# Patient Record
Sex: MALE | Race: WHITE | Employment: OTHER | URBAN - METROPOLITAN AREA
[De-identification: names, ages, dates, MRNs, and addresses within clinical notes are randomized per-mention and may not be internally consistent; named-entity substitution may affect disease eponyms.]

---

## 2017-02-03 ENCOUNTER — OFFICE VISIT (OUTPATIENT)
Dept: HEMATOLOGY | Age: 57
End: 2017-02-03

## 2017-02-03 VITALS
HEIGHT: 71 IN | WEIGHT: 221.2 LBS | BODY MASS INDEX: 30.97 KG/M2 | OXYGEN SATURATION: 97 % | HEART RATE: 92 BPM | TEMPERATURE: 97.8 F | SYSTOLIC BLOOD PRESSURE: 141 MMHG | DIASTOLIC BLOOD PRESSURE: 87 MMHG

## 2017-02-03 DIAGNOSIS — E83.01 WILSON'S DISEASE: Primary | ICD-10-CM

## 2017-02-03 PROBLEM — D86.9 SARCOIDOSIS: Status: ACTIVE | Noted: 2017-02-03

## 2017-02-03 PROBLEM — G51.0 BELL'S PALSY: Status: ACTIVE | Noted: 2017-02-03

## 2017-02-03 RX ORDER — OMEPRAZOLE 40 MG/1
CAPSULE, DELAYED RELEASE ORAL
Refills: 3 | COMMUNITY
Start: 2016-12-17

## 2017-02-03 RX ORDER — BISMUTH SUBSALICYLATE 262 MG
1 TABLET,CHEWABLE ORAL DAILY
COMMUNITY
End: 2017-04-26

## 2017-02-03 RX ORDER — ZINC ACETATE 50 MG/1
50 CAPSULE ORAL 3 TIMES DAILY
COMMUNITY
Start: 2016-12-13 | End: 2021-03-02 | Stop reason: RX

## 2017-02-03 NOTE — MR AVS SNAPSHOT
Visit Information Date & Time Provider Department Dept. Phone Encounter #  
 2/3/2017  4:00 PM Prudencio Orozco MD [facility] [address] [place] [address] [id number] Follow-up Instructions Return in about 2 weeks (around 2/17/2017) for FS with MLS. Your Appointments 2/3/2017  4:00 PM  
New Patient with Prudencio Orozco MD  
[facility] ([address]) Appt Note: Rashawn's Disease [facility] [phone] [facility] [id number]  
[address] [facility] [place] [address] Upcoming Health Maintenance Date Due Hepatitis C Screening 1960 DTaP/Tdap/Td series (1 - Tdap) 8/24/1982 FOBT Q 1 YEAR AGE 50-75 8/24/2011 INFLUENZA AGE 9 TO ADULT 8/1/2016 Allergies as of 2/3/2017  Review Complete On: 2/3/2017 By: Radha Linda LPN No Known Allergies Current Immunizations  Never Reviewed Name Date Hep A and Hep B Vaccine 12/12/2016, 11/7/2016 Influenza Vaccine 10/12/2016 Pneumococcal Conjugate (PCV-13) 12/24/2015 Td 11/23/2015 Not reviewed this visit You Were Diagnosed With   
  
 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 
ICD-9-CM: 275.1 Vitals BP Pulse Temp Height(growth percentile) Weight(growth percentile) SpO2  
 141/87 (BP 1 Location: Right arm, BP Patient Position: Sitting) 92 97.8 °F (36.6 °C) (Oral) 5' 11\" (1.803 m) 221 lb 3.2 oz (100.3 kg) 97% BMI Smoking Status 30.85 kg/m2 Never Assessed Vitals History BMI and BSA Data Body Mass Index Body Surface Area  
 30.85 kg/m 2 2.24 m 2 Preferred Pharmacy [facility] Phone [facility], [address] Your Updated Medication List  
  
   
This list is accurate as of: 2/3/17  3:42 PM.  Always use your most recent med list.  
  
  
  
  
 GALZIN 50 mg (zinc) Cap Generic drug:  zinc acetate  
  
 multivitamin tablet Commonly known as:  ONE A DAY Take 1 Tab by mouth daily. OMEGA 3 FISH OIL PO Take  by mouth. omeprazole 40 mg capsule Commonly known as:  PRILOSEC  
1 (ONE) CAPSULE DR BY MOUTH DAILY We Performed the Following ACTIN (SMOOTH MUSCLE) ANTIBODY [id number] CPT(R)] ANTI-NEUTROPHIL CYTOPLASMIC AB D0779820 CPT(R)] ANTINUCLEAR ANTIBODIES, IFA C2195290 CPT(R)] CBC WITH AUTOMATED DIFF [38040 CPT(R)] CERULOPLASMIN N7157632 CPT(R)] COPPER N558978 CPT(R)] HCV AB W/REFLEX VERIFICATION [[id number] Custom] HEP A AB, TOTAL C5839045 CPT(R)] HEP B SURFACE AB E8064160 CPT(R)] HEP B SURFACE AG D8530418 CPT(R)] HEPATIC FUNCTION PANEL [42451 CPT(R)] HEPATITIS B CORE AB, TOTAL H0462092 CPT(R)] METABOLIC PANEL, BASIC [59784 CPT(R)] MITOCHONDRIAL M2 AB E8390098 CPT(R)] ROHIT DISEASE SCREEN [[id number] Custom] Follow-up Instructions Return in about 2 weeks (around 2/17/2017) for FS with MLS. To-Do List   
 02/16/2017 8:30 AM  
  Appointment with Sheila Ochoa MD at [facility] ([phone]) Introducing [facility] & HEALTH SERVICES! Anitra Gallegos introduces [employer] patient portal. Now you can access parts of your medical record, email your doctor's office, and request medication refills online. 1. In your internet browser, go to [email or web address] 2. Click on the First Time User? Click Here link in the Sign In box. You will see the New Member Sign Up page. 3. Enter your [employer] Access Code exactly as it appears below. You will not need to use this code after youve completed the sign-up process. If you do not sign up before the expiration date, you must request a new code. · [employer] Access Code: [email or web address] Expires: 5/4/2017  3:42 PM 
 
4.  Enter the last four digits of your Social Security Number (xxxx) and Date of Birth (mm/dd/yyyy) as indicated and click Submit. You will be taken to the next sign-up page. 5. Create a Mobile [employer] ID. This will be your Mobile [employer] login ID and cannot be changed, so think of one that is secure and easy to remember. 6. Create a [employer] password. You can change your password at any time. 7. Enter your Password Reset Question and Answer. This can be used at a later time if you forget your password. 8. Enter your e-mail address. You will receive e-mail notification when new information is available in [address] 9. Click Sign Up. You can now view and download portions of your medical record. 10. Click the Download Summary menu link to download a portable copy of your medical information. If you have questions, please visit the Frequently Asked Questions section of the [employer] website. Remember, [employer] is NOT to be used for urgent needs. For medical emergencies, dial 911. Now available from your iPhone and Android! Please provide this summary of care documentation to your next provider. Your primary care clinician is listed as Evelio Rob. If you have any questions after today's visit, please call [phone].

## 2017-02-03 NOTE — PROGRESS NOTES
[address], MD, [facility]     April REJI Baxter PA-C Raymond Calin, MD, [place], MD Delilah Darden NP [facility], NP        [address] [facility], [address] [place]  22.     [phone]     FAX: [address], [address] 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 All medical records sent by the referring physicians were reviewed including imaging studies     The patient is a 55 y.o.  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 He was monitored at [facility]. He was initially treated with Penicillamine. He has been treated with Zinc for the past several years. In 2016 he was found to have enlarged lymph nodes on the chest XR and was found to have Sarcoidosis. He was also noted to have elevated liver enzymes. Serologic evaluation for markers of chronic liver disease were either not performed or available to me. Imaging of the liver was not performed. An assessment of liver fibrosis with biopsy or elastography has not been performed.       The most recent laboratory studies indicate that the liver transaminases are normal, ALP is elevated, tests of hepatic synthetic and metabolic function are normal, and the platelet count is normal.    The patient has no symptoms which could be attributed to the liver disorder. The patient completes all daily activities without any functional limitations. The patient has not experienced fatigue, pain in the right side over the liver,       ALLERGIES  No Known Allergies    MEDICATIONS  Current Outpatient Prescriptions   Medication Sig    omeprazole (PRILOSEC) 40 mg capsule 1 (ONE) CAPSULE DR BY MOUTH DAILY    GALZIN 50 mg (zinc) cap     multivitamin (ONE A DAY) tablet Take 1 Tab by mouth daily.  OMEGA-3 FATTY ACIDS/FISH OIL (OMEGA 3 FISH OIL PO) Take  by mouth. No current facility-administered medications for this visit. SYSTEM REVIEW NOT RELATED TO LIVER DISEASE OR REVIEWED ABOVE:  Constitution systems: Negative for fever, chills, weight gain, weight loss. Eyes: Negative for visual changes. ENT: Negative for sore throat, painful swallowing. Respiratory: Negative for cough, hemoptysis, SOB. Cardiology: Negative for chest pain, palpitations. GI:  Negative for constipation or diarrhea. : Negative for urinary frequency, dysuria, hematuria, nocturia. Skin: Negative for rash. Hematology: Negative for easy bruising, blood clots. Musculo-skelatal: Negative for back pain, muscle pain, weakness. Neurologic: Negative for headaches, dizziness, vertigo, memory problems not related to HE. Psychology: Negative for anxiety, depression. FAMILY HISTORY:  The father  of accident. The mother  of glioblastoma. A brother  of Wilsons diease. Several siblings have Wilsons disease. SOCIAL HISTORY:  The patient is . The patient has no children. The patient has never used tobacco products. The patient consumes alcohol on rare social occasions never in excess. The patient currently works full time  and cow farmer.         PHYSICAL EXAMINATION:  Visit Vitals    /87 (BP 1 Location: Right arm, BP Patient Position: Sitting)    Pulse 92    Temp 97.8 °F (36.6 °C) (Oral)    Ht 5' 11\" (1.803 m)  Wt 221 lb 3.2 oz (100.3 kg)    SpO2 97%    BMI 30.85 kg/m2     General: No acute distress. Eyes: Sclera anicteric. ENT: No oral lesions. Thyroid normal.  Nodes: No adenopathy. Skin: No spider angiomata. No jaundice. No palmar erythema. Respiratory: Lungs clear to auscultation. Cardiovascular: Regular heart rate. No murmurs. No JVD. Abdomen: Soft non-tender. Liver size normal to percussion/palpation. Spleen not palpable. No obvious ascites. Extremities: No edema. No muscle wasting. No gross arthritic changes. Neurologic: Alert and oriented. Cranial nerves grossly intact. No asterixis.     LABORATORY STUDIES:  Liver [facility] Ref Rng & Units 2/3/2017   WBC 3.4 - 10.8 x10E3/uL 10.3   ANC 1.4 - 7.0 x10E3/uL 5.8   HGB 12.6 - 17.7 g/dL 15.1    - 379 x10E3/uL 261   AST 0 - 40 IU/L 35   ALT 0 - 44 IU/L 44   Alk Phos 39 - 117 IU/L 207 (H)   Bili, Total 0.0 - 1.2 mg/dL 0.6   Bili, Direct 0.00 - 0.40 mg/dL 0.17   Albumin 3.5 - 5.5 g/dL 4.4   BUN 6 - 24 mg/dL 17   Creat 0.76 - 1.27 mg/dL 0.90   Na 134 - 144 mmol/L 139   K 3.5 - 5.2 mmol/L 4.3   Cl 96 - 106 mmol/L 100   CO2 18 - 29 mmol/L 25   Glucose 65 - 99 mg/dL 100 (H)     SEROLOGIES:  Serologies Latest Ref Rng & Units 2/3/2017   Hep A Ab, Total Negative Positive (A)   Hep B Surface Ag Negative Negative   Hep B Core Ab, Total Negative Negative   Hep B Surface AB QL  Non Reactive   Hep C Ab 0.0 - 0.9 s/co ratio <0.1   MANJINDER, IFA  Negative   C-ANCA Neg:<1:20 titer <1:20   P-ANCA Neg:<1:20 titer <1:20   ANCA Neg:<1:20 titer <1:20   ASMCA 0 - 19 Units 19   M2 Ab 0.0 - 20.0 Units 14.7   Ceruloplasmin 16.0 - 31.0 mg/dL 4.9 (L)     LIVER HISTOLOGY:  Not available or performed    ENDOSCOPIC PROCEDURES:  Not available or performed    RADIOLOGY:  Not available or performed    OTHER TESTING:  Not available or performed    ASSESSMENT AND PLAN:  Persistent elevation in alkaline phosphate which is likely related to liver involvement with sarcoidosis. Will perform laboratory testing to monitor liver function and degree of liver injury. This included BMP, hepatic panel, CBC with platelet count,     The ALP is not elevated in Wilsons diease. It is typically low. Liver function is normal.  The platelet count is normal.      Serologic testing to help define the cause of the laboratory abnormality were positive for a low ceruloplasmin consistent with the diagnosis of [place] disease     There is no reason to perform liver biopsy at this time. The need to perform an assessment of liver fibrosis was discussed with the patient. The Fibroscan can assess liver fibrosis and determine if a patient has advanced fibrosis or cirrhosis without the need for liver biopsy. The Fibroscan is currently available at [facility]. This will be performed at the next office appointment. If the Fibroscan suggests advanced fibrosis then a liver biopsy should be considered. Will check serum and urine copper. The patient was directed to continue all current medications at the current dosages. There are no contraindications for the patient to take any medications that are necessary for treatment of other medical issues. The patient was counseled regarding alcohol consumption. Vaccination for viral hepatitis A is not needed. The patient has serologic evidence of prior exposure or vaccination with immunity. All of the above issues were discussed with the patient. All questions were answered. The patient expressed a clear understanding of the above. [facility] [place] [address] in 4 weeks for Fibroscan, to review all data and determine the treatment plan.     Irma Slaas MD  [facility] [place] [facility] [address] [place] [address] [address] [place]  22.  [phone]

## 2017-02-04 LAB
ALBUMIN SERPL-MCNC: 4.4 G/DL (ref 3.5–5.5)
ALP SERPL-CCNC: 207 IU/L (ref 39–117)
ALT SERPL-CCNC: 44 IU/L (ref 0–44)
AST SERPL-CCNC: 35 IU/L (ref 0–40)
BASOPHILS # BLD AUTO: 0.1 X10E3/UL (ref 0–0.2)
BASOPHILS NFR BLD AUTO: 1 %
BILIRUB DIRECT SERPL-MCNC: 0.17 MG/DL (ref 0–0.4)
BILIRUB SERPL-MCNC: 0.6 MG/DL (ref 0–1.2)
BUN SERPL-MCNC: 17 MG/DL (ref 6–24)
BUN/CREAT SERPL: 19 (ref 9–20)
CALCIUM SERPL-MCNC: 9.3 MG/DL (ref 8.7–10.2)
CERULOPLASMIN SERPL-MCNC: 4.9 MG/DL (ref 16–31)
CHLORIDE SERPL-SCNC: 100 MMOL/L (ref 96–106)
CO2 SERPL-SCNC: 25 MMOL/L (ref 18–29)
COMMENT, 144067: NORMAL
COPPER SERPL-MCNC: 11 UG/DL (ref 72–166)
CREAT SERPL-MCNC: 0.9 MG/DL (ref 0.76–1.27)
EOSINOPHIL # BLD AUTO: 0.3 X10E3/UL (ref 0–0.4)
EOSINOPHIL NFR BLD AUTO: 2 %
ERYTHROCYTE [DISTWIDTH] IN BLOOD BY AUTOMATED COUNT: 15 % (ref 12.3–15.4)
GLUCOSE SERPL-MCNC: 100 MG/DL (ref 65–99)
HAV AB SER QL IA: POSITIVE
HBV CORE AB SERPL QL IA: NEGATIVE
HBV SURFACE AB SER QL: NON REACTIVE
HBV SURFACE AG SERPL QL IA: NEGATIVE
HCT VFR BLD AUTO: 43.8 % (ref 37.5–51)
HCV AB S/CO SERPL IA: <0.1 S/CO RATIO (ref 0–0.9)
HGB BLD-MCNC: 15.1 G/DL (ref 12.6–17.7)
IMM GRANULOCYTES # BLD: 0 X10E3/UL (ref 0–0.1)
IMM GRANULOCYTES NFR BLD: 0 %
LYMPHOCYTES # BLD AUTO: 3.1 X10E3/UL (ref 0.7–3.1)
LYMPHOCYTES NFR BLD AUTO: 30 %
MCH RBC QN AUTO: 31.2 PG (ref 26.6–33)
MCHC RBC AUTO-ENTMCNC: 34.5 G/DL (ref 31.5–35.7)
MCV RBC AUTO: 91 FL (ref 79–97)
MONOCYTES # BLD AUTO: 1.1 X10E3/UL (ref 0.1–0.9)
MONOCYTES NFR BLD AUTO: 11 %
NEUTROPHILS # BLD AUTO: 5.8 X10E3/UL (ref 1.4–7)
NEUTROPHILS NFR BLD AUTO: 56 %
PLATELET # BLD AUTO: 261 X10E3/UL (ref 150–379)
POTASSIUM SERPL-SCNC: 4.3 MMOL/L (ref 3.5–5.2)
PROT SERPL-MCNC: 8.5 G/DL (ref 6–8.5)
RBC # BLD AUTO: 4.84 X10E6/UL (ref 4.14–5.8)
SODIUM SERPL-SCNC: 139 MMOL/L (ref 134–144)
WBC # BLD AUTO: 10.3 X10E3/UL (ref 3.4–10.8)

## 2017-02-05 LAB
ACTIN IGG SERPL-ACNC: 19 UNITS (ref 0–19)
MITOCHONDRIA M2 IGG SER-ACNC: 14.7 UNITS (ref 0–20)

## 2017-02-06 LAB
ANA TITR SER IF: NEGATIVE {TITER}
C-ANCA TITR SER IF: NORMAL TITER
P-ANCA ATYPICAL TITR SER IF: NORMAL TITER
P-ANCA TITR SER IF: NORMAL TITER

## 2017-02-16 ENCOUNTER — OFFICE VISIT (OUTPATIENT)
Dept: HEMATOLOGY | Age: 57
End: 2017-02-16

## 2017-02-16 VITALS
OXYGEN SATURATION: 99 % | SYSTOLIC BLOOD PRESSURE: 119 MMHG | WEIGHT: 218.25 LBS | HEART RATE: 73 BPM | HEIGHT: 71 IN | DIASTOLIC BLOOD PRESSURE: 72 MMHG | BODY MASS INDEX: 30.56 KG/M2 | TEMPERATURE: 97.4 F

## 2017-02-16 DIAGNOSIS — E83.01 WILSON'S DISEASE: Primary | ICD-10-CM

## 2017-02-16 NOTE — MR AVS SNAPSHOT
Visit Information Date & Time Provider Department Dept. Phone Encounter #  
 2/16/2017  8:30 AM Jose R Garcia MD [facility] [address] [id number] Your Appointments 4/27/2017 11:00 AM  
PROCEDURE with Jose R Garcia MD  
[facility] Appt Note: Liver Biopsy [address] [facility] [phone] [facility] [address]  
[address] [place] [address] 5/11/2017  3:00 PM  
Follow Up with Jose R Garcia MD  
[facility] Appt Note: Follow Up  
 [address] [facility] [phone] [facility] [address]  
[address] [place] [address] Upcoming Health Maintenance Date Due FOBT Q 1 YEAR AGE 50-75 8/24/2011 DTaP/Tdap/Td series (1 - Tdap) 11/24/2015 Allergies as of 2/16/2017  Review Complete On: 2/16/2017 By: Carlee Espinal LPN No Known Allergies Current Immunizations  Never Reviewed Name Date Hep A and Hep B Vaccine 12/12/2016, 11/7/2016 Influenza Vaccine 10/12/2016 Pneumococcal Conjugate (PCV-13) 12/24/2015 Td 11/23/2015 Not reviewed this visit You Were Diagnosed With   
  
 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 
ICD-9-CM: 275.1 Vitals BP Pulse Temp Height(growth percentile) Weight(growth percentile) SpO2  
 119/72 73 97.4 °F (36.3 °C) (Tympanic) 5' 11\" (1.803 m) 218 lb 4 oz (99 kg) 99% BMI Smoking Status 30.44 kg/m2 Never Smoker BMI and BSA Data Body Mass Index Body Surface Area  
 30.44 kg/m 2 2.23 m 2 Preferred Pharmacy [facility] Name Phone [facility] [address], [address] Your Updated Medication List  
  
   
This list is accurate as of: 2/16/17  9:01 AM.  Always use your most recent med list.  
  
  
  
  
 GALZIN 50 mg (zinc) Cap Generic drug:  zinc acetate  
  
 multivitamin tablet Commonly known as:  ONE A DAY Take 1 Tab by mouth daily. OMEGA 3 FISH OIL PO Take  by mouth. omeprazole 40 mg capsule Commonly known as:  PRILOSEC  
1 (ONE) CAPSULE DR BY MOUTH DAILY We Performed the Following CBC WITH AUTOMATED DIFF [59348 CPT(R)] HEPATIC FUNCTION PANEL [47657 CPT(R)] PROTHROMBIN TIME + INR [98234 CPT(R)] To-Do List   
 03/18/2017 Procedures:  BIOPSY LIVER Introducing [facility] & HEALTH SERVICES! Keerthi Randall introduces [employer] patient portal. Now you can access parts of your medical record, email your doctor's office, and request medication refills online. 1. In your internet browser, go to [email or web address] 2. Click on the First Time User? Click Here link in the Sign In box. You will see the New Member Sign Up page. 3. Enter your [employer] Access Code exactly as it appears below. You will not need to use this code after youve completed the sign-up process. If you do not sign up before the expiration date, you must request a new code. · [employer] Access Code: [email or web address] Expires: 5/4/2017  3:42 PM 
 
4. Enter the last four digits of your Social Security Number (xxxx) and Date of Birth (mm/dd/yyyy) as indicated and click Submit. You will be taken to the next sign-up page. 5. Create a [employer] ID. This will be your [employer] login ID and cannot be changed, so think of one that is secure and easy to remember. 6. Create a [employer] password. You can change your password at any time. 7. Enter your Password Reset Question and Answer. This can be used at a later time if you forget your password. 8. Enter your e-mail address. You will receive e-mail notification when new information is available in [address] 9. Click Sign Up. You can now view and download portions of your medical record. 10. Click the Download Summary menu link to download a portable copy of your medical information. If you have questions, please visit the Frequently Asked Questions section of the [employer] website. Remember, [employer] is NOT to be used for urgent needs. For medical emergencies, dial 911. Now available from your iPhone and Android! Please provide this summary of care documentation to your next provider. Your primary care clinician is listed as Robert Saleh. If you have any questions after today's visit, please call [phone].

## 2017-02-16 NOTE — PROGRESS NOTES
93 Dillan Melvin MD, REJI Kelley PA-C Prentiss Callander, MD, [address], MD Delilah Alvarez NP Sam Ogren, NP        9400 [facility], [address] [facility], [address]     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 The active problem list, all pertinent past medical history, medications, radiologic findings and laboratory findings related to the liver disorder were reviewed with the patient. The patient is a 55 y.o.  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 He was monitored at [facility]. He was initially treated with Penicillamine. He has been treated with Zinc for the past several years. In 2016 he was found to have enlarged lymph nodes on the chest XR and was found to have Sarcoidosis. He was also noted to have elevated liver enzymes. Serologic evaluation for markers of chronic liver disease positive for a low ceruloplasmin. Imaging of the liver was not performed. Assessment of liver fibrosis was performed in the office today with Fibroscan. The result was 21.8 kPa which correlates with cirrhosis.     The most recent laboratory studies indicate that the liver transaminases are normal, ALP is elevated, tests of hepatic synthetic and metabolic function are normal, and the platelet count is normal.    The patient has no symptoms which could be attributed to the liver disorder. The patient completes all daily activities without any functional limitations. The patient has not experienced fatigue, pain in the right side over the liver,       ALLERGIES  No Known Allergies    MEDICATIONS  Current Outpatient Prescriptions   Medication Sig    omeprazole (PRILOSEC) 40 mg capsule 1 (ONE) CAPSULE DR BY MOUTH DAILY    GALZIN 50 mg (zinc) cap     multivitamin (ONE A DAY) tablet Take 1 Tab by mouth daily.  OMEGA-3 FATTY ACIDS/FISH OIL (OMEGA 3 FISH OIL PO) Take  by mouth. No current facility-administered medications for this visit. SYSTEM REVIEW NOT RELATED TO LIVER DISEASE OR REVIEWED ABOVE:  Constitution systems: Negative for fever, chills, weight gain, weight loss. Eyes: Negative for visual changes. ENT: Negative for sore throat, painful swallowing. Respiratory: Negative for cough, hemoptysis, SOB. Cardiology: Negative for chest pain, palpitations. GI:  Negative for constipation or diarrhea. : Negative for urinary frequency, dysuria, hematuria, nocturia. Skin: Negative for rash. Hematology: Negative for easy bruising, blood clots. Musculo-skelatal: Negative for back pain, muscle pain, weakness. Neurologic: Negative for headaches, dizziness, vertigo, memory problems not related to HE. Psychology: Negative for anxiety, depression. FAMILY HISTORY:  The father  of accident. The mother  of glioblastoma. A brother  of Wilsons diease. Several siblings have Wilsons disease. SOCIAL HISTORY:  The patient is . The patient has no children. The patient has never used tobacco products. The patient consumes alcohol on rare social occasions never in excess.     The patient currently works full time  and cow farmer. PHYSICAL EXAMINATION:  Visit Vitals    /72    Pulse 73    Temp 97.4 °F (36.3 °C) (Tympanic)    Ht 5' 11\" (1.803 m)    Wt 218 lb 4 oz (99 kg)    SpO2 99%    BMI 30.44 kg/m2     General: No acute distress. Eyes: Sclera anicteric. ENT: No oral lesions. Thyroid normal.  Nodes: No adenopathy. Skin: No spider angiomata. No jaundice. No palmar erythema. Respiratory: Lungs clear to auscultation. Cardiovascular: Regular heart rate. No murmurs. No JVD. Abdomen: Soft non-tender. Liver size normal to percussion/palpation. Spleen not palpable. No obvious ascites. Extremities: No edema. No muscle wasting. No gross arthritic changes. Neurologic: Alert and oriented. Cranial nerves grossly intact. No asterixis. LABORATORY STUDIES:  [facility] Ref Rng & Units 2/3/2017   WBC 3.4 - 10.8 x10E3/uL 10.3   ANC 1.4 - 7.0 x10E3/uL 5.8   HGB 12.6 - 17.7 g/dL 15.1    - 379 x10E3/uL 261   AST 0 - 40 IU/L 35   ALT 0 - 44 IU/L 44   Alk Phos 39 - 117 IU/L 207 (H)   Bili, Total 0.0 - 1.2 mg/dL 0.6   Bili, Direct 0.00 - 0.40 mg/dL 0.17   Albumin 3.5 - 5.5 g/dL 4.4   BUN 6 - 24 mg/dL 17   Creat 0.76 - 1.27 mg/dL 0.90   Na 134 - 144 mmol/L 139   K 3.5 - 5.2 mmol/L 4.3   Cl 96 - 106 mmol/L 100   CO2 18 - 29 mmol/L 25   Glucose 65 - 99 mg/dL 100 (H)     SEROLOGIES:  Serologies Latest Ref Rng & Units 2/3/2017   Hep A Ab, Total Negative Positive (A)   Hep B Surface Ag Negative Negative   Hep B Core Ab, Total Negative Negative   Hep B Surface AB QL  Non Reactive   Hep C Ab 0.0 - 0.9 s/co ratio <0.1   MANJINDER, IFA  Negative   C-ANCA Neg:<1:20 titer <1:20   P-ANCA Neg:<1:20 titer <1:20   ANCA Neg:<1:20 titer <1:20   ASMCA 0 - 19 Units 19   M2 Ab 0.0 - 20.0 Units 14.7   Ceruloplasmin 16.0 - 31.0 mg/dL 4.9 (L)     LIVER HISTOLOGY:  2/2017. FibroScan performed at [address]. EkPa was 21.8. IQR/med 28%. The results suggested a fibrosis level of F4.     ENDOSCOPIC PROCEDURES:  Not available or performed    RADIOLOGY:  Not available or performed    OTHER TESTING:  Not available or performed    ASSESSMENT AND PLAN:  Persistent elevation in alkaline phosphate which is likely related to liver involvement with sarcoidosis. The ALP is not elevated in Wilsons diease. It is typically low. Serum copper is low consistent with Wilsons disease. Liver function is normal.  The platelet count is normal.      Serologic testing to help define the cause of the laboratory abnormality were positive for a low ceruloplasmin consistent with the diagnosis of Rashawn disease     The Fibroscan suggests the patient has cirrhosis. This is not consistent with the clinical picture of lab studies. It is possible that Sarcoidosis has incresaed the stiffness of the liver without causing cirrhosis. It is also possible that the combiantion of hepatic Sarcoidosis and copper has causes cirrhosis. The need to perform an assessment of liver fibrosis was discussed with the patient. The Fibroscan can assess liver fibrosis and determine if a patient has advanced fibrosis or cirrhosis without the need for liver biopsy. The Fibroscan is currently available at [facility]. Will proceed with liver biopsy. The patient was directed to continue all current medications at the current dosages. There are no contraindications for the patient to take any medications that are necessary for treatment of other medical issues. The patient was counseled regarding alcohol consumption. Vaccination for viral hepatitis A is not needed. The patient has serologic evidence of prior exposure or vaccination with immunity. All of the above issues were discussed with the patient. All questions were answered. The patient expressed a clear understanding of the above. [address] in 2 weeks after liver biopsy.     Ag Ortega MD  [facility]. Arelis [address]  [address] [place]  22.  [phone]

## 2017-02-17 LAB
ALBUMIN SERPL-MCNC: 4.2 G/DL (ref 3.5–5.5)
ALP SERPL-CCNC: 178 IU/L (ref 39–117)
ALT SERPL-CCNC: 42 IU/L (ref 0–44)
AST SERPL-CCNC: 39 IU/L (ref 0–40)
BASOPHILS # BLD AUTO: 0.1 X10E3/UL (ref 0–0.2)
BASOPHILS NFR BLD AUTO: 1 %
BILIRUB DIRECT SERPL-MCNC: 0.14 MG/DL (ref 0–0.4)
BILIRUB SERPL-MCNC: 0.5 MG/DL (ref 0–1.2)
EOSINOPHIL # BLD AUTO: 0.2 X10E3/UL (ref 0–0.4)
EOSINOPHIL NFR BLD AUTO: 2 %
ERYTHROCYTE [DISTWIDTH] IN BLOOD BY AUTOMATED COUNT: 15.2 % (ref 12.3–15.4)
HCT VFR BLD AUTO: 43.6 % (ref 37.5–51)
HGB BLD-MCNC: 14.9 G/DL (ref 12.6–17.7)
IMM GRANULOCYTES # BLD: 0 X10E3/UL (ref 0–0.1)
IMM GRANULOCYTES NFR BLD: 0 %
INR PPP: 1.1 (ref 0.8–1.2)
LYMPHOCYTES # BLD AUTO: 2.6 X10E3/UL (ref 0.7–3.1)
LYMPHOCYTES NFR BLD AUTO: 34 %
MCH RBC QN AUTO: 30.5 PG (ref 26.6–33)
MCHC RBC AUTO-ENTMCNC: 34.2 G/DL (ref 31.5–35.7)
MCV RBC AUTO: 89 FL (ref 79–97)
MONOCYTES # BLD AUTO: 0.8 X10E3/UL (ref 0.1–0.9)
MONOCYTES NFR BLD AUTO: 10 %
NEUTROPHILS # BLD AUTO: 4 X10E3/UL (ref 1.4–7)
NEUTROPHILS NFR BLD AUTO: 53 %
PLATELET # BLD AUTO: 299 X10E3/UL (ref 150–379)
PROT SERPL-MCNC: 8 G/DL (ref 6–8.5)
PROTHROMBIN TIME: 11.4 SEC (ref 9.1–12)
RBC # BLD AUTO: 4.88 X10E6/UL (ref 4.14–5.8)
WBC # BLD AUTO: 7.6 X10E3/UL (ref 3.4–10.8)

## 2017-02-28 LAB — ATP7B GENE MUTATIONS FOUND [IDENTIFIER] IN BLOOD OR TISSUE BY MOLECULAR GENETICS METHOD NOMINAL: NORMAL

## 2017-04-27 ENCOUNTER — HOSPITAL ENCOUNTER (OUTPATIENT)
Dept: ULTRASOUND IMAGING | Age: 57
Discharge: HOME | End: 2017-04-27
Attending: INTERNAL MEDICINE
Payer: COMMERCIAL

## 2017-04-27 ENCOUNTER — HOSPITAL ENCOUNTER (OUTPATIENT)
Age: 57
Setting detail: OUTPATIENT SURGERY
Discharge: HOME | End: 2017-04-27
Attending: INTERNAL MEDICINE | Admitting: INTERNAL MEDICINE
Payer: COMMERCIAL

## 2017-04-27 VITALS
DIASTOLIC BLOOD PRESSURE: 68 MMHG | RESPIRATION RATE: 17 BRPM | SYSTOLIC BLOOD PRESSURE: 123 MMHG | HEIGHT: 71 IN | WEIGHT: 217 LBS | OXYGEN SATURATION: 93 % | HEART RATE: 87 BPM | BODY MASS INDEX: 30.38 KG/M2

## 2017-04-27 DIAGNOSIS — E83.01 WILSON'S DISEASE: ICD-10-CM

## 2017-04-27 PROCEDURE — 88313 SPECIAL STAINS GROUP 2: CPT | Performed by: INTERNAL MEDICINE

## 2017-04-27 PROCEDURE — 88307 TISSUE EXAM BY PATHOLOGIST: CPT | Performed by: INTERNAL MEDICINE

## 2017-04-27 PROCEDURE — 88312 SPECIAL STAINS GROUP 1: CPT | Performed by: INTERNAL MEDICINE

## 2017-04-27 PROCEDURE — 77030013826 HC NDL BIOP MAXCOR BARD -B: Performed by: INTERNAL MEDICINE

## 2017-04-27 PROCEDURE — 76040000019: Performed by: INTERNAL MEDICINE

## 2017-04-27 RX ORDER — SODIUM CHLORIDE 9 MG/ML
INJECTION INTRAMUSCULAR; INTRAVENOUS; SUBCUTANEOUS
Status: DISCONTINUED
Start: 2017-04-27 | End: 2017-04-27 | Stop reason: HOSPADM

## 2017-04-27 RX ORDER — ONDANSETRON 2 MG/ML
4 INJECTION INTRAMUSCULAR; INTRAVENOUS
Status: DISCONTINUED | OUTPATIENT
Start: 2017-04-27 | End: 2017-04-27 | Stop reason: HOSPADM

## 2017-04-27 RX ORDER — SODIUM CHLORIDE 0.9 % (FLUSH) 0.9 %
5-10 SYRINGE (ML) INJECTION AS NEEDED
Status: DISCONTINUED | OUTPATIENT
Start: 2017-04-27 | End: 2017-04-27 | Stop reason: HOSPADM

## 2017-04-27 RX ORDER — HYDROMORPHONE HYDROCHLORIDE 1 MG/ML
1 INJECTION, SOLUTION INTRAMUSCULAR; INTRAVENOUS; SUBCUTANEOUS
Status: DISCONTINUED | OUTPATIENT
Start: 2017-04-27 | End: 2017-04-27 | Stop reason: HOSPADM

## 2017-04-27 RX ORDER — SODIUM CHLORIDE 0.9 % (FLUSH) 0.9 %
5-10 SYRINGE (ML) INJECTION EVERY 8 HOURS
Status: DISCONTINUED | OUTPATIENT
Start: 2017-04-27 | End: 2017-04-27 | Stop reason: HOSPADM

## 2017-04-27 NOTE — PROCEDURES
93 Dillan Melvin MD, REJI Stevens HandlerCORAL MD, MD Delilah Marie NP Arden Rattan, NP        0630 [facility], [address] [address].     [phone]     FAX: [address] [place] [address], [address] [place] [address], 300 [facility] - [address]     [phone]     FAX: [phone]         LIVER BIOPSY PROCEDURE NOTE    Manual Davide  1960    INDICATIONS/PRE-OPERATIVE  DIAGNOSIS:  Wilsons disease. Sarcoidosis    : Joan Rolle MD    SEDATION: 1% Lidocaine injection 10 ml    PROCEDURE:  Informed consent to perform the procedure was obtained from the patient. The patient was positioned on the edge of the stretcher lying flat in the supine position. Ultrasound was utilized to image the liver. The diaphragm and any major mass lesion or vascular structures within the liver were identified. An appropriate site for liver biopsy was identified. The distance from the surface of the skin to the liver capsule was 3 cm. This area was prepped with betadine and draped in sterile fashion. The skin was infiltrated with 1% lidocaine. The deeper subcutanous tissues and liver capsule overlying the biopsy site were then infiltrated with 1% lidocaine until appropriate anesthesia was obtained. A small incision was made in the skin so the biopsy devise could be easily inserted. A total of 2 passes with the 18 gauge Bard biopsy devise was then made into the liver. Core(s) of liver tissue totaling 4 cm in length were obtained and placed into tissue fixative. A band aid was placed over the biopsy site. The patient was then repositioned on the right side and transported to the recovery area on the stretcher for routine monitoring until discharge.     The specimen was sent to pathology for processing via the normal transport mechanism. SPECIMEN REMOVED: Liver    ESTIMATED BLOOD LOSS: Negligible.       POST-OPERATIVE DIAGNOSIS: Same as Pre-operative Diagnosis    Flor Antunez MD       4/27/2017  1:11 PM

## 2017-04-27 NOTE — DISCHARGE INSTRUCTIONS
81 Dillan Melvin MD, [address]     April Nithya Steen, CORAL Buck MD, MD Delilah Callaway NP Cathaleen Borer, NP        5832 [facility], [address] [address]  22.     [phone]     FAX: 411 [facility], [address] [place] [address], [address]     [phone]     FAX: [phone]         LIVER BIOPSY DISCHARGE INSTRUCTIONS      Corry Gracia  1960  Date: 4/27/2017    DIET:    Kelby Gee may resume your previous diet. ACTIVITIES:  Rest quietly the rest of today. You should not lift any objects more than 20 pounds for the next 2 days. If you work sitting down without strenuous activity you may return to work tomorrow. If you exert yourself or do heavy lifting at work you should take tomorrow off. Do not drive or operate hazardous machinery for 12 hours. SPECIAL INSTRUCTIONS:  Do not use any aspirin or non-steroidal (Motin, Advil, Naproxen, etc) pain medications for the next 3 days. You may use extra-strength Tylenol (acetaminophen) if you experience pain or discomfort later today. Call the [facility] [address] [place] [address] office if you experience any of the following:  Persistent or severe abdominal pain. Persistent or severe abdominal distention. Fever and chills   Nausea and vomiting. New or unusual symptoms. Follow-up care: You should have a follow up appointment with Dr. Garay to review the results of the liver biopsy results in 2 weeks. If you do not have an appointment please call the office at the number listed above to schedule this. Other instructions: If you have any problems or questions call the [facility] office at the phone number listed above. DISCHARGE SUMMARY from Nurse:     The following personal items collected during your admission are returned to you:   Dental Appliance: Dental Appliances: None  Vision: Visual Aid: Glasses, At bedside, With patient  Hearing Aid:    Jewelry:    Clothing:    Other Valuables:    Valuables sent to safe:

## 2017-04-27 NOTE — H&P
93 Dillan Melvin MD, [address]     April REJI Huitron PA-C Janine Dust, MD, MD Delilah Arndt NP Maurice Rein, NP        3[address] [facility], [address] [address]  22.     [phone]     FAX: [address] [place] [address], [address] [facility], [address]     [phone]     FAX: [phone]         PRE-PROCEDURE NOTE - LIVER BIOPSY    H and P from last office visit reviewed. Allergies reviewed. Out-patient medication list reviewed. 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 Current Outpatient Prescriptions on File Prior to Encounter   Medication Sig Dispense Refill    omeprazole (PRILOSEC) 40 mg capsule 1 (ONE) CAPSULE DR BY MOUTH DAILY  3    GALZIN 50 mg (zinc) cap Take 50 mg by mouth three (3) times daily.  OMEGA-3 FATTY ACIDS/FISH OIL (OMEGA 3 FISH OIL PO) Take 1 Cap by mouth daily. For liver biopsy to assess Abnormal liver enzymes. The risks of the procedure were discussed with the patient. This included bleeding, pain, and puncture of other organs. All questions were answered. The patient wishes to proceed with the procedure. PHYSICAL EXAMINATION:  VS: per nursing note  General: No acute distress. Eyes: Sclera anicteric. ENT: No oral lesions. Thyroid normal.  Nodes: No adenopathy. Skin: No spider angiomata. No jaundice. No palmar erythema. Respiratory: Lungs clear to auscultation. Cardiovascular: Regular heart rate. No murmurs. No JVD. Abdomen: Soft non-tender, liver size normal to percussion/palpation. Spleen not palpable. No obvious ascites. Extremities: No edema.   No muscle wasting. No gross arthritic changes. Neurologic: Alert and oriented. Cranial nerves grossly intact. No asterixis. LABS:  Lab Results   Component Value Date/Time    WBC 7.6 02/16/2017 12:00 PM    HGB 14.9 02/16/2017 12:00 PM    HCT 43.6 02/16/2017 12:00 PM    PLATELET 077 55/66/2118 12:00 PM    MCV 89 02/16/2017 12:00 PM     Lab Results   Component Value Date/Time    INR 1.1 02/16/2017 12:00 PM    Prothrombin time 11.4 02/16/2017 12:00 PM       ASSESSMENT AND PLAN:  Liver biopsy under ultrasound guidance.     Farrel Nageotte, MD  Liver [place] of [address] [place] [address] [address] [place] [address] [facility].  [phone]

## 2017-04-27 NOTE — IP AVS SNAPSHOT
[place] [address] [place] [address] 
[phone] Patient: Nataliia Choudhury MRN: [id number] GBW:[birth year] You are allergic to the following No active allergies Recent Documentation Height Weight BMI Smoking Status 1.803 m 98.4 kg 30.27 kg/m2 Never Smoker Emergency Contacts Name Discharge Info Relation Home Work Mobile Briana Poon DISCHARGE CAREGIVER [3] Spouse [3] [phone] About your hospitalization You were admitted on:  April 27, 2017 You last received care in the:  [facility] ENDOSCOPY You were discharged on:  April 27, 2017 Unit phone number:  [phone] Why you were hospitalized Your primary diagnosis was:  Not on File Providers Seen During Your Hospitalizations Provider Role Specialty Primary office phone Adilia Schwartz MD Attending Provider Hepatology [phone] Your Primary Care Physician (PCP) Primary Care Physician Office Phone Office Fax  
 Rehana Mccarthys [phone] Follow-up Information Follow up With Details Comments Contact Info Armida Bah MD   [address] [place] [address] 
[phone] Your Appointments Thursday May 11, 2017  3:00 PM EDT Follow Up with Adilia Schwartz MD  
[facility] [address] [place] [address] [place] [facility] [address] [place] [address] [phone] [address] [place] [address]  
[phone] Current Discharge Medication List  
  
CONTINUE these medications which have NOT CHANGED Dose & Instructions Dispensing Information Comments Morning Noon Evening Bedtime GALZIN 50 mg (zinc) Cap Generic drug:  zinc acetate Your last dose was: Your next dose is:    
   
   
 Dose:  50 mg Take 50 mg by mouth three (3) times daily. Refills:  0 MULTIVITAMIN PO Your last dose was: Your next dose is: Take  by mouth. Takes one po once daily. Refills:  0 OMEGA 3 FISH OIL PO Your last dose was: Your next dose is:    
   
   
 Dose:  1 Cap Take 1 Cap by mouth daily. Refills:  0  
     
   
   
   
  
 omeprazole 40 mg capsule Commonly known as:  PRILOSEC Your last dose was: Your next dose is:    
   
   
 1 (ONE) CAPSULE DR BY MOUTH DAILY Refills:  3 Discharge Instructions [address] [place] [address] Joan Rolle MD, REJI Vitaler, CORAL Rolle MD, MD Delilah Santiago NP Arden Rattan, NP 3620 [facility], [address] [facility]. 
   [phone] FAX: [address] [place] [address], [address] [place] [address] 
   [phone] FAX: [phone] LIVER BIOPSY DISCHARGE INSTRUCTIONS Manual Davide 
1960 Date: 4/27/2017 DIET:   
You may resume your previous diet. ACTIVITIES: 
Rest quietly the rest of today. You should not lift any objects more than 20 pounds for the next 2 days. If you work sitting down without strenuous activity you may return to work tomorrow. If you exert yourself or do heavy lifting at work you should take tomorrow off. Do not drive or operate hazardous machinery for 12 hours. SPECIAL INSTRUCTIONS: 
Do not use any aspirin or non-steroidal (Motin, Advil, Naproxen, etc) pain medications for the next 3 days. You may use extra-strength Tylenol (acetaminophen) if you experience pain or discomfort later today. Call the [address] office if you experience any of the following: 
Persistent or severe abdominal pain. Persistent or severe abdominal distention. Fever and chills Nausea and vomiting. New or unusual symptoms. Follow-up care: You should have a follow up appointment with Dr. Ivon Armas to review the results of the liver biopsy results in 2 weeks. If you do not have an appointment please call the office at the number listed above to schedule this. Other instructions: If you have any problems or questions call the [address] office at the phone number listed above. DISCHARGE SUMMARY from Nurse: The following personal items collected during your admission are returned to you:  
Dental Appliance: Dental Appliances: None Vision: Visual Aid: Glasses, At bedside, With patient Hearing Aid:   
Jewelry:   
Clothing:   
Other Valuables:   
Valuables sent to safe:   
 
 
Discharge Orders None Introducing [facility]! Dear Dima Davis: 
Thank you for requesting a [employer] account. Our records indicate that you already have an active [employer] account. You can access your account anytime at [email or web address] Did you know that you can access your hospital and ER discharge instructions at any time in [employer]? You can also review all of your test results from your hospital stay or ER visit. Additional Information If you have questions, please visit the Frequently Asked Questions section of the [employer] website at [email or web address] Remember, [employer] is NOT to be used for urgent needs. For medical emergencies, dial 911. Now available from your iPhone and Android! General Information Please provide this summary of care documentation to your next provider. Patient Signature:  ____________________________________________________________ Date:  ____________________________________________________________  
  
Pavan Diaz Provider Signature:  ____________________________________________________________ Date:  ____________________________________________________________

## 2017-04-27 NOTE — ROUTINE PROCESS
Johnny Barry  1960  [id number]    Situation:  Verbal report received from: BRYAN Real  Procedure: Procedure(s):  LIVER BIOPSY    Background:    Preoperative diagnosis: ROHIT'S DISEASE  Postoperative diagnosis: Liver bx    :  Dr. Jeremy Gage  Assistant(s): Endoscopy RN-1: Trevin Aguila RN  Endoscopy RN-2: Leena Castillo RN    Specimens:   ID Type Source Tests Collected by Time Destination   1 : Liver bx - 4cm Preservative   Ivett Gray MD 4/27/2017 1142 Pathology     H. Pylori  no    Assessment:  Intra-procedure medications       Anesthesia gave intra-procedure sedation and medications, see anesthesia flow sheet no    Intravenous fluids: NS  @ KVO     Vital signs stable yes    Abdominal assessment: round and soft yes    Recommendation:  Discharge patient per MD order yes.   Return to floor no  Family or Friend : wife  Permission to share finding with family or friend yes

## 2017-05-11 ENCOUNTER — OFFICE VISIT (OUTPATIENT)
Dept: HEMATOLOGY | Age: 57
End: 2017-05-11

## 2017-05-11 VITALS
HEART RATE: 99 BPM | OXYGEN SATURATION: 98 % | TEMPERATURE: 99 F | SYSTOLIC BLOOD PRESSURE: 144 MMHG | BODY MASS INDEX: 31.24 KG/M2 | WEIGHT: 224 LBS | DIASTOLIC BLOOD PRESSURE: 83 MMHG

## 2017-05-11 DIAGNOSIS — K74.60 HEPATIC CIRRHOSIS, UNSPECIFIED HEPATIC CIRRHOSIS TYPE (HCC): Primary | ICD-10-CM

## 2017-05-11 PROBLEM — K75.81 NASH (NONALCOHOLIC STEATOHEPATITIS): Status: ACTIVE | Noted: 2017-05-11

## 2017-05-11 NOTE — MR AVS SNAPSHOT
Visit Information Date & Time Provider Department Dept. Phone Encounter #  
 5/11/2017  3:00 PM Waleska Rodrigues MD Liver [facility] leslie [place] [phone] [id number] Follow-up Instructions Return in about 3 months (around 8/11/2017) for NP. Upcoming Health Maintenance Date Due FOBT Q 1 YEAR AGE 50-75 8/24/2011 DTaP/Tdap/Td series (1 - Tdap) 11/24/2015 INFLUENZA AGE 9 TO ADULT 8/1/2017 Allergies as of 5/11/2017  Review Complete On: 5/11/2017 By: Aleksey Clements No Known Allergies Current Immunizations  Never Reviewed Name Date Hep A and Hep B Vaccine 12/12/2016, 11/7/2016 Influenza Vaccine 10/12/2016 Pneumococcal Conjugate (PCV-13) 12/24/2015 Td 11/23/2015 Not reviewed this visit You Were Diagnosed With   
  
 Codes Comments Hepatic cirrhosis, unspecified hepatic cirrhosis type [facility]    -  Primary ICD-10-CM: K74.60 ICD-9-CM: 571.5 Vitals BP Pulse Temp Weight(growth percentile) SpO2 BMI  
 144/83 99 99 °F (37.2 °C) (Tympanic) 224 lb (101.6 kg) 98% 31.24 kg/m2 Smoking Status Never Smoker BMI and BSA Data Body Mass Index Body Surface Area  
 31.24 kg/m 2 2.26 m 2 Preferred Pharmacy Pharmacy Name Phone [facility], [address] [place] [address] Your Updated Medication List  
  
   
This list is accurate as of: 5/11/17  3:51 PM.  Always use your most recent med list.  
  
  
  
  
 GALZIN 50 mg (zinc) Cap Generic drug:  zinc acetate Take 50 mg by mouth three (3) times daily. MULTIVITAMIN PO Take  by mouth. Takes one po once daily. OMEGA 3 FISH OIL PO Take 1 Cap by mouth daily. omeprazole 40 mg capsule Commonly known as:  PRILOSEC  
1 (ONE) CAPSULE DR BY MOUTH DAILY Follow-up Instructions Return in about 3 months (around 8/11/2017) for NP. To-Do List   
 05/11/2017 Imaging:  [facility]! Dear Jeni Fraction: 
Thank you for requesting a [employer] account. Our records indicate that you already have an active [employer] account. You can access your account anytime at [email or web address] Did you know that you can access your hospital and ER discharge instructions at any time in [employer]? You can also review all of your test results from your hospital stay or ER visit. Additional Information If you have questions, please visit the Frequently Asked Questions section of the [employer] website at [email or web address] Remember, [employer] is NOT to be used for urgent needs. For medical emergencies, dial 911. Now available from your iPhone and Android! Please provide this summary of care documentation to your next provider. Your primary care clinician is listed as Keyanna Grace. If you have any questions after today's visit, please call [phone].

## 2017-05-11 NOTE — PROGRESS NOTES
Zuleyka Bragg MD, Zelia Novel, NP Alica Eriksson, PA-C Farrel Nageotte, MD, MD Delilah Boles, REJI Figueroa, REJI        [address], [address] [address]  22.     [phone]     FAX: [address] [place] [address], [address] [place] [address], [address]     [phone]     FAX: [phone]         Patient Care Team:  Khoa De Souza MD as PCP - General (Family Practice)  Catherine Izaguirre MD (Gastroenterology)      Problem List  Date Reviewed: 5/11/2017          Codes Class Noted    Cirrhosis of liver without ascites [facility]) ICD-10-CM: K74.60  ICD-9-CM: 571.5  5/11/2017        RIVERA (nonalcoholic steatohepatitis) ICD-10-CM: K75.81  ICD-9-CM: 571.8  5/11/2017        Rashawn's disease ICD-10-CM: E83.01  ICD-9-CM: 275.1  2/3/2017        Sarcoidosis ([facility] ICD-10-CM: D86.9  ICD-9-CM: 827  2/3/2017        Bell's palsy ICD-10-CM: G51.0  ICD-9-CM: 351.0  2/3/2017                Marvel Andres returns to the [address] for management of Wilsons disease, hepatic steatosis and Sarcoidosis. The active problem list, all pertinent past medical history, medications, radiologic findings and laboratory findings related to the liver disorder were reviewed with the patient. The patient is a 55 y.o.  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 He was monitored at [facility]. He was initially treated with Penicillamine. He has been treated with Zinc for the past several years. In 2016 he was found to have enlarged lymph nodes on chest XR and was found to have Sarcoidosis. He was also noted to have elevated liver enzymes. Serologic evaluation for markers of chronic liver disease are positive for a low ceruloplasmin. Imaging of the liver was not performed. Fibroscan was 21.8 kPa which correlates with cirrhosis. The patient underwent a liver biopsy in 2017. The procedure was well tolerated. I have personally reviewed the liver biopsy slides. This demonstrates features of RIVERA, sarcoid granulomas and cirrhosis. The patient has no symptoms which could be attributed to the liver disorder. The patient completes all daily activities without any functional limitations. The patient has not experienced fatigue, pain in the right side over the liver,       ALLERGIES  No Known Allergies    MEDICATIONS  Current Outpatient Prescriptions   Medication Sig    MULTIVITAMIN PO Take  by mouth. Takes one po once daily.  omeprazole (PRILOSEC) 40 mg capsule 1 (ONE) CAPSULE DR BY MOUTH DAILY    GALZIN 50 mg (zinc) cap Take 50 mg by mouth three (3) times daily.  OMEGA-3 FATTY ACIDS/FISH OIL (OMEGA 3 FISH OIL PO) Take 1 Cap by mouth daily. No current facility-administered medications for this visit. SYSTEM REVIEW NOT RELATED TO LIVER DISEASE OR REVIEWED ABOVE:  Constitution systems: Negative for fever, chills, weight gain, weight loss. Eyes: Negative for visual changes. ENT: Negative for sore throat, painful swallowing. Respiratory: Negative for cough, hemoptysis, SOB. Cardiology: Negative for chest pain, palpitations. GI:  Negative for constipation or diarrhea. : Negative for urinary frequency, dysuria, hematuria, nocturia. Skin: Negative for rash. Hematology: Negative for easy bruising, blood clots. Musculo-skelatal: Negative for back pain, muscle pain, weakness. Neurologic: Negative for headaches, dizziness, vertigo, memory problems not related to HE. Psychology: Negative for anxiety, depression. FAMILY HISTORY:  The father  of accident. The mother  of glioblastoma. A brother  of Wilsons diease. Several siblings have Wilsons disease. SOCIAL HISTORY:  The patient is .     The patient has no children. The patient has never used tobacco products. The patient consumes alcohol on rare social occasions never in excess. The patient currently works full time  and cow farmer. PHYSICAL EXAMINATION:  /83  Pulse 99  Temp 99 °F (37.2 °C) (Tympanic)   Wt 224 lb (101.6 kg)  SpO2 98%  BMI 31.24 kg/m2  General: No acute distress. Eyes: Sclera anicteric. ENT: No oral lesions. Thyroid normal.  Nodes: No adenopathy. Skin: No spider angiomata. No jaundice. No palmar erythema. Respiratory: Lungs clear to auscultation. Cardiovascular: Regular heart rate. No murmurs. No JVD. Abdomen: Soft non-tender. Liver size normal to percussion/palpation. Spleen not palpable. No obvious ascites. Extremities: No edema. No muscle wasting. No gross arthritic changes. Neurologic: Alert and oriented. Cranial nerves grossly intact. No asterixis.     LABORATORY STUDIES:  [facility] Ref Rng & Units 2/16/2017 2/3/2017   WBC 3.4 - 10.8 x10E3/uL 7.6 10.3   ANC 1.4 - 7.0 x10E3/uL 4.0 5.8   HGB 12.6 - 17.7 g/dL 14.9 15.1    - 379 x10E3/uL 299 261   INR 0.8 - 1.2 1.1    AST 0 - 40 IU/L 39 35   ALT 0 - 44 IU/L 42 44   Alk Phos 39 - 117 IU/L 178 (H) 207 (H)   Bili, Total 0.0 - 1.2 mg/dL 0.5 0.6   Bili, Direct 0.00 - 0.40 mg/dL 0.14 0.17   Albumin 3.5 - 5.5 g/dL 4.2 4.4   BUN 6 - 24 mg/dL  17   Creat 0.76 - 1.27 mg/dL  0.90   Na 134 - 144 mmol/L  139   K 3.5 - 5.2 mmol/L  4.3   Cl 96 - 106 mmol/L  100   CO2 18 - 29 mmol/L  25   Glucose 65 - 99 mg/dL  100 (H)     SEROLOGIES:  Serologies Latest Ref Rng & Units 2/3/2017   Hep A Ab, Total Negative Positive (A)   Hep B Surface Ag Negative Negative   Hep B Core Ab, Total Negative Negative   Hep B Surface AB QL  Non Reactive   Hep C Ab 0.0 - 0.9 s/co ratio <0.1   MANJINDER, IFA  Negative   C-ANCA Neg:<1:20 titer <1:20   P-ANCA Neg:<1:20 titer <1:20   ANCA Neg:<1:20 titer <1:20   ASMCA 0 - 19 Units 19   M2 Ab 0.0 - 20.0 Units 14.7   Ceruloplasmin 16.0 - 31.0 mg/dL 4.9 (L)     2/2017. Wilsons disease genetic testing. Two variants in ATP7B gene are associated with Wilsons disease. This is consistent with Wilsons disease. LIVER HISTOLOGY:  2/2017. [facility]. EkPa was 21.8. IQR/med 28%. The results suggested a fibrosis level of F4.  4/2017. Slides reviewed by MLS. Sarcoid granulomas, RIVERA. 25-33% macro and micovesicular steatosis. Mild ballooning. Mild inflammation. Cirrhosis. ENDOSCOPIC PROCEDURES:  8/2016. EGD by Dr Mohini Truong. No esophageal varices. Gastritis. RADIOLOGY:  Not available or performed    OTHER TESTING:  Not available or performed    ASSESSMENT AND PLAN:  Cirrhosis secondary to several etiologies which include treated Wilsons dsiease, RIVERA and sarcoidosis. The ALP is elevated secondary to hepatic Sarcoidosis. There is no treatment for hepatic Sarcoidosis. Steroids are ineffective except in reducing fevers, anorexia and tender hepatomegaly in severe inflammatory sarcoidosis which is not the case here. Serum copper and ceruloplasmin are low consistent with Wilsons disease. Wilsons disease genetic testing is consistent with 2 mutaitons both of which are associated with Wilsons disease. The patient has been treated for Wilsons disease with Zinc for many years. Will continue this treatment. It is currently unclear if he is being adequately treated for Wilsons disease with Zinc supplementation. According to the literature he is. Will consider checking a 24 hour urine for copper. Liver function is normal.  The platelet count is normal.      The patient was directed to continue all current medications at the current dosages. There are no contraindications for the patient to take any medications that are necessary for treatment of other medical issues. The patient was counseled regarding diet and exercise to achieve weight loss.   The best diet for patients with fatty liver is one very low in carbohydrates and enriched with protein such as an Flora's program.      The patient was counseled regarding alcohol consumption. Vaccination for viral hepatitis A is not needed. The patient has serologic evidence of prior exposure or vaccination with immunity. All of the above issues were discussed with the patient. All questions were answered. The patient expressed a clear understanding of the above. [address] [address] in 3 months.       Markel Phoenix MD  Liver [place] [address] [address] [address] 22.  [phone]

## 2017-05-17 ENCOUNTER — HOSPITAL ENCOUNTER (OUTPATIENT)
Dept: ULTRASOUND IMAGING | Age: 57
Discharge: HOME | End: 2017-05-17
Attending: INTERNAL MEDICINE
Payer: COMMERCIAL

## 2017-05-17 DIAGNOSIS — K74.60 HEPATIC CIRRHOSIS, UNSPECIFIED HEPATIC CIRRHOSIS TYPE (HCC): ICD-10-CM

## 2017-05-17 PROCEDURE — 76705 ECHO EXAM OF ABDOMEN: CPT

## 2017-08-11 ENCOUNTER — OFFICE VISIT (OUTPATIENT)
Dept: HEMATOLOGY | Age: 57
End: 2017-08-11

## 2017-08-11 VITALS
BODY MASS INDEX: 26.91 KG/M2 | WEIGHT: 192.2 LBS | DIASTOLIC BLOOD PRESSURE: 64 MMHG | HEIGHT: 71 IN | SYSTOLIC BLOOD PRESSURE: 114 MMHG | TEMPERATURE: 97.4 F

## 2017-08-11 DIAGNOSIS — K74.60 CIRRHOSIS OF LIVER WITHOUT ASCITES, UNSPECIFIED HEPATIC CIRRHOSIS TYPE (HCC): Primary | ICD-10-CM

## 2017-08-11 NOTE — PROGRESS NOTES
[address] Ita Smith MD, [place], [facility], [place]       REJI Monsalve, CORAL Keyes, [facility]   REJI Stark NP        at [address], [address] [address].     [phone]     FAX: [phone]    at [address], [address] [place] [address], [address]     [phone]     FAX: [phone]       Patient Care Team:  Hilario Chapa MD as PCP - General (Family Practice)  Jennifer Payne MD (Gastroenterology)      Problem List  Date Reviewed: 5/11/2017          Codes Class Noted    Cirrhosis of liver without ascites 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 The active problem list, all pertinent past medical history, medications, radiologic findings and laboratory findings related to the liver disorder were reviewed with the patient. The patient is a 55 y.o.  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 He was monitored at [facility]. He was initially treated with Penicillamine. He has been treated with Zinc for the past several years. In 2016 he was found to have enlarged lymph nodes on chest XR and was found to have Sarcoidosis. He was also noted to have elevated liver enzymes. Serologic evaluation for markers of chronic liver disease are positive for a low ceruloplasmin.     The most recent imaging of the liver was Ultrasound performed in 2017. Results suggest fatty liver disease. Fibroscan was 21.8 kPa which correlates with cirrhosis. The patient underwent a liver biopsy in 2017. This demonstrates features of RIVERA, sarcoid granulomas and cirrhosis. The patient has no symptoms which could be attributed to the liver disorder. The patient completes all daily activities without any functional limitations. The patient has not experienced fatigue, pain in the right side over the liver,       ALLERGIES  No Known Allergies    MEDICATIONS  Current Outpatient Prescriptions   Medication Sig    MULTIVITAMIN PO Take  by mouth. Takes one po once daily.  omeprazole (PRILOSEC) 40 mg capsule 1 (ONE) CAPSULE DR BY MOUTH DAILY    GALZIN 50 mg (zinc) cap Take 50 mg by mouth three (3) times daily.  OMEGA-3 FATTY ACIDS/FISH OIL (OMEGA 3 FISH OIL PO) Take 1 Cap by mouth daily. No current facility-administered medications for this visit. SYSTEM REVIEW NOT RELATED TO LIVER DISEASE OR REVIEWED ABOVE:  Constitution systems: Negative for fever, chills, weight gain, weight loss. Eyes: Negative for visual changes. ENT: Negative for sore throat, painful swallowing. Respiratory: Negative for cough, hemoptysis, SOB. Cardiology: Negative for chest pain, palpitations. GI:  Negative for constipation or diarrhea. : Negative for urinary frequency, dysuria, hematuria, nocturia. Skin: Negative for rash. Hematology: Negative for easy bruising, blood clots. Musculo-skelatal: Negative for back pain, muscle pain, weakness. Neurologic: Negative for headaches, dizziness, vertigo, memory problems not related to HE. Psychology: Negative for anxiety, depression. FAMILY HISTORY:  The father  of accident. The mother  of glioblastoma. A brother  of Wilsons diease. Several siblings have Wilsons disease. SOCIAL HISTORY:  The patient is . The patient has no children.      The patient has never used tobacco products. The patient consumes alcohol on rare social occasions never in excess. The patient currently works full time  and cow farmer. PHYSICAL EXAMINATION:  /64 (BP 1 Location: Left arm, BP Patient Position: Sitting)  Temp 97.4 °F (36.3 °C) (Tympanic)   Ht 5' 11\" (1.803 m)  Wt 192 lb 3.2 oz (87.2 kg)  BMI 26.81 kg/m2  General: No acute distress. Eyes: Sclera anicteric. ENT: No oral lesions. Thyroid normal.  Nodes: No adenopathy. Skin: No spider angiomata. No jaundice. No palmar erythema. Respiratory: Lungs clear to auscultation. Cardiovascular: Regular heart rate. No murmurs. No JVD. Abdomen: Soft non-tender. Liver size normal to percussion/palpation. Spleen not palpable. No obvious ascites. Extremities: No edema. No muscle wasting. No gross arthritic changes. Neurologic: Alert and oriented. Cranial nerves grossly intact. No asterixis.     LABORATORY STUDIES:  [facility] Ref Rng & Units 2/16/2017 2/3/2017   WBC 3.4 - 10.8 x10E3/uL 7.6 10.3   ANC 1.4 - 7.0 x10E3/uL 4.0 5.8   HGB 12.6 - 17.7 g/dL 14.9 15.1    - 379 x10E3/uL 299 261   INR 0.8 - 1.2 1.1    AST 0 - 40 IU/L 39 35   ALT 0 - 44 IU/L 42 44   Alk Phos 39 - 117 IU/L 178 (H) 207 (H)   Bili, Total 0.0 - 1.2 mg/dL 0.5 0.6   Bili, Direct 0.00 - 0.40 mg/dL 0.14 0.17   Albumin 3.5 - 5.5 g/dL 4.2 4.4   BUN 6 - 24 mg/dL  17   Creat 0.76 - 1.27 mg/dL  0.90   Na 134 - 144 mmol/L  139   K 3.5 - 5.2 mmol/L  4.3   Cl 96 - 106 mmol/L  100   CO2 18 - 29 mmol/L  25   Glucose 65 - 99 mg/dL  100 (H)     SEROLOGIES:  Serologies Latest Ref Rng & Units 2/3/2017   Hep A Ab, Total Negative Positive (A)   Hep B Surface Ag Negative Negative   Hep B Core Ab, Total Negative Negative   Hep B Surface AB QL  Non Reactive   Hep C Ab 0.0 - 0.9 s/co ratio <0.1   MANJINDER, IFA  Negative   C-ANCA Neg:<1:20 titer <1:20   P-ANCA Neg:<1:20 titer <1:20   ANCA Neg:<1:20 titer <1:20   ASMCA 0 - 19 Units 19   M2 Ab 0.0 - 20.0 Units 14.7   Ceruloplasmin 16.0 - 31.0 mg/dL 4.9 (L)     2/2017. Wilsons disease genetic testing. Two variants in ATP7B gene are associated with Wilsons disease. This is consistent with Wilsons disease. LIVER HISTOLOGY:  2/2017. [facility]. EkPa was 21.8. IQR/med 28%. The results suggested a fibrosis level of F4.  4/2017. Slides reviewed by MLS. Sarcoid granulomas, RIVERA. 25-33% macro and micovesicular steatosis. Mild ballooning. Mild inflammation. Cirrhosis. ENDOSCOPIC PROCEDURES:  8/2016. EGD by Dr Fran Richmond. No esophageal varices. Gastritis. RADIOLOGY:  Not available or performed    OTHER TESTING:  Not available or performed    ASSESSMENT AND PLAN:  Cirrhosis secondary to several etiologies which include treated Wilsons dsiease, RIVERA and sarcoidosis. The ALP is elevated secondary to hepatic Sarcoidosis. There is no treatment for hepatic Sarcoidosis. Steroids are ineffective except in reducing fevers, anorexia and tender hepatomegaly in severe inflammatory sarcoidosis which is not the case here. Serum copper and ceruloplasmin are low consistent with Wilsons disease. Wilsons disease genetic testing is consistent with 2 mutaitons both of which are associated with Wilsons disease. The patient has been treated for Wilsons disease with Zinc for many years. Will continue this treatment. Liver function is normal.  The platelet count is normal.      The patient was directed to continue all current medications at the current dosages. There are no contraindications for the patient to take any medications that are necessary for treatment of other medical issues. The patient was counseled regarding diet and exercise to achieve weight loss.   The best diet for patients with fatty liver is one very low in carbohydrates and enriched with protein such as an Flora's program.      The patient was counseled regarding alcohol consumption. Vaccination for viral hepatitis A is not needed. The patient has serologic evidence of prior exposure or vaccination with immunity. All of the above issues were discussed with the patient. All questions were answered. The patient expressed a clear understanding of the above. [facility] 87 in 3 months.       Shay Polk MD  Liver [place] [address] [place] [address] 22.  [phone]

## 2017-08-11 NOTE — MR AVS SNAPSHOT
Visit Information Date & Time Provider Department Dept. Phone Encounter #  
 8/11/2017  2:30 PM Gerson Rojo MD [facility] [id number] Follow-up Instructions Return in about 6 months (around 2/11/2018) for NP. Your Appointments 2/12/2018  1:30 PM  
Follow Up with SIMEON Badillo [facility] Stephanie Hoskins ([facility] Appt Note: 6 month follow up [facility] [place] [phone] [facility] [id number]  
[address] [facility] [place] [address] Upcoming Health Maintenance Date Due Pneumococcal 19-64 Medium Risk (1 of 1 - PPSV23) 8/24/1980 FOBT Q 1 YEAR AGE 50-75 8/24/2011 DTaP/Tdap/Td series (1 - Tdap) 11/24/2015 INFLUENZA AGE 9 TO ADULT 8/1/2017 Allergies as of 8/11/2017  Review Complete On: 8/11/2017 By: Kevin Durham No Known Allergies Current Immunizations  Never Reviewed Name Date Hep A and Hep B Vaccine 12/12/2016, 11/7/2016 Influenza Vaccine 10/12/2016 Pneumococcal Conjugate (PCV-13) 12/24/2015 Td 11/23/2015 Not reviewed this visit You Were Diagnosed With   
  
 Codes Comments Cirrhosis of liver without ascites, unspecified hepatic cirrhosis type ([facility]    -  Primary ICD-10-CM: K74.60 ICD-9-CM: 571.5 Vitals BP Temp Height(growth percentile) 114/64 (BP 1 Location: Left arm, BP Patient Position: Sitting) 97.4 °F (36.3 °C) (Tympanic) 5' 11\" (1.803 m) Weight(growth percentile) BMI Smoking Status 192 lb 3.2 oz (87.2 kg) 26.81 kg/m2 Never Smoker BMI and BSA Data Body Mass Index Body Surface Area  
 26.81 kg/m 2 2.09 m 2 Preferred Pharmacy Pharmacy Name Phone [facility], [address] Your Updated Medication List  
  
   
This list is accurate as of: 8/11/17  4:27 PM.  Always use your most recent med list.  
  
  
  
  
 GALZIN 50 mg (zinc) Cap Generic drug:  zinc acetate Take 50 mg by mouth three (3) times daily. MULTIVITAMIN PO Take  by mouth. Takes one po once daily. OMEGA 3 FISH OIL PO Take 1 Cap by mouth daily. omeprazole 40 mg capsule Commonly known as:  PRILOSEC  
1 (ONE) CAPSULE DR BY MOUTH DAILY We Performed the Following AFP WITH AFP-L3% [[id number] Custom] CBC WITH AUTOMATED DIFF [53664 CPT(R)] HEPATIC FUNCTION PANEL [03849 CPT(R)] METABOLIC PANEL, COMPREHENSIVE [58322 CPT(R)] Follow-up Instructions Return in about 6 months (around 2/11/2018) for NP. To-Do List   
 02/11/2018 Imaging:  [facility] Introducing [facility] SERVICES! Dear Kandyce Goldberg: 
Thank you for requesting a [employer] account. Our records indicate that you already have an active [employer] account. You can access your account anytime at [email or web address] Did you know that you can access your hospital and ER discharge instructions at any time in [employer]? You can also review all of your test results from your hospital stay or ER visit. Additional Information If you have questions, please visit the Frequently Asked Questions section of the [employer] website at [email or web address] Remember, [employer] is NOT to be used for urgent needs. For medical emergencies, dial 911. Now available from your iPhone and Android! Please provide this summary of care documentation to your next provider. Your primary care clinician is listed as [facility]. If you have any questions after today's visit, please call [phone].

## 2017-08-12 LAB
ALBUMIN SERPL-MCNC: 4.5 G/DL (ref 3.5–5.5)
ALBUMIN/GLOB SERPL: 1.4 {RATIO} (ref 1.2–2.2)
ALP SERPL-CCNC: 160 IU/L (ref 39–117)
ALT SERPL-CCNC: 47 IU/L (ref 0–44)
AST SERPL-CCNC: 39 IU/L (ref 0–40)
BASOPHILS # BLD AUTO: 0.1 X10E3/UL (ref 0–0.2)
BASOPHILS NFR BLD AUTO: 1 %
BILIRUB DIRECT SERPL-MCNC: 0.15 MG/DL (ref 0–0.4)
BILIRUB SERPL-MCNC: 0.6 MG/DL (ref 0–1.2)
BUN SERPL-MCNC: 19 MG/DL (ref 6–24)
BUN/CREAT SERPL: 22 (ref 9–20)
CALCIUM SERPL-MCNC: 9.8 MG/DL (ref 8.7–10.2)
CHLORIDE SERPL-SCNC: 100 MMOL/L (ref 96–106)
CO2 SERPL-SCNC: 27 MMOL/L (ref 18–29)
CREAT SERPL-MCNC: 0.88 MG/DL (ref 0.76–1.27)
EOSINOPHIL # BLD AUTO: 0.2 X10E3/UL (ref 0–0.4)
EOSINOPHIL NFR BLD AUTO: 3 %
ERYTHROCYTE [DISTWIDTH] IN BLOOD BY AUTOMATED COUNT: 14.1 % (ref 12.3–15.4)
GLOBULIN SER CALC-MCNC: 3.3 G/DL (ref 1.5–4.5)
GLUCOSE SERPL-MCNC: 83 MG/DL (ref 65–99)
HCT VFR BLD AUTO: 43.8 % (ref 37.5–51)
HGB BLD-MCNC: 14.7 G/DL (ref 12.6–17.7)
IMM GRANULOCYTES # BLD: 0 X10E3/UL (ref 0–0.1)
IMM GRANULOCYTES NFR BLD: 0 %
LYMPHOCYTES # BLD AUTO: 2.3 X10E3/UL (ref 0.7–3.1)
LYMPHOCYTES NFR BLD AUTO: 33 %
MCH RBC QN AUTO: 30.2 PG (ref 26.6–33)
MCHC RBC AUTO-ENTMCNC: 33.6 G/DL (ref 31.5–35.7)
MCV RBC AUTO: 90 FL (ref 79–97)
MONOCYTES # BLD AUTO: 0.8 X10E3/UL (ref 0.1–0.9)
MONOCYTES NFR BLD AUTO: 11 %
NEUTROPHILS # BLD AUTO: 3.6 X10E3/UL (ref 1.4–7)
NEUTROPHILS NFR BLD AUTO: 52 %
PLATELET # BLD AUTO: 239 X10E3/UL (ref 150–379)
POTASSIUM SERPL-SCNC: 4.5 MMOL/L (ref 3.5–5.2)
PROT SERPL-MCNC: 7.8 G/DL (ref 6–8.5)
RBC # BLD AUTO: 4.86 X10E6/UL (ref 4.14–5.8)
SODIUM SERPL-SCNC: 140 MMOL/L (ref 134–144)
WBC # BLD AUTO: 6.9 X10E3/UL (ref 3.4–10.8)

## 2017-08-15 LAB
AFP L3 MFR SERPL: NORMAL % (ref 0–9.9)
AFP SERPL-MCNC: 1.1 NG/ML (ref 0–8)

## 2018-02-12 ENCOUNTER — HOSPITAL ENCOUNTER (OUTPATIENT)
Dept: ULTRASOUND IMAGING | Age: 58
Discharge: HOME | End: 2018-02-12
Attending: INTERNAL MEDICINE
Payer: COMMERCIAL

## 2018-02-12 ENCOUNTER — OFFICE VISIT (OUTPATIENT)
Dept: HEMATOLOGY | Age: 58
End: 2018-02-12

## 2018-02-12 VITALS
OXYGEN SATURATION: 99 % | DIASTOLIC BLOOD PRESSURE: 70 MMHG | BODY MASS INDEX: 25.62 KG/M2 | HEIGHT: 71 IN | TEMPERATURE: 98 F | HEART RATE: 75 BPM | SYSTOLIC BLOOD PRESSURE: 113 MMHG | WEIGHT: 183 LBS

## 2018-02-12 DIAGNOSIS — K74.60 CIRRHOSIS OF LIVER WITHOUT ASCITES, UNSPECIFIED HEPATIC CIRRHOSIS TYPE (HCC): ICD-10-CM

## 2018-02-12 DIAGNOSIS — K74.60 CIRRHOSIS OF LIVER WITHOUT ASCITES, UNSPECIFIED HEPATIC CIRRHOSIS TYPE (HCC): Primary | ICD-10-CM

## 2018-02-12 PROCEDURE — 76705 ECHO EXAM OF ABDOMEN: CPT

## 2018-02-12 RX ORDER — OMEPRAZOLE 40 MG/1
40 CAPSULE, DELAYED RELEASE ORAL DAILY
COMMUNITY
End: 2018-02-12 | Stop reason: SDUPTHER

## 2018-02-12 NOTE — PROGRESS NOTES
[address] MD Luis, [address], [facility], [place]       REJI Toussaint PA-C Carnell Portugal, [facility]   REJI Salinas NP        at [address]     [address] [place], [place] [address]anurag, [address] [facility], [address]     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 The active problem list, all pertinent past medical history, medications, radiologic findings and laboratory findings related to the liver disorder were reviewed with the patient. The patient is a 65 y.o.  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 He was monitored at [facility]. He was initially treated with Penicillamine. He has been treated on maintenance Zinc for the past ~10 years. In 2016 he was found to have enlarged lymph nodes on chest XR and was found to have sarcoidosis, he is without other symptoms of sarcoidosis. He was also noted to have elevated liver enzymes. Serologic evaluation for markers of chronic liver disease are positive for a low ceruloplasmin and low serum copper.     The most recent imaging of the liver was Ultrasound was performed earlier today, 2/2018. Results suggest normal appearing liver. Fibroscan was 21.8 kPa which correlates with cirrhosis. The patient underwent a liver biopsy in 4/2017. This demonstrates features of RIVERA, sarcoid granulomas and cirrhosis. The patient has no symptoms which could be attributed to the liver disorder. The patient completes all daily activities without any functional limitations. The patient has not experienced fatigue, swelling of the LE or abdomen, or memory issues. Of note, the patient has lost ~40# over the course of the past 9-10 months by following low-carbohydrate diet. He has had vague symptoms of pain in the right side over the liver, occasionally sharp in quality for the past ~1 year. He does not relate this to diet, bowel habits, or fever. ALLERGIES  No Known Allergies    MEDICATIONS  Current Outpatient Prescriptions   Medication Sig    omeprazole (PRILOSEC) 40 mg capsule Take 40 mg by mouth daily.  MULTIVITAMIN PO Take  by mouth. Takes one po once daily.  omeprazole (PRILOSEC) 40 mg capsule 1 (ONE) CAPSULE DR BY MOUTH DAILY    GALZIN 50 mg (zinc) cap Take 50 mg by mouth three (3) times daily.  OMEGA-3 FATTY ACIDS/FISH OIL (OMEGA 3 FISH OIL PO) Take 1 Cap by mouth daily. No current facility-administered medications for this visit. SYSTEM REVIEW NOT RELATED TO LIVER DISEASE OR REVIEWED ABOVE:  Constitution systems: Negative for fever, chills, weight gain, weight loss. Eyes: Negative for visual changes. ENT: Negative for sore throat, painful swallowing. Respiratory: Negative for cough, hemoptysis, SOB. Cardiology: Negative for chest pain, palpitations. GI:  Negative for constipation or diarrhea. Occasional RUQ pain. : Negative for urinary frequency, dysuria, hematuria, nocturia. Skin: Negative for rash. Hematology: Negative for easy bruising, blood clots.     Musculo-skeletal: Negative for back pain, muscle pain, weakness. Neurologic: Negative for headaches, dizziness, vertigo, memory problems not related to HE. Psychology: Negative for anxiety, depression. FAMILY HISTORY:  The father  of accident. The mother  of glioblastoma. A brother  of Rashawn diease. Several siblings have Rashawn disease. SOCIAL HISTORY:  The patient is . The patient has no children. The patient has never used tobacco products. The patient consumes alcohol on rare social occasions never in excess. The patient currently works full time  and cow farmer. PHYSICAL EXAMINATION:  /70  Pulse 75  Temp 98 °F (36.7 °C)  Ht 5' 11\" (1.803 m)  Wt 183 lb (83 kg)  SpO2 99%  BMI 25.52 kg/m2  General: No acute distress. Eyes: Sclera anicteric. ENT: No oral lesions. Thyroid normal.  Nodes: No adenopathy. Skin: No spider angiomata. No jaundice. No palmar erythema. Respiratory: Lungs clear to auscultation. Cardiovascular: Regular heart rate. No murmurs. No JVD. Abdomen: Soft non-tender. Liver size normal to percussion/palpation. Spleen not palpable. No obvious ascites. Extremities: No edema. No muscle wasting. No gross arthritic changes. Neurologic: Alert and oriented. Cranial nerves grossly intact. No asterixis.     LABORATORY STUDIES:  From 2017  AST/ALT/ALP/T Bili/ALB: 39/61/163/0.5/4.7  WBC/HB/PLT/INR:7.9/14.7/221  BUN/CREAT:19/0.86    Liver [place] of [address] Units 2017 2017 2/3/2017   WBC 3.4 - 10.8 x10E3/uL 6.9 7.6 10.3   ANC 1.4 - 7.0 x10E3/uL 3.6 4.0 5.8   HGB 12.6 - 17.7 g/dL 14.7 14.9 15.1    - 379 x10E3/uL 239 299 261   INR 0.8 - 1.2  1.1    AST 0 - 40 IU/L 39 39 35   ALT 0 - 44 IU/L 47 (H) 42 44   Alk Phos 39 - 117 IU/L 160 (H) 178 (H) 207 (H)   Bili, Total 0.0 - 1.2 mg/dL 0.6 0.5 0.6   Bili, Direct 0.00 - 0.40 mg/dL 0.15 0.14 0.17   Albumin 3.5 - 5.5 g/dL 4.5 4.2 4.4   BUN 6 - 24 mg/dL 19  17 Creat 0.76 - 1.27 mg/dL 0.88  0.90   Na 134 - 144 mmol/L 140  139   K 3.5 - 5.2 mmol/L 4.5  4.3   Cl 96 - 106 mmol/L 100  100   CO2 18 - 29 mmol/L 27  25   Glucose 65 - 99 mg/dL 83  100 (H)   Additional lab values drawn at today's office visit are pending at the time of documentation. SEROLOGIES:  Serologies Latest Ref Rng & Units 2/3/2017   Hep A Ab, Total Negative Positive (A)   Hep B Surface Ag Negative Negative   Hep B Core Ab, Total Negative Negative   Hep B Surface AB QL  Non Reactive   Hep C Ab 0.0 - 0.9 s/co ratio <0.1   MANJINDER, IFA  Negative   C-ANCA Neg:<1:20 titer <1:20   P-ANCA Neg:<1:20 titer <1:20   ANCA Neg:<1:20 titer <1:20   ASMCA 0 - 19 Units 19   M2 Ab 0.0 - 20.0 Units 14.7   Ceruloplasmin 16.0 - 31.0 mg/dL 4.9 (L)   2/2017. Rashawn disease genetic testing. Two variants in ATP7B gene are associated with Rashawn disease. This is consistent with Rashawn disease. LIVER HISTOLOGY:  2/2017. FibroScan performed at [address]. EkPa was 21.8. IQR/med 28%. The results suggested a fibrosis level of F4.  4/2017. Slides reviewed by MLS. Sarcoid granulomas, RIVERA. 25-33% macro and micovesicular steatosis. Mild ballooning. Mild inflammation. Cirrhosis. ENDOSCOPIC PROCEDURES:  9/2016. EGD by Dr Elise Smith. No esophageal varices. Gastritis. RADIOLOGY:  2/2018. Ultrasound of liver. Normal appearing liver. No liver mass lesions. Immobile gallstone in the neck of the gallbladder. OTHER TESTING:  Not available or performed    ASSESSMENT AND PLAN:  Cirrhosis secondary to several etiologies which include treated Rashawn disease, RIVERA and sarcoidosis. The ALP is elevated secondary to hepatic sarcoidosis. There is no treatment for hepatic sarcoidosis. Steroids are ineffective except in reducing fevers, anorexia and tender hepatomegaly in severe inflammatory sarcoidosis which is not the case here.     Serum copper and ceruloplasmin are low consistent with Arneta Gal disease. Louretta Ards disease genetic testing is consistent with 2 mutations, both of which are associated with Rashawn disease. The patient has been treated for Rashawn disease with Zinc for many years. Will continue this treatment. He had urinary copper level done ~1 year ago, will consider repeat testing on intermittent basis. Liver function is normal.  The platelet count is normal.      The patient was directed to continue all current medications at the current dosages. There are no contraindications for the patient to take any medications that are necessary for treatment of other medical issues. The patient was counseled regarding diet and exercise to achieve weight loss. The best diet for patients with fatty liver is one very low in carbohydrates and enriched with protein such as an Flora's program.  He has had great success with this course and is within 5# of his goal.     Patient understands rationale for monitoring of cirrhosis complications. He will need to have US of the liver every 6 months to rule out [address] I have also recommended that he have screening EGD every 2-3 years, this should be done again in late 2019. RUQ pain and intermittent nausea. Patient is having symptoms which could be related to presence of gallstone as seen on ultrasound of the liver today. I have made referral to general surgery, Dr Angle Riley, for consideration of elective cholecystectomy. The patient was counseled regarding alcohol consumption. Vaccination for viral hepatitis A is not needed. The patient has serologic evidence of prior exposure or vaccination with immunity. All of the above issues were discussed with the patient. All questions were answered. The patient expressed a clear understanding of the above. [facility] 87 in 6 months with repeat US of the liver, alternating with local MD every other 6 months.     Viviana Glover PA-C  Liver [place] of [address] [place] [address] Jamin Hoskins, [address] [address]  22.  [phone]    From 5/11/2018  AST/ALT/ALP/T Bili/ALB: 39/54/148/0.5/4.8  WBC/HB/PLT/INR: 5.6/14.6/216  BUN/CREAT: 19/1.02

## 2018-02-12 NOTE — MR AVS SNAPSHOT
[address] [facility] [phone] [address] 
[phone] Patient: Severo Catalan MRN: [id number] UIO:[id number]/6893 Visit Information Date & Time Provider Department Dept. Phone Encounter #  
 2/12/2018  1:30 PM Adan Hare, [address] [place] [address] [id number] Upcoming Health Maintenance Date Due Pneumococcal 19-64 Medium Risk (1 of 1 - PPSV23) 8/24/1980 FOBT Q 1 YEAR AGE 50-75 8/24/2011 DTaP/Tdap/Td series (1 - Tdap) 11/24/2015 Influenza Age 5 to Adult 8/1/2017 Allergies as of 2/12/2018  Review Complete On: 11/12/2017 By: Adrienne Segura MD  
 No Known Allergies Current Immunizations  Never Reviewed Name Date Hep A and Hep B Vaccine 12/12/2016, 11/7/2016 Influenza Vaccine 10/12/2016 Pneumococcal Conjugate (PCV-13) 12/24/2015 Td 11/23/2015 Not reviewed this visit You Were Diagnosed With   
  
 Codes Comments Cirrhosis of liver without ascites, unspecified hepatic cirrhosis type ([address]    -  Primary ICD-10-CM: K74.60 ICD-9-CM: 571.5 Vitals BP Pulse Temp Height(growth percentile) Weight(growth percentile) SpO2  
 113/70 75 98 °F (36.7 °C) 5' 11\" (1.803 m) 183 lb (83 kg) 99% BMI Smoking Status 25.52 kg/m2 Never Smoker Vitals History BMI and BSA Data Body Mass Index Body Surface Area 25.52 kg/m 2 2.04 m 2 Preferred Pharmacy Pharmacy Name Phone [facility], [address] [facility] Your Updated Medication List  
  
   
This list is accurate as of: 2/12/18  1:39 PM.  Always use your most recent med list.  
  
  
  
  
 GALZIN 50 mg (zinc) Cap Generic drug:  zinc acetate Take 50 mg by mouth three (3) times daily. MULTIVITAMIN PO Take  by mouth. Takes one po once daily. OMEGA 3 FISH OIL PO Take 1 Cap by mouth daily. omeprazole 40 mg capsule Commonly known as:  PRILOSEC  
1 (ONE) CAPSULE DR BY MOUTH DAILY We Performed the Following AFP WITH AFP-L3% [[id number] Custom] CBC W/O DIFF [31792 CPT(R)] HEPATIC FUNCTION PANEL (6) [[id number] Custom] METABOLIC PANEL, BASIC [27183 CPT(R)] PROTHROMBIN TIME + INR [39644 CPT(R)] REFERRAL TO GENERAL SURGERY [REF27 Custom] Comments:  
 Evaluate RUQ pain and history of gallstone for possible elective cholecystectomy. Referral Information Referral ID Referred By Referred To  
  
 [id number] BUTCH DANIEL [facility]   
   [address] [place] [address] [address]  [facility] Phone: [phone] Fax: [phone] Visits Status Start Date End Date 1 New Request 2/12/18 2/12/19 If your referral has a status of pending review or denied, additional information will be sent to support the outcome of this decision. Introducing [facility] & HEALTH SERVICES! Dear Daksha Horowitz: 
Thank you for requesting a [employer] account. Our records indicate that you already have an active My[employer] account. You can access your account anytime at [email or web address] Did you know that you can access your hospital and ER discharge instructions at any time in [employer]? You can also review all of your test results from your hospital stay or ER visit. Additional Information If you have questions, please visit the Frequently Asked Questions section of the MyEdu website at [email or web address] Remember, MyE[employer] is NOT to be used for urgent needs. For medical emergencies, dial 911. Now available from your iPhone and Android! Please provide this summary of care documentation to your next provider. Your primary care clinician is listed as Manny Martinez. If you have any questions after today's visit, please call [phone].

## 2018-02-12 NOTE — PROGRESS NOTES
1. Have you been to the ER, urgent care clinic since your last visit? Hospitalized since your last visit?no      2. Have you seen or consulted any other health care providers outside of the [address] [place] [address] since your last visit? Include any pap smears or colon screening.  no

## 2018-02-13 LAB
AFP L3 MFR SERPL: NORMAL % (ref 0–9.9)
AFP SERPL-MCNC: 1.2 NG/ML (ref 0–8)
ALBUMIN SERPL-MCNC: 4.5 G/DL (ref 3.5–5.5)
ALP SERPL-CCNC: 147 IU/L (ref 39–117)
ALT SERPL-CCNC: 65 IU/L (ref 0–44)
AST SERPL-CCNC: 40 IU/L (ref 0–40)
BILIRUB DIRECT SERPL-MCNC: 0.16 MG/DL (ref 0–0.4)
BILIRUB SERPL-MCNC: 0.5 MG/DL (ref 0–1.2)
BUN SERPL-MCNC: 18 MG/DL (ref 6–24)
BUN/CREAT SERPL: 19 (ref 9–20)
CALCIUM SERPL-MCNC: 9.6 MG/DL (ref 8.7–10.2)
CHLORIDE SERPL-SCNC: 97 MMOL/L (ref 96–106)
CO2 SERPL-SCNC: 25 MMOL/L (ref 18–29)
CREAT SERPL-MCNC: 0.95 MG/DL (ref 0.76–1.27)
ERYTHROCYTE [DISTWIDTH] IN BLOOD BY AUTOMATED COUNT: 14.1 % (ref 12.3–15.4)
GFR SERPLBLD CREATININE-BSD FMLA CKD-EPI: 103 ML/MIN/1.73
GFR SERPLBLD CREATININE-BSD FMLA CKD-EPI: 89 ML/MIN/1.73
GLUCOSE SERPL-MCNC: 81 MG/DL (ref 65–99)
HCT VFR BLD AUTO: 42.5 % (ref 37.5–51)
HGB BLD-MCNC: 14.6 G/DL (ref 13–17.7)
INR PPP: 1.1 (ref 0.8–1.2)
MCH RBC QN AUTO: 31.1 PG (ref 26.6–33)
MCHC RBC AUTO-ENTMCNC: 34.4 G/DL (ref 31.5–35.7)
MCV RBC AUTO: 91 FL (ref 79–97)
PLATELET # BLD AUTO: 228 X10E3/UL (ref 150–379)
POTASSIUM SERPL-SCNC: 4.6 MMOL/L (ref 3.5–5.2)
PROTHROMBIN TIME: 11.9 SEC (ref 9.1–12)
RBC # BLD AUTO: 4.69 X10E6/UL (ref 4.14–5.8)
SODIUM SERPL-SCNC: 140 MMOL/L (ref 134–144)
WBC # BLD AUTO: 6.8 X10E3/UL (ref 3.4–10.8)

## 2018-02-19 ENCOUNTER — OFFICE VISIT (OUTPATIENT)
Dept: SURGERY | Age: 58
End: 2018-02-19

## 2018-02-19 VITALS
TEMPERATURE: 98 F | WEIGHT: 183 LBS | OXYGEN SATURATION: 98 % | DIASTOLIC BLOOD PRESSURE: 70 MMHG | SYSTOLIC BLOOD PRESSURE: 130 MMHG | RESPIRATION RATE: 16 BRPM | HEIGHT: 71 IN | BODY MASS INDEX: 25.62 KG/M2 | HEART RATE: 83 BPM

## 2018-02-19 DIAGNOSIS — K80.20 CALCULUS OF GALLBLADDER WITHOUT CHOLECYSTITIS WITHOUT OBSTRUCTION: Primary | ICD-10-CM

## 2018-02-19 NOTE — PROGRESS NOTES
1. Have you been to the ER, urgent care clinic since your last visit? Hospitalized since your last visit? No    2. Have you seen or consulted any other health care providers outside of the [address] [place] [address] since your last visit? Include any pap smears or colon screening.  No

## 2018-02-19 NOTE — MR AVS SNAPSHOT
[address] [facility] [address] [address] 
[phone] Patient: Alida Caballero MRN: [id number] QTR:4[birth year]18/[birth year]77 Visit Information Date & Time Provider Department Dept. Phone Encounter #  
 2/19/2018  3:40 PM Terry Keller, [address] [facility] [address] [phone] [id number] Your Appointments 8/13/2018  1:00 PM  
Follow Up with SIMEON Morales 75 ([address]) Appt Note: Follow up [facility] [phone] [facility] [address]  
[address] [facility] [address] Upcoming Health Maintenance Date Due Pneumococcal 19-64 Medium Risk (1 of 1 - PPSV23) 8/24/1980 FOBT Q 1 YEAR AGE 50-75 8/24/2011 DTaP/Tdap/Td series (1 - Tdap) 11/24/2015 Influenza Age 5 to Adult 8/1/2017 Allergies as of 2/19/2018  Review Complete On: 2/19/2018 By: Abimbola Lockwood LPN No Known Allergies Current Immunizations  Never Reviewed Name Date Hep A and Hep B Vaccine 12/12/2016, 11/7/2016 Influenza Vaccine 10/12/2016 Pneumococcal Conjugate (PCV-13) 12/24/2015 Td 11/23/2015 Not reviewed this visit You Were Diagnosed With   
  
 Codes Comments Calculus of gallbladder without cholecystitis without obstruction    -  Primary ICD-10-CM: K80.20 ICD-9-CM: 574.20 Vitals BP Pulse Temp Resp Height(growth percentile) Weight(growth percentile) 130/70 (BP 1 Location: Right arm, BP Patient Position: Sitting) 83 98 °F (36.7 °C) (Oral) 16 5' 11\" (1.803 m) 183 lb (83 kg) SpO2 BMI Smoking Status 98% 25.52 kg/m2 Never Smoker BMI and BSA Data Body Mass Index Body Surface Area 25.52 kg/m 2 2.04 m 2 Preferred Pharmacy Pharmacy Name Phone [facility], [address] Your Updated Medication List  
  
   
 This list is accurate as of: 2/19/18 11:59 PM.  Always use your most recent med list.  
  
  
  
  
 GALZIN 50 mg (zinc) Cap Generic drug:  zinc acetate Take 50 mg by mouth three (3) times daily. MULTIVITAMIN PO Take  by mouth. Takes one po once daily. OMEGA 3 FISH OIL PO Take 1 Cap by mouth daily. omeprazole 40 mg capsule Commonly known as:  PRILOSEC  
1 (ONE) CAPSULE DR BY MOUTH DAILY To-Do List   
 08/13/2018 11:00 AM  
  Appointment with [facility] 1 at [facility] ([phone]) General  NPO DIET RESTICTIONS Please be NPO (nothing by mouth) for 6- 8 hours prior to procedure. GENERAL INSTRUCTIONS 1. Bring any non [facility] facility films/reports pertaining to the area being studied with you on the day of appointment. 2. A written order with a valid diagnosis and Physicians signature is required for all scheduled tests. 3. Check in at registration 30 minutes before your appointment time unless you were instructed to do otherwise. Introducing [facility] & HEALTH SERVICES! Dear Quintin Daniel: 
Thank you for requesting a [employer] account. Our records indicate that you already have an active [employer] account. You can access your account anytime at [email or web address] Did you know that you can access your hospital and ER discharge instructions at any time in [employer]? You can also review all of your test results from your hospital stay or ER visit. Additional Information If you have questions, please visit the Frequently Asked Questions section of the [employer] website at [email or web address] Remember, [employer] is NOT to be used for urgent needs. For medical emergencies, dial 911. Now available from your iPhone and Android! Please provide this summary of care documentation to your next provider.  
  
  
 Your primary care clinician is listed as Isaias Greene. If you have any questions after today's visit, please call [phone].

## 2018-02-20 PROBLEM — K80.20 CHOLELITHIASIS: Status: ACTIVE | Noted: 2018-02-20

## 2018-02-20 NOTE — PROGRESS NOTES
Surgery Consult    Subjective:      Ger Moreno is a 65 y.o.  male who was referred for evaluation of gallstones. His history is very complex, having Rashawn's disease treated with Zinc, RIVERA treated with a low carb diet. He has had pain in his right side off and on for several months. The pain is not related to meals, indeed, is often caused by exertion. The pain is overlying the 9th or 10th rib laterally, and is always very tender to palpation. He does have a little nausea now as well, but not related to meals oor bowel function. 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    Sarcoidosis 02/03/2017    Bell's palsy 02/03/2017     Past Medical History:   Diagnosis Date    Acid reflux disease     Bell's palsy     Cholelithiasis 2/20/2018    Gastritis     GERD (gastroesophageal reflux disease)     H/O chest x-ray     H/O CT scan of abdomen 12/16/2016    H/O CT scan of chest     History of MRI of brain and brain stem 10/27/2016    Sarcoidosis     Rashawn disease     Rashawn's disease       Past Surgical History:   Procedure Laterality Date    HX COLONOSCOPY      HX ENDOSCOPY  09/06/2016      Social History   Substance Use Topics    Smoking status: Never Smoker    Smokeless tobacco: Never Used    Alcohol use No      Family History   Problem Relation Age of Onset    Cancer Mother      brain      Current Outpatient Prescriptions   Medication Sig    MULTIVITAMIN PO Take  by mouth. Takes one po once daily.  omeprazole (PRILOSEC) 40 mg capsule 1 (ONE) CAPSULE DR BY MOUTH DAILY    GALZIN 50 mg (zinc) cap Take 50 mg by mouth three (3) times daily.  OMEGA-3 FATTY ACIDS/FISH OIL (OMEGA 3 FISH OIL PO) Take 1 Cap by mouth daily. No current facility-administered medications for this visit.        No Known Allergies     Review of Systems:    A comprehensive review of systems was negative except for that written in the History of Present Illness. Objective:     @IPVITALS[8:@    [id number]    Physical Exam:  GENERAL: alert, cooperative, no distress, appears stated age, ABDOMEN: soft, non-tender. Bowel sounds normal. No masses,  no organomegaly, no abdominal pain to palpation. In the mid axillary line the 9th rib is very tender and reproduces his pain. Labs: No results found for this or any previous visit (from the past 24 hour(s)). Data Review:  Radiology review: [address] shows large stone in gallbladder with no otherabnormalities. Assessment:     I really believes this stoen is asymptomatic. Most of his pain is in the chest wall, possibly chondritis, or from his percutaneous liver biopsy    Plan:     I would darcy his gallbladder alone. He knows what to look out for.     Signed By: Kristina Elise MD     February 20, 2018

## 2018-08-13 ENCOUNTER — HOSPITAL ENCOUNTER (OUTPATIENT)
Dept: ULTRASOUND IMAGING | Age: 58
Discharge: HOME | End: 2018-08-13
Attending: PHYSICIAN ASSISTANT
Payer: COMMERCIAL

## 2018-08-13 ENCOUNTER — OFFICE VISIT (OUTPATIENT)
Dept: HEMATOLOGY | Age: 58
End: 2018-08-13

## 2018-08-13 VITALS
DIASTOLIC BLOOD PRESSURE: 68 MMHG | HEART RATE: 71 BPM | OXYGEN SATURATION: 100 % | SYSTOLIC BLOOD PRESSURE: 96 MMHG | TEMPERATURE: 97.6 F | BODY MASS INDEX: 24.27 KG/M2 | WEIGHT: 174 LBS

## 2018-08-13 DIAGNOSIS — K74.60 CIRRHOSIS OF LIVER WITHOUT ASCITES, UNSPECIFIED HEPATIC CIRRHOSIS TYPE (HCC): ICD-10-CM

## 2018-08-13 DIAGNOSIS — K74.60 CIRRHOSIS OF LIVER WITHOUT ASCITES, UNSPECIFIED HEPATIC CIRRHOSIS TYPE (HCC): Primary | ICD-10-CM

## 2018-08-13 DIAGNOSIS — E83.01 WILSON'S DISEASE: ICD-10-CM

## 2018-08-13 PROCEDURE — 76700 US EXAM ABDOM COMPLETE: CPT

## 2018-08-13 NOTE — MR AVS SNAPSHOT
[facility] [phone] UlCornelia Meng 25 
[phone] Patient: Cherelle Cheung MRN: [id number]:[id number] Visit Information Date & Time Provider Department Dept. Phone Encounter #  
 8/13/2018  1:00 PM Carrie Silverman, [address] of [place] [address] [id number] Follow-up Instructions Return in about 6 months (around 2/13/2019) for Shantanu Ch . Upcoming Health Maintenance Date Due Pneumococcal 19-64 Medium Risk (1 of 1 - PPSV23) 8/24/1980 FOBT Q 1 YEAR AGE 50-75 8/24/2011 DTaP/Tdap/Td series (1 - Tdap) 11/24/2015 Influenza Age 5 to Adult 8/1/2018 Allergies as of 8/13/2018  Review Complete On: 8/13/2018 By: [place] Journey No Known Allergies Current Immunizations  Never Reviewed Name Date Hep A and Hep B Vaccine 12/12/2016, 11/7/2016 Influenza Vaccine 10/12/2016 Pneumococcal Conjugate (PCV-13) 12/24/2015 Td 11/23/2015 Not reviewed this visit You Were Diagnosed With   
  
 Codes Comments Cirrhosis of liver without ascites, unspecified hepatic cirrhosis type ([facility]    -  Primary ICD-10-CM: K74.60 ICD-9-CM: 571.5 Rashawn's disease     ICD-10-CM: E83.01 
ICD-9-CM: 275.1 Vitals BP Pulse Temp Weight(growth percentile) SpO2 BMI  
 96/68 (BP 1 Location: Left arm, BP Patient Position: Sitting) 71 97.6 °F (36.4 °C) (Tympanic) 174 lb (78.9 kg) 100% 24.27 kg/m2 Smoking Status Never Smoker BMI and BSA Data Body Mass Index Body Surface Area  
 24.27 kg/m 2 1.99 m 2 Preferred Pharmacy Pharmacy Name Phone [facility] [id number] [facility], [address] Your Updated Medication List  
  
   
This list is accurate as of 8/13/18  1:04 PM.  Always use your most recent med list.  
  
  
  
  
 GALZIN 50 mg (zinc) Cap Generic drug:  zinc acetate Take 50 mg by mouth three (3) times daily. MULTIVITAMIN PO Take  by mouth. Takes one po once daily. OMEGA 3 FISH OIL PO Take 1 Cap by mouth daily. omeprazole 40 mg capsule Commonly known as:  PRILOSEC  
1 (ONE) CAPSULE DR BY MOUTH DAILY We Performed the Following AFP WITH AFP-L3% [[id number] Custom] CBC W/O DIFF [44912 CPT(R)] COPPER, UR [id number] CPT(R)] COPPER [id number] CPT(R)] HEPATIC FUNCTION PANEL (6) [[id number] Custom] LIPID PANEL [04093 CPT(R)] METABOLIC PANEL, BASIC [68698 CPT(R)] PROTHROMBIN TIME + INR [62686 CPT(R)] Follow-up Instructions Return in about 6 months (around 2/13/2019) for Maurilio Izaguirre . To-Do List   
 02/13/2019 Imaging:  US ABD COMP Introducing [facility]! Dear Raghu Tanner: 
Thank you for requesting a [employer] account. Our records indicate that you already have an active [employer] account. You can access your account anytime at [email or web address] Did you know that you can access your hospital and ER discharge instructions at any time in [employer]? You can also review all of your test results from your hospital stay or ER visit. Additional Information If you have questions, please visit the Frequently Asked Questions section of the [employer] website at [email or web address] Remember, [employer] is NOT to be used for urgent needs. For medical emergencies, dial 911. Now available from your iPhone and Android! Please provide this summary of care documentation to your next provider. Your primary care clinician is listed as Mallorie Fernando. If you have any questions after today's visit, please call [phone].

## 2018-08-13 NOTE — PROGRESS NOTES
[address] Ita Smith MD, [address], [facility] [place], [place]       Joel Staley, CORAL Meehan, [facility]   REJI Dempsey NP        at [address], [address] [address]     [phone]     FAX: [phone]    at [facility], [address] [place] [address], [address]     [phone]     FAX: [phone]       Patient Care Team:  Reny Alexander as PCP - Yasmeen Cowart MD (Gastroenterology)      Problem List  Date Reviewed: 2/20/2018          Codes Class Noted    Cholelithiasis ICD-10-CM: K80.20  ICD-9-CM: 574.20  2/20/2018        Cirrhosis of liver without ascites ([facility] ICD-10-CM: K74.60  ICD-9-CM: 571.5  5/11/2017        RIVERA (nonalcoholic steatohepatitis) ICD-10-CM: K75.81  ICD-9-CM: 571.8  5/11/2017        Rashawn's disease ICD-10-CM: E83.01  ICD-9-CM: 275.1  2/3/2017        Sarcoidosis ICD-10-CM: D86.9  ICD-9-CM: 353  2/3/2017        Bell's palsy ICD-10-CM: G51.0  ICD-9-CM: 351.0  2/3/2017              Umu Turner returns to the [address] for management of Rashawn disease, hepatic steatosis and sarcoidosis. The active problem list, all pertinent past medical history, medications, radiologic findings and laboratory findings related to the liver disorder were reviewed with the patient. The patient is a 65 y.o.  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 He was monitored at [facility]. He was initially treated with Penicillamine. He has been treated on maintenance Zinc for the past ~10 years. He has continued to tolerate this medication well and has no new complaints at this time. His last 24 hour urine was several years ago.      In 2016 he was found to have enlarged lymph nodes on chest XR and was found to have sarcoidosis, he is without other symptoms of sarcoidosis. He has continued to follow with pulmonary and has annual CT of the chest done, last 6/2018. He has had no development of respiratory distress issues. He was also noted at that time to have elevated liver enzymes. Serologic evaluation for markers of chronic liver disease are positive for a low ceruloplasmin and low serum copper. The most recent imaging of the liver was Ultrasound was performed earlier today, 8/2018. Results suggest normal appearing liver without mass/lesion. Fibroscan was 21.8 kPa which correlates with cirrhosis. The patient underwent a liver biopsy in 4/2017. This demonstrates features of RIVERA, sarcoid granulomas and cirrhosis. The patient has no symptoms which could be attributed to the liver disorder. The patient completes all daily activities without any functional limitations. The patient has not experienced fatigue, swelling of the LE or abdomen, or memory issues. Of note, the patient has lost ~50# over the course of the past 12-18 months by following low-carbohydrate diet and increasing activity with walking >10,000 steps daily. He has had vague symptoms of pain in the right side over the liver, occasionally sharp in quality for the past ~1 year. He does not relate this to diet, bowel habits, or fever. We had sent him for evaluation with Dr Klaus Braden for consideration of gallstone removal.  It was thought that these symptoms were more likely costochondral in nature and no surgery was performed. He is doing well without new symptoms. ALLERGIES  No Known Allergies    MEDICATIONS  Current Outpatient Prescriptions   Medication Sig    MULTIVITAMIN PO Take  by mouth. Takes one po once daily.  omeprazole (PRILOSEC) 40 mg capsule 1 (ONE) CAPSULE DR BY MOUTH DAILY    GALZIN 50 mg (zinc) cap Take 50 mg by mouth three (3) times daily.  OMEGA-3 FATTY ACIDS/FISH OIL (OMEGA 3 FISH OIL PO) Take 1 Cap by mouth daily.      No current facility-administered medications for this visit. SYSTEM REVIEW NOT RELATED TO LIVER DISEASE OR REVIEWED ABOVE:  Constitution systems: Negative for fever, chills, weight gain. Continued weight loss ~9 # since last office visit. Patient is at about his target weight. Eyes: Negative for visual changes. ENT: Negative for sore throat, painful swallowing. Respiratory: Negative for cough, hemoptysis, SOB. Cardiology: Negative for chest pain, palpitations. GI:  Negative for constipation or diarrhea. Occasional RUQ pain. : Negative for urinary frequency, dysuria, hematuria, nocturia. Skin: Negative for rash. Hematology: Negative for easy bruising, blood clots. Musculo-skeletal: Negative for back pain, muscle pain, weakness. Neurologic: Negative for headaches, dizziness, vertigo, memory problems not related to HE. Psychology: Negative for anxiety, depression. FAMILY HISTORY:  The father  of accident. The mother  of glioblastoma. A brother  of Rashawn diease. Several siblings have Rashawn disease. SOCIAL HISTORY:  The patient is . The patient has no children. The patient has never used tobacco products. The patient consumes alcohol on rare social occasions never in excess. The patient currently works full time  and cow farmer. PHYSICAL EXAMINATION:  BP 96/68 (BP 1 Location: Left arm, BP Patient Position: Sitting)  Pulse 71  Temp 97.6 °F (36.4 °C) (Tympanic)   Wt 174 lb (78.9 kg)  SpO2 100%  BMI 24.27 kg/m2  General: No acute distress. Eyes: Sclera anicteric. ENT: No oral lesions. Thyroid normal.  Nodes: No adenopathy. Skin: No spider angiomata. No jaundice. No palmar erythema. Respiratory: Lungs clear to auscultation. Cardiovascular: Regular heart rate. No murmurs. No JVD. Abdomen: Soft non-tender. Liver size normal to percussion/palpation. Spleen not palpable. No obvious ascites. Extremities: No edema. No muscle wasting.   No gross arthritic changes. Neurologic: Alert and oriented. Cranial nerves grossly intact. No asterixis. LABORATORY STUDIES:    From 5/11/2018  AST/ALT/ALP/T Bili/ALB: 39/54/148/0.5/4.8  WBC/HB/PLT/INR: 5.6/14.6/216  BUN/CREAT: 19/1.02    From 11/2017  AST/ALT/ALP/T Bili/ALB: 39/61/163/0.5/4.7  WBC/HB/PLT/INR:7.9/14.7/221  BUN/CREAT:19/0.86    [facility] [place] of [address] [place] [address] Units 8/11/2017 2/16/2017 2/3/2017   WBC 3.4 - 10.8 x10E3/uL 6.9 7.6 10.3   ANC 1.4 - 7.0 x10E3/uL 3.6 4.0 5.8   HGB 12.6 - 17.7 g/dL 14.7 14.9 15.1    - 379 x10E3/uL 239 299 261   INR 0.8 - 1.2  1.1    AST 0 - 40 IU/L 39 39 35   ALT 0 - 44 IU/L 47 (H) 42 44   Alk Phos 39 - 117 IU/L 160 (H) 178 (H) 207 (H)   Bili, Total 0.0 - 1.2 mg/dL 0.6 0.5 0.6   Bili, Direct 0.00 - 0.40 mg/dL 0.15 0.14 0.17   Albumin 3.5 - 5.5 g/dL 4.5 4.2 4.4   BUN 6 - 24 mg/dL 19  17   Creat 0.76 - 1.27 mg/dL 0.88  0.90   Na 134 - 144 mmol/L 140  139   K 3.5 - 5.2 mmol/L 4.5  4.3   Cl 96 - 106 mmol/L 100  100   CO2 18 - 29 mmol/L 27  25   Glucose 65 - 99 mg/dL 83  100 (H)     Cancer Screening Latest Ref Rng & Units 2/12/2018 8/11/2017   AFP, Serum 0.0 - 8.0 ng/mL 1.2 1.1   AFP-L3% 0.0 - 9.9 % Comment Comment   Additional lab values drawn at today's office visit are pending at the time of documentation. SEROLOGIES:  Serologies Latest Ref Rng & Units 2/3/2017   Hep A Ab, Total Negative Positive (A)   Hep B Surface Ag Negative Negative   Hep B Core Ab, Total Negative Negative   Hep B Surface AB QL  Non Reactive   Hep C Ab 0.0 - 0.9 s/co ratio <0.1   MANJINDER, IFA  Negative   C-ANCA Neg:<1:20 titer <1:20   P-ANCA Neg:<1:20 titer <1:20   ANCA Neg:<1:20 titer <1:20   ASMCA 0 - 19 Units 19   M2 Ab 0.0 - 20.0 Units 14.7   Ceruloplasmin 16.0 - 31.0 mg/dL 4.9 (L)   2/2017. Rashawn disease genetic testing. Two variants in ATP7B gene are associated with Rashawn disease. This is consistent with Rashawn disease. LIVER HISTOLOGY:  2/2017.   FibroScan performed at [facility] [place] [facility] [place]. EkPa was 21.8. IQR/med 28%. The results suggested a fibrosis level of F4.  4/2017. Slides reviewed by MLS. Sarcoid granulomas, RIVERA. 25-33% macro and micovesicular steatosis. Mild ballooning. Mild inflammation. Cirrhosis. ENDOSCOPIC PROCEDURES:  9/2016. EGD by Dr Anirudh Ramey. No esophageal varices. Gastritis. 6/2018. Colonoscopy by Dr Anirudh Ramey. Internal hemorrhoids, repeat in 10 years. RADIOLOGY:  2/2018. Ultrasound of liver. Normal appearing liver. No liver mass lesions. Immobile gallstone in the neck of the gallbladder. 8/2018. Ultrasound of liver. Normal appearing liver. No liver mass lesions. Immobile gallstone in the neck of the gallbladder, unchanged. OTHER TESTING:  Not available or performed    ASSESSMENT AND PLAN:  Cirrhosis secondary to several etiologies which include treated Rashawn disease, RIVERA and sarcoidosis. The ALP is elevated secondary to hepatic sarcoidosis. There is no treatment for hepatic sarcoidosis. Steroids are ineffective except in reducing fevers, anorexia and tender hepatomegaly in severe inflammatory sarcoidosis which is not the case here. He is without symptoms of respiratory involvement. Serum copper and ceruloplasmin are low consistent with Rashawn disease. [place] disease genetic testing is consistent with 2 mutations, both of which are associated with Rashawn disease. The patient has been treated for Rashawn disease with Zinc for many years. Will continue this treatment. He had urinary copper level done ~2 years ago, will repeat testing at this time. Liver function is normal.  The platelet count is normal.      The patient was directed to continue all current medications at the current dosages. There are no contraindications for the patient to take any medications that are necessary for treatment of other medical issues.     The patient was counseled regarding diet and exercise to achieve weight loss.  The best diet for patients with fatty liver is one very low in carbohydrates and enriched with protein such as an Flora's program.  He has had great success with this course and is at target weight. Patient understands rationale for monitoring of cirrhosis complications. He will need to have US of the liver every 6 months to rule out [address] I have also recommended that he have screening EGD every 2-3 years, this should be done again in late 2019. RUQ pain and intermittent nausea. Patient has had evaluation with Dr Peri Wright and elected not to pursue surgery. He will notify me if any changes in his symptoms. The patient was counseled regarding alcohol consumption. Vaccination for viral hepatitis A is not needed. The patient has serologic evidence of prior exposure or vaccination with immunity. All of the above issues were discussed with the patient. All questions were answered. The patient expressed a clear understanding of the above. [facility] in 6 months with repeat US of the liver, alternating with local MD every other 6 months.     Donnell Echeverria PA-C  Liver [place] [address], [address] [address]  22.  [phone]

## 2018-08-13 NOTE — PROGRESS NOTES
1. Have you been to the ER, urgent care clinic since your last visit? Hospitalized since your last visit? No    2. Have you seen or consulted any other health care providers outside of the [facility] since your last visit? Include any pap smears or colon screening. No   Chief Complaint   Patient presents with    Follow-up     Visit Vitals    BP 96/68 (BP 1 Location: Left arm, BP Patient Position: Sitting)    Pulse 71    Temp 97.6 °F (36.4 °C) (Tympanic)    Wt 174 lb (78.9 kg)    SpO2 100%    BMI 24.27 kg/m2     PHQ over the last two weeks 8/13/2018   Little interest or pleasure in doing things Not at all   Feeling down, depressed, irritable, or hopeless Not at all   Total Score PHQ 2 0     Learning Assessment 8/13/2018   PRIMARY LEARNER Patient   BARRIERS PRIMARY LEARNER NONE   CO-LEARNER CAREGIVER No   PRIMARY LANGUAGE ENGLISH   LEARNER PREFERENCE PRIMARY LISTENING   ANSWERED BY patient   RELATIONSHIP SELF     Abuse Screening Questionnaire 8/13/2018   Do you ever feel afraid of your partner? N   Are you in a relationship with someone who physically or mentally threatens you? N   Is it safe for you to go home?  Gera Leung

## 2018-08-14 LAB
AFP L3 MFR SERPL: NORMAL % (ref 0–9.9)
AFP SERPL-MCNC: 1.2 NG/ML (ref 0–8)
ALBUMIN SERPL-MCNC: 4.7 G/DL (ref 3.5–5.5)
ALP SERPL-CCNC: 130 IU/L (ref 39–117)
ALT SERPL-CCNC: 46 IU/L (ref 0–44)
AST SERPL-CCNC: 45 IU/L (ref 0–40)
BILIRUB DIRECT SERPL-MCNC: 0.18 MG/DL (ref 0–0.4)
BILIRUB SERPL-MCNC: 0.7 MG/DL (ref 0–1.2)
BUN SERPL-MCNC: 17 MG/DL (ref 6–24)
BUN/CREAT SERPL: 19 (ref 9–20)
CALCIUM SERPL-MCNC: 9.7 MG/DL (ref 8.7–10.2)
CHLORIDE SERPL-SCNC: 101 MMOL/L (ref 96–106)
CHOLEST SERPL-MCNC: 190 MG/DL (ref 100–199)
CO2 SERPL-SCNC: 25 MMOL/L (ref 20–29)
CREAT SERPL-MCNC: 0.89 MG/DL (ref 0.76–1.27)
ERYTHROCYTE [DISTWIDTH] IN BLOOD BY AUTOMATED COUNT: 14.1 % (ref 12.3–15.4)
GLUCOSE SERPL-MCNC: 88 MG/DL (ref 65–99)
HCT VFR BLD AUTO: 42.6 % (ref 37.5–51)
HDLC SERPL-MCNC: 44 MG/DL
HGB BLD-MCNC: 14.3 G/DL (ref 13–17.7)
INR PPP: 1.2 (ref 0.8–1.2)
LDLC SERPL CALC-MCNC: 132 MG/DL (ref 0–99)
MCH RBC QN AUTO: 31 PG (ref 26.6–33)
MCHC RBC AUTO-ENTMCNC: 33.6 G/DL (ref 31.5–35.7)
MCV RBC AUTO: 92 FL (ref 79–97)
PLATELET # BLD AUTO: 232 X10E3/UL (ref 150–379)
POTASSIUM SERPL-SCNC: 4.8 MMOL/L (ref 3.5–5.2)
PROTHROMBIN TIME: 12.4 SEC (ref 9.1–12)
RBC # BLD AUTO: 4.62 X10E6/UL (ref 4.14–5.8)
SODIUM SERPL-SCNC: 141 MMOL/L (ref 134–144)
TRIGL SERPL-MCNC: 68 MG/DL (ref 0–149)
VLDLC SERPL CALC-MCNC: 14 MG/DL (ref 5–40)
WBC # BLD AUTO: 5.8 X10E3/UL (ref 3.4–10.8)

## 2018-08-15 LAB — COPPER SERPL-MCNC: 14 UG/DL (ref 72–166)

## 2018-08-28 LAB
COPPER 24H UR-MRATE: 18 UG/24 HR (ref 3–35)
COPPER UR-MCNC: 6 UG/L
COPPER/CREAT UR: 18 UG/G CREAT (ref 0–49)
CREAT UR-MCNC: 0.33 G/L (ref 0.3–3)

## 2018-08-31 NOTE — PROGRESS NOTES
Pt notified of results, reviewed with Dr Jasbir Pereira. Low serum CU and urine WNL on zinc therapy. No changes to plan and follow-up in 6 months as scheduled.

## 2019-02-13 ENCOUNTER — OFFICE VISIT (OUTPATIENT)
Dept: HEMATOLOGY | Age: 59
End: 2019-02-13

## 2019-02-13 ENCOUNTER — HOSPITAL ENCOUNTER (OUTPATIENT)
Dept: ULTRASOUND IMAGING | Age: 59
Discharge: HOME | End: 2019-02-13
Attending: PHYSICIAN ASSISTANT
Payer: COMMERCIAL

## 2019-02-13 VITALS
BODY MASS INDEX: 23.07 KG/M2 | WEIGHT: 164.8 LBS | SYSTOLIC BLOOD PRESSURE: 115 MMHG | TEMPERATURE: 96.6 F | DIASTOLIC BLOOD PRESSURE: 67 MMHG | OXYGEN SATURATION: 100 % | HEART RATE: 70 BPM | HEIGHT: 71 IN

## 2019-02-13 DIAGNOSIS — E83.01 WILSON'S DISEASE: ICD-10-CM

## 2019-02-13 DIAGNOSIS — K74.60 CIRRHOSIS OF LIVER WITHOUT ASCITES, UNSPECIFIED HEPATIC CIRRHOSIS TYPE (HCC): Primary | ICD-10-CM

## 2019-02-13 DIAGNOSIS — K74.60 CIRRHOSIS OF LIVER WITHOUT ASCITES, UNSPECIFIED HEPATIC CIRRHOSIS TYPE (HCC): ICD-10-CM

## 2019-02-13 PROCEDURE — 76700 US EXAM ABDOM COMPLETE: CPT

## 2019-02-13 NOTE — PROGRESS NOTES
Chief Complaint   Patient presents with    Follow-up     Visit Vitals  /67 (BP 1 Location: Left arm, BP Patient Position: Sitting)   Pulse 70   Temp 96.6 °F (35.9 °C) (Tympanic)   Ht 5' 11\" (1.803 m)   Wt 164 lb 12.8 oz (74.8 kg)   SpO2 100%   BMI 22.98 kg/m²     3 most recent PHQ Screens 2/13/2019   Little interest or pleasure in doing things Several days   Feeling down, depressed, irritable, or hopeless Not at all   Total Score PHQ 2 1     1. Have you been to the ER, urgent care clinic since your last visit? Hospitalized since your last visit? No    2. Have you seen or consulted any other health care providers outside of the [address] [place] [address] since your last visit? Include any pap smears or colon screening.  No

## 2019-02-13 NOTE — PROGRESS NOTES
[address] Ita Smith MD, [address], [facility], [place]       REJI Conroy, CORAL Jeter, [facility]   REJI Sung NP        at [facility]     [address], [address] [address]     [phone]     FAX: [phone]    at [facility]     [address], [address]     [address] anurag Chong, [address]     [phone]     FAX: [phone]       Patient Care Team:  Reny Kulkarni as PCP - Mikayla Blake MD (Gastroenterology)      Problem List  Date Reviewed: 8/13/2018          Codes Class Noted    Cholelithiasis ICD-10-CM: K80.20  ICD-9-CM: 574.20  2/20/2018        Cirrhosis of liver without ascites ([facility] ICD-10-CM: K74.60  ICD-9-CM: 571.5  5/11/2017        RIVERA (nonalcoholic steatohepatitis) ICD-10-CM: K75.81  ICD-9-CM: 571.8  5/11/2017        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 The active problem list, all pertinent past medical history, medications, radiologic findings and laboratory findings related to the liver disorder were reviewed with the patient. The patient is a 63 y.o.  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 He was monitored at [facility]. He was initially treated with Penicillamine. He has been treated on maintenance Zinc for the past ~10 years. He has continued to tolerate this medication well and has no new complaints at this time. His last 24 hour urine was several years ago.      In 2016 he was found to have enlarged lymph nodes on chest XR and was found to have sarcoidosis, he is without other symptoms of sarcoidosis. He has continued to follow with pulmonary and has annual CT of the chest done, last 6/2018. He has had no development of respiratory distress issues. He was also noted at that time to have elevated liver enzymes. Serologic evaluation for markers of chronic liver disease are positive for a low ceruloplasmin and low serum copper. The most recent imaging of the liver was Ultrasound was performed this morning and show normal appearing liver without mass/lesion. Past Fibroscan was 21.8 kPa which correlates with cirrhosis. We plan to repeat this in the office today. The patient underwent a liver biopsy in 4/2017. This demonstrates features of RIVERA, sarcoid granulomas and cirrhosis. The patient has no symptoms which could be attributed to the liver disorder. The patient completes all daily activities without any functional limitations. The patient has not experienced fatigue, swelling of the LE or abdomen, or memory issues. Of note, the patient has lost ~55+# over the course of the past 12-18 months by following low-carbohydrate diet and increasing activity with walking >10,000 steps daily. He has had vague symptoms of pain in the right side over the liver, occasionally sharp in quality for the past ~1 year. He does not relate this to diet, bowel habits, or fever. We had sent him for evaluation with Dr Candice Ko for consideration of gallstone removal.  It was thought that these symptoms were more likely costochondral in nature and no surgery was performed. He is doing well without new symptoms and states that there has been no progression over time. .    ALLERGIES  No Known Allergies    MEDICATIONS  Current Outpatient Medications   Medication Sig    OTHER Vitamin K2 MK-9 200    OTHER PC Liver and Brain Benefit    OTHER Collagen Marine Powder    OTHER Collagen Biocell with hyalauronic acid    inulin (FIBER GUMMIES PO) Take  by mouth.  psyllium (METAMUCIL) powd Take  by mouth.  MULTIVITAMIN PO Take  by mouth. Takes one po once daily.  omeprazole (PRILOSEC) 40 mg capsule 1 (ONE) CAPSULE DR BY MOUTH DAILY    GALZIN 50 mg (zinc) cap Take 50 mg by mouth three (3) times daily.  OMEGA-3 FATTY ACIDS/FISH OIL (OMEGA 3 FISH OIL PO) Take 1 Cap by mouth daily. No current facility-administered medications for this visit. SYSTEM REVIEW NOT RELATED TO LIVER DISEASE OR REVIEWED ABOVE:  Constitution systems: Negative for fever, chills, weight gain. Continued weight loss ~9 # since last office visit. Patient is at about his target weight. Eyes: Negative for visual changes. ENT: Negative for sore throat, painful swallowing. Respiratory: Negative for cough, hemoptysis, SOB. Cardiology: Negative for chest pain, palpitations. GI:  Negative for constipation or diarrhea. Occasional RUQ pain. : Negative for urinary frequency, dysuria, hematuria, nocturia. Skin: Negative for rash. Hematology: Negative for easy bruising, blood clots. Musculo-skeletal: Negative for back pain, muscle pain, weakness. Neurologic: Negative for headaches, dizziness, vertigo, memory problems not related to HE. Psychology: Negative for anxiety, depression. FAMILY HISTORY:  The father  of accident. The mother  of glioblastoma. A brother  of Rashawn diease. Several siblings have Rashawn disease. SOCIAL HISTORY:  The patient is . The patient has no children. The patient has never used tobacco products. The patient consumes alcohol on rare social occasions never in excess. The patient currently works full time  and cow farmer. PHYSICAL EXAMINATION:  /67 (BP 1 Location: Left arm, BP Patient Position: Sitting)   Pulse 70   Temp 96.6 °F (35.9 °C) (Tympanic)   Ht 5' 11\" (1.803 m)   Wt 164 lb 12.8 oz (74.8 kg)   SpO2 100%   BMI 22.98 kg/m²   General: No acute distress. Eyes: Sclera anicteric. ENT: No oral lesions.   Thyroid normal.  Nodes: No adenopathy. Skin: No spider angiomata. No jaundice. No palmar erythema. Respiratory: Lungs clear to auscultation. Cardiovascular: Regular heart rate. No murmurs. No JVD. Abdomen: Soft non-tender. Liver size normal to percussion/palpation. Spleen not palpable. No obvious ascites. Extremities: No edema. No muscle wasting. No gross arthritic changes. Neurologic: Alert and oriented. Cranial nerves grossly intact. No asterixis.     LABORATORY STUDIES:  From 11/2018  AST/ALT/ALP/T Bili/ALB:53/70/138/0.5/4.8  BUN/CREAT:16/0.85  AFP is 1.2    [facility] of [address] Units 8/13/2018   WBC 3.4 - 10.8 x10E3/uL 5.8   ANC 1.4 - 7.0 x10E3/uL    HGB 13.0 - 17.7 g/dL 14.3    - 379 x10E3/uL 232   INR 0.8 - 1.2 1.2   AST 0 - 40 IU/L 45 (H)   ALT 0 - 44 IU/L 46 (H)   Alk Phos 39 - 117 IU/L 130 (H)   Bili, Total 0.0 - 1.2 mg/dL 0.7   Bili, Direct 0.00 - 0.40 mg/dL 0.18   Albumin 3.5 - 5.5 g/dL 4.7   BUN 6 - 24 mg/dL 17   Creat 0.76 - 1.27 mg/dL 0.89   Na 134 - 144 mmol/L 141   K 3.5 - 5.2 mmol/L 4.8   Cl 96 - 106 mmol/L 101   CO2 20 - 29 mmol/L 25   Glucose 65 - 99 mg/dL 88     From 5/11/2018  AST/ALT/ALP/T Bili/ALB: 39/54/148/0.5/4.8  WBC/HB/PLT/INR: 5.6/14.6/216  BUN/CREAT: 19/1.02    From 11/2017  AST/ALT/ALP/T Bili/ALB: 39/61/163/0.5/4.7  WBC/HB/PLT/INR:7.9/14.7/221  BUN/CREAT:19/0.86    Liver [place] of [address] [place] [address] Ref Rng & Units 8/11/2017 2/16/2017 2/3/2017   WBC 3.4 - 10.8 x10E3/uL 6.9 7.6 10.3   ANC 1.4 - 7.0 x10E3/uL 3.6 4.0 5.8   HGB 12.6 - 17.7 g/dL 14.7 14.9 15.1    - 379 x10E3/uL 239 299 261   INR 0.8 - 1.2  1.1    AST 0 - 40 IU/L 39 39 35   ALT 0 - 44 IU/L 47 (H) 42 44   Alk Phos 39 - 117 IU/L 160 (H) 178 (H) 207 (H)   Bili, Total 0.0 - 1.2 mg/dL 0.6 0.5 0.6   Bili, Direct 0.00 - 0.40 mg/dL 0.15 0.14 0.17   Albumin 3.5 - 5.5 g/dL 4.5 4.2 4.4   BUN 6 - 24 mg/dL 19  17   Creat 0.76 - 1.27 mg/dL 0.88  0.90   Na 134 - 144 mmol/L 140  139   K 3.5 - 5.2 mmol/L 4.5  4.3   Cl 96 - 106 mmol/L 100  100   CO2 18 - 29 mmol/L 27  25   Glucose 65 - 99 mg/dL 83  100 (H)     Cancer Screening Latest Ref Rng & Units 2/12/2018 8/11/2017   AFP, Serum 0.0 - 8.0 ng/mL 1.2 1.1   AFP-L3% 0.0 - 9.9 % Comment Comment   Additional lab values drawn at today's office visit are pending at the time of documentation. SEROLOGIES:  Serologies Latest Ref Rng & Units 2/3/2017   Hep A Ab, Total Negative Positive (A)   Hep B Surface Ag Negative Negative   Hep B Core Ab, Total Negative Negative   Hep B Surface AB QL  Non Reactive   Hep C Ab 0.0 - 0.9 s/co ratio <0.1   MANJINDER, IFA  Negative   C-ANCA Neg:<1:20 titer <1:20   P-ANCA Neg:<1:20 titer <1:20   ANCA Neg:<1:20 titer <1:20   ASMCA 0 - 19 Units 19   M2 Ab 0.0 - 20.0 Units 14.7   Ceruloplasmin 16.0 - 31.0 mg/dL 4.9 (L)   2/2017. Rashawn disease genetic testing. Two variants in ATP7B gene are associated with Rashawn disease. This is consistent with Rashawn disease. LIVER HISTOLOGY:  2/2017. FibroScan performed at [facility]. EkPa was 21.8. IQR/med 28%. The results suggested a fibrosis level of F4.  4/2017. Slides reviewed by MLS. Sarcoid granulomas, RIVERA. 25-33% macro and micovesicular steatosis. Mild ballooning. Mild inflammation. Cirrhosis. 2/2019. FibroScan performed at [facility]. EkPa was 10.9. Suggested fibrosis level is F3. CAP score is 209, this is consistent with no increased steatosis. ENDOSCOPIC PROCEDURES:  9/2016. EGD by Dr Leana Gage. No esophageal varices. Gastritis. 6/2018. Colonoscopy by Dr Leana Gage. Internal hemorrhoids, repeat in 10 years. RADIOLOGY:  2/2018. Ultrasound of liver. Normal appearing liver. No liver mass lesions. Immobile gallstone in the neck of the gallbladder. 8/2018. Ultrasound of liver. Normal appearing liver. No liver mass lesions. Immobile gallstone in the neck of the gallbladder, unchanged. 2/2019. Ultrasound of liver. Normal appearing liver. No liver mass lesions. Gallstones, no evidence of infection or inflammation. OTHER TESTING:  Not available or performed    ASSESSMENT AND PLAN:  Cirrhosis secondary to several etiologies which include treated Rashawn disease, RIVERA and sarcoidosis. The ALP is elevated secondary to hepatic sarcoidosis. There is no treatment for hepatic sarcoidosis. Steroids are ineffective except in reducing fevers, anorexia and tender hepatomegaly in severe inflammatory sarcoidosis which is not the case here. He is without symptoms of respiratory involvement. Serum copper and ceruloplasmin are low consistent with Rashawn disease. [place] disease genetic testing is consistent with 2 mutations, both of which are associated with Rashawn disease. The patient has been treated for Rashawn disease with Zinc for many years. Will continue this treatment as he is tolerating well and recent evaluation with urinary copper and serum copper levels have shown excellent response. Liver function is normal.  The platelet count is normal.      The patient was directed to continue all current medications at the current dosages. There are no contraindications for the patient to take any medications that are necessary for treatment of other medical issues. The patient was counseled regarding diet and exercise to achieve weight loss. He has had great success with this course and is at target weight. Will plan on maintenance. I have reviewed with him the results of the recent Fibroscan today which suggest significant improvement in fibrosis scoring. This is likely in largely part due to his significant weight losses. There is no evidence of steatosis on the scan today. Patient understands rationale for monitoring of cirrhosis complications. He will need to have US of the liver every 6 months to rule out [address]   I have also recommended that he have screening EGD every 2-3 years, this should be done again in late 2019. He will plan to pursue with local GI physician who is also monitoring for response to omeprazole. The patient was counseled regarding alcohol consumption. He is a non-drinker. Vaccination for viral hepatitis A is not needed. The patient has serologic evidence of prior exposure or vaccination with immunity. All of the above issues were discussed with the patient. All questions were answered. The patient expressed a clear understanding of the above. [facility] [place] [address] in 6 months with repeat US of the liver, alternating with local MD every other 6 months.     Sabina Morales PA-C  Liver [place] [address], [address] [address]  [address].  [phone]

## 2019-02-14 LAB
AFP L3 MFR SERPL: NORMAL % (ref 0–9.9)
AFP SERPL-MCNC: 1.1 NG/ML (ref 0–8)
ALBUMIN SERPL-MCNC: 4.7 G/DL (ref 3.5–5.5)
ALP SERPL-CCNC: 122 IU/L (ref 39–117)
ALT SERPL-CCNC: 56 IU/L (ref 0–44)
AST SERPL-CCNC: 49 IU/L (ref 0–40)
BILIRUB DIRECT SERPL-MCNC: 0.18 MG/DL (ref 0–0.4)
BILIRUB SERPL-MCNC: 0.6 MG/DL (ref 0–1.2)
BUN SERPL-MCNC: 16 MG/DL (ref 6–24)
BUN/CREAT SERPL: 20 (ref 9–20)
CALCIUM SERPL-MCNC: 9.5 MG/DL (ref 8.7–10.2)
CHLORIDE SERPL-SCNC: 100 MMOL/L (ref 96–106)
CO2 SERPL-SCNC: 24 MMOL/L (ref 20–29)
CREAT SERPL-MCNC: 0.8 MG/DL (ref 0.76–1.27)
ERYTHROCYTE [DISTWIDTH] IN BLOOD BY AUTOMATED COUNT: 14.2 % (ref 12.3–15.4)
GLUCOSE SERPL-MCNC: 88 MG/DL (ref 65–99)
HCT VFR BLD AUTO: 41.2 % (ref 37.5–51)
HGB BLD-MCNC: 13.9 G/DL (ref 13–17.7)
INR PPP: 1.2 (ref 0.8–1.2)
MCH RBC QN AUTO: 31.1 PG (ref 26.6–33)
MCHC RBC AUTO-ENTMCNC: 33.7 G/DL (ref 31.5–35.7)
MCV RBC AUTO: 92 FL (ref 79–97)
PLATELET # BLD AUTO: 231 X10E3/UL (ref 150–379)
POTASSIUM SERPL-SCNC: 4.3 MMOL/L (ref 3.5–5.2)
PROTHROMBIN TIME: 12.1 SEC (ref 9.1–12)
RBC # BLD AUTO: 4.47 X10E6/UL (ref 4.14–5.8)
SODIUM SERPL-SCNC: 140 MMOL/L (ref 134–144)
WBC # BLD AUTO: 5.3 X10E3/UL (ref 3.4–10.8)

## 2019-02-16 LAB — COPPER SERPL-MCNC: 15 UG/DL (ref 72–166)

## 2019-02-18 NOTE — PROGRESS NOTES
Pt notified of stable lab findings and negative US. Continue with Zinc therapy and follow-up in 6 months as planned.

## 2019-05-20 ENCOUNTER — OFFICE VISIT (OUTPATIENT)
Dept: SURGERY | Age: 59
End: 2019-05-20

## 2019-05-20 VITALS
OXYGEN SATURATION: 98 % | RESPIRATION RATE: 16 BRPM | HEART RATE: 75 BPM | TEMPERATURE: 97.8 F | HEIGHT: 71 IN | WEIGHT: 163 LBS | BODY MASS INDEX: 22.82 KG/M2 | DIASTOLIC BLOOD PRESSURE: 70 MMHG | SYSTOLIC BLOOD PRESSURE: 118 MMHG

## 2019-05-20 DIAGNOSIS — K80.20 CALCULUS OF GALLBLADDER WITHOUT CHOLECYSTITIS WITHOUT OBSTRUCTION: Primary | ICD-10-CM

## 2019-05-20 NOTE — PROGRESS NOTES
Subjective:      Marciano Hirsch  is a 63 y.o.  male who presents for re-evaluation of gallbladder problems. Pt initially presented on 02/19/18 with gallstones. Pt has a complex medical history and I did not recommend any surgical intervention as his gallstones were asymptomatic. Today, pt states he has intermittent sharp RUQ pain and nausea. Pt reports his symptoms are worse than the last time he was evaluated. Pt's wife notes that pt is being followed by the [facility] for liver cirrhosis. She notes they have requested a biopsy of the liver while he is under for cholecystectomy. Patient's wife is present and is contributing to the HPI. Past Medical History:   Diagnosis Date    Acid reflux disease     Bell's palsy     Cholelithiasis 2/20/2018    Gastritis     GERD (gastroesophageal reflux disease)     H/O chest x-ray     H/O CT scan of abdomen 12/16/2016    H/O CT scan of chest     History of MRI of brain and brain stem 10/27/2016    Sarcoidosis     Rashawn disease     Rashawn's disease        Past Surgical History:   Procedure Laterality Date    HX COLONOSCOPY      HX ENDOSCOPY  09/06/2016       Social History     Tobacco Use    Smoking status: Never Smoker    Smokeless tobacco: Never Used   Substance Use Topics    Alcohol use: No       Family History   Problem Relation Age of Onset    Cancer Mother         brain       Current Outpatient Medications on File Prior to Visit   Medication Sig Dispense Refill    OTHER Vitamin K2 MK-9 200      OTHER PC Liver and Brain Benefit      OTHER Collagen Marine Powder      OTHER Collagen Biocell with hyalauronic acid      inulin (FIBER GUMMIES PO) Take  by mouth.  psyllium (METAMUCIL) powd Take  by mouth.  MULTIVITAMIN PO Take  by mouth. Takes one po once daily.  omeprazole (PRILOSEC) 40 mg capsule 1 (ONE) CAPSULE DR BY MOUTH DAILY  3    GALZIN 50 mg (zinc) cap Take 50 mg by mouth three (3) times daily.       OMEGA-3 FATTY ACIDS/FISH OIL (OMEGA 3 FISH OIL PO) Take 1 Cap by mouth daily. No current facility-administered medications on file prior to visit. No Known Allergies      Review of Systems:    Pertinent items are noted in the History of Present Illness. Objective:     Visit Vitals  /70 (BP 1 Location: Left arm, BP Patient Position: Sitting)   Pulse 75   Temp 97.8 °F (36.6 °C) (Oral)   Resp 16   Ht 5' 11\" (1.803 m)   Wt 163 lb (73.9 kg)   SpO2 98%   BMI 22.73 kg/m²        Physical Exam:  ABDOMEN: Abdomen is soft. Liver is not palpable. Tender to palpation in the epigastrium and RUQ. Labs: No results found for this or any previous visit (from the past 24 hour(s)). Assessment and Plan:       ICD-10-CM ICD-9-CM    1. Calculus of gallbladder without cholecystitis without obstruction K80.20 574.20        I recommend they have a laparoscopic cholecystectomy to be done as an outpatient, under general anesthesia. I explained how this is due to genetics and that it will likely continue to be symptomatic until the gall bladder is removed. I thoroughly explained their diagnosis, discussed the procedure, and discussed what they should expect for recovery. I advised them to take a least 3-5 days off from work to allow ample time for them to recover. Will also plan on taking a biopsy of pt's liver. All questions were answered. He agrees with this plan and would like to take some time to think it over. Pt will call to schedule. This document was scribed by Jennifer Alonso as dictated by Dr. Kaley Dumont.      Signed By: Rusty Kohli MD     05/20/19

## 2019-05-20 NOTE — LETTER
5/20/19 Patient: Cherelle Cheung YOB: 1960 Date of Visit: 5/20/2019 SIMEON Alonso 
[place] [address] VIA Facsimile: [phone] Dear SIMEON Alonso, Thank you for referring Mr. Cherelle Cheung to [facility] for evaluation. My notes for this consultation are attached. If you have questions, please do not hesitate to call me. I look forward to following your patient along with you. Sincerely, Jack Schmidt MD

## 2019-05-20 NOTE — Clinical Note
He will call to Schedule lap vj:1 hr; outpatient; general anesthesia; laparoscopic liver biopsy as well

## 2019-06-13 ENCOUNTER — ANESTHESIA EVENT (OUTPATIENT)
Dept: SURGERY | Age: 59
End: 2019-06-13
Payer: COMMERCIAL

## 2019-06-14 ENCOUNTER — HOSPITAL ENCOUNTER (OUTPATIENT)
Dept: GENERAL RADIOLOGY | Age: 59
Discharge: HOME | End: 2019-06-14
Attending: SURGERY
Payer: COMMERCIAL

## 2019-06-14 ENCOUNTER — HOSPITAL ENCOUNTER (OUTPATIENT)
Dept: PREADMISSION TESTING | Age: 59
Discharge: HOME | End: 2019-06-14
Payer: COMMERCIAL

## 2019-06-14 VITALS
BODY MASS INDEX: 21.94 KG/M2 | SYSTOLIC BLOOD PRESSURE: 105 MMHG | RESPIRATION RATE: 18 BRPM | HEART RATE: 81 BPM | WEIGHT: 162 LBS | HEIGHT: 72 IN | TEMPERATURE: 97.8 F | DIASTOLIC BLOOD PRESSURE: 61 MMHG

## 2019-06-14 LAB
ATRIAL RATE: 63 BPM
CALCULATED P AXIS, ECG09: 65 DEGREES
CALCULATED R AXIS, ECG10: 83 DEGREES
CALCULATED T AXIS, ECG11: 64 DEGREES
DIAGNOSIS, 93000: NORMAL
P-R INTERVAL, ECG05: 202 MS
Q-T INTERVAL, ECG07: 386 MS
QRS DURATION, ECG06: 92 MS
QTC CALCULATION (BEZET), ECG08: 395 MS
VENTRICULAR RATE, ECG03: 63 BPM

## 2019-06-14 PROCEDURE — 93005 ELECTROCARDIOGRAM TRACING: CPT

## 2019-06-14 PROCEDURE — 71046 X-RAY EXAM CHEST 2 VIEWS: CPT

## 2019-06-14 NOTE — PERIOP NOTES
Preoperative instructions reviewed with patient. Patient given two bottles of CHG soap. Instructions on use of CHG soap. Patient given SSI infection FAQS sheet,  Patient was given the opportunity to ask questions on the information provided.     [address] [facility] referral entered per family request

## 2019-06-17 NOTE — PERIOP NOTES
RECEIVED PULMONARY NOTES FROM DR. SEYMOUR OFFICE IN [place] PLACED ON CHART AND COPY SENT FOR SCANNING.

## 2019-06-20 ENCOUNTER — HOSPITAL ENCOUNTER (OUTPATIENT)
Age: 59
Setting detail: OUTPATIENT SURGERY
Discharge: HOME | End: 2019-06-20
Attending: SURGERY | Admitting: SURGERY
Payer: COMMERCIAL

## 2019-06-20 ENCOUNTER — ANESTHESIA (OUTPATIENT)
Dept: SURGERY | Age: 59
End: 2019-06-20
Payer: COMMERCIAL

## 2019-06-20 VITALS
HEIGHT: 72 IN | DIASTOLIC BLOOD PRESSURE: 72 MMHG | HEART RATE: 57 BPM | TEMPERATURE: 97.8 F | OXYGEN SATURATION: 99 % | SYSTOLIC BLOOD PRESSURE: 115 MMHG | BODY MASS INDEX: 21.81 KG/M2 | RESPIRATION RATE: 16 BRPM | WEIGHT: 161 LBS

## 2019-06-20 DIAGNOSIS — G89.18 POST-OP PAIN: Primary | ICD-10-CM

## 2019-06-20 PROCEDURE — 77030035029 HC NDL INSUF VERES DISP COVD -B: Performed by: SURGERY

## 2019-06-20 PROCEDURE — 74011250636 HC RX REV CODE- 250/636

## 2019-06-20 PROCEDURE — 74011250636 HC RX REV CODE- 250/636: Performed by: ANESTHESIOLOGY

## 2019-06-20 PROCEDURE — 77030003481 HC NDL BIOP GUN BARD -B: Performed by: SURGERY

## 2019-06-20 PROCEDURE — 77030035045 HC TRCR ENDOSC VRSPRT BLDLSS COVD -B: Performed by: SURGERY

## 2019-06-20 PROCEDURE — 88313 SPECIAL STAINS GROUP 2: CPT

## 2019-06-20 PROCEDURE — 76060000034 HC ANESTHESIA 1.5 TO 2 HR: Performed by: SURGERY

## 2019-06-20 PROCEDURE — 77030002933 HC SUT MCRYL J&J -A: Performed by: SURGERY

## 2019-06-20 PROCEDURE — 76210000021 HC REC RM PH II 0.5 TO 1 HR: Performed by: SURGERY

## 2019-06-20 PROCEDURE — 77030017006 HC DISECTR CRV1 J&J -B: Performed by: SURGERY

## 2019-06-20 PROCEDURE — 77030039266 HC ADH SKN EXOFIN S2SG -A: Performed by: SURGERY

## 2019-06-20 PROCEDURE — 88307 TISSUE EXAM BY PATHOLOGIST: CPT

## 2019-06-20 PROCEDURE — 88304 TISSUE EXAM BY PATHOLOGIST: CPT

## 2019-06-20 PROCEDURE — 77030032490 HC SLV COMPR SCD KNE COVD -B: Performed by: SURGERY

## 2019-06-20 PROCEDURE — 77030002895 HC DEV VASC CLOSR COVD -B: Performed by: SURGERY

## 2019-06-20 PROCEDURE — 77030035048 HC TRCR ENDOSC OPTCL COVD -B: Performed by: SURGERY

## 2019-06-20 PROCEDURE — 77030037032 HC INSRT SCIS CLICKLLINE DISP STOR -B: Performed by: SURGERY

## 2019-06-20 PROCEDURE — 76210000016 HC OR PH I REC 1 TO 1.5 HR: Performed by: SURGERY

## 2019-06-20 PROCEDURE — 74011250637 HC RX REV CODE- 250/637: Performed by: SURGERY

## 2019-06-20 PROCEDURE — 77030035051: Performed by: SURGERY

## 2019-06-20 PROCEDURE — 77030020782 HC GWN BAIR PAWS FLX 3M -B

## 2019-06-20 PROCEDURE — 76010000149 HC OR TIME 1 TO 1.5 HR: Performed by: SURGERY

## 2019-06-20 PROCEDURE — 74011000250 HC RX REV CODE- 250

## 2019-06-20 PROCEDURE — 77030018876 HC APPL CLP LIG4 COVD -B: Performed by: SURGERY

## 2019-06-20 PROCEDURE — 74011000250 HC RX REV CODE- 250: Performed by: SURGERY

## 2019-06-20 PROCEDURE — 77030008756 HC TU IRR SUC STRY -B: Performed by: SURGERY

## 2019-06-20 PROCEDURE — 77030018836 HC SOL IRR NACL ICUM -A: Performed by: SURGERY

## 2019-06-20 PROCEDURE — 77030011640 HC PAD GRND REM COVD -A: Performed by: SURGERY

## 2019-06-20 PROCEDURE — 77030009852 HC PCH RTVR ENDOSC COVD -B: Performed by: SURGERY

## 2019-06-20 PROCEDURE — 77030020747 HC TU INSUF ENDOSC TELE -A: Performed by: SURGERY

## 2019-06-20 PROCEDURE — 74011250636 HC RX REV CODE- 250/636: Performed by: SURGERY

## 2019-06-20 PROCEDURE — 77030031139 HC SUT VCRL2 J&J -A: Performed by: SURGERY

## 2019-06-20 RX ORDER — LIDOCAINE HYDROCHLORIDE 20 MG/ML
INJECTION, SOLUTION EPIDURAL; INFILTRATION; INTRACAUDAL; PERINEURAL AS NEEDED
Status: DISCONTINUED | OUTPATIENT
Start: 2019-06-20 | End: 2019-06-20 | Stop reason: HOSPADM

## 2019-06-20 RX ORDER — HYDROCODONE BITARTRATE AND ACETAMINOPHEN 5; 325 MG/1; MG/1
1 TABLET ORAL
Status: COMPLETED | OUTPATIENT
Start: 2019-06-20 | End: 2019-06-20

## 2019-06-20 RX ORDER — SODIUM CHLORIDE 0.9 % (FLUSH) 0.9 %
5-40 SYRINGE (ML) INJECTION EVERY 8 HOURS
Status: DISCONTINUED | OUTPATIENT
Start: 2019-06-20 | End: 2019-06-20 | Stop reason: HOSPADM

## 2019-06-20 RX ORDER — LIDOCAINE HYDROCHLORIDE 10 MG/ML
0.1 INJECTION, SOLUTION EPIDURAL; INFILTRATION; INTRACAUDAL; PERINEURAL AS NEEDED
Status: DISCONTINUED | OUTPATIENT
Start: 2019-06-20 | End: 2019-06-20 | Stop reason: HOSPADM

## 2019-06-20 RX ORDER — SODIUM CHLORIDE 0.9 % (FLUSH) 0.9 %
5-40 SYRINGE (ML) INJECTION AS NEEDED
Status: DISCONTINUED | OUTPATIENT
Start: 2019-06-20 | End: 2019-06-20 | Stop reason: HOSPADM

## 2019-06-20 RX ORDER — FENTANYL CITRATE 50 UG/ML
INJECTION, SOLUTION INTRAMUSCULAR; INTRAVENOUS
Status: COMPLETED
Start: 2019-06-20 | End: 2019-06-20

## 2019-06-20 RX ORDER — HYDROMORPHONE HYDROCHLORIDE 1 MG/ML
0.2 INJECTION, SOLUTION INTRAMUSCULAR; INTRAVENOUS; SUBCUTANEOUS
Status: DISCONTINUED | OUTPATIENT
Start: 2019-06-20 | End: 2019-06-20 | Stop reason: HOSPADM

## 2019-06-20 RX ORDER — SODIUM CHLORIDE, SODIUM LACTATE, POTASSIUM CHLORIDE, CALCIUM CHLORIDE 600; 310; 30; 20 MG/100ML; MG/100ML; MG/100ML; MG/100ML
50 INJECTION, SOLUTION INTRAVENOUS CONTINUOUS
Status: DISCONTINUED | OUTPATIENT
Start: 2019-06-20 | End: 2019-06-20 | Stop reason: HOSPADM

## 2019-06-20 RX ORDER — MORPHINE SULFATE 10 MG/ML
2 INJECTION, SOLUTION INTRAMUSCULAR; INTRAVENOUS
Status: DISCONTINUED | OUTPATIENT
Start: 2019-06-20 | End: 2019-06-20 | Stop reason: HOSPADM

## 2019-06-20 RX ORDER — ONDANSETRON 2 MG/ML
4 INJECTION INTRAMUSCULAR; INTRAVENOUS AS NEEDED
Status: DISCONTINUED | OUTPATIENT
Start: 2019-06-20 | End: 2019-06-20 | Stop reason: HOSPADM

## 2019-06-20 RX ORDER — CEFAZOLIN SODIUM/WATER 2 G/20 ML
2 SYRINGE (ML) INTRAVENOUS ONCE
Status: COMPLETED | OUTPATIENT
Start: 2019-06-20 | End: 2019-06-20

## 2019-06-20 RX ORDER — MIDAZOLAM HYDROCHLORIDE 1 MG/ML
INJECTION, SOLUTION INTRAMUSCULAR; INTRAVENOUS AS NEEDED
Status: DISCONTINUED | OUTPATIENT
Start: 2019-06-20 | End: 2019-06-20 | Stop reason: HOSPADM

## 2019-06-20 RX ORDER — ROCURONIUM BROMIDE 10 MG/ML
INJECTION, SOLUTION INTRAVENOUS AS NEEDED
Status: DISCONTINUED | OUTPATIENT
Start: 2019-06-20 | End: 2019-06-20 | Stop reason: HOSPADM

## 2019-06-20 RX ORDER — FENTANYL CITRATE 50 UG/ML
INJECTION, SOLUTION INTRAMUSCULAR; INTRAVENOUS AS NEEDED
Status: DISCONTINUED | OUTPATIENT
Start: 2019-06-20 | End: 2019-06-20 | Stop reason: HOSPADM

## 2019-06-20 RX ORDER — FENTANYL CITRATE 50 UG/ML
25 INJECTION, SOLUTION INTRAMUSCULAR; INTRAVENOUS
Status: DISCONTINUED | OUTPATIENT
Start: 2019-06-20 | End: 2019-06-20 | Stop reason: HOSPADM

## 2019-06-20 RX ORDER — BUPIVACAINE HYDROCHLORIDE AND EPINEPHRINE 5; 5 MG/ML; UG/ML
30 INJECTION, SOLUTION EPIDURAL; INTRACAUDAL; PERINEURAL ONCE
Status: COMPLETED | OUTPATIENT
Start: 2019-06-20 | End: 2019-06-20

## 2019-06-20 RX ORDER — HYDROCODONE BITARTRATE AND ACETAMINOPHEN 5; 325 MG/1; MG/1
1 TABLET ORAL
Qty: 12 TAB | Refills: 0 | Status: SHIPPED | OUTPATIENT
Start: 2019-06-20 | End: 2019-06-23

## 2019-06-20 RX ORDER — ACETAMINOPHEN 10 MG/ML
INJECTION, SOLUTION INTRAVENOUS AS NEEDED
Status: DISCONTINUED | OUTPATIENT
Start: 2019-06-20 | End: 2019-06-20 | Stop reason: HOSPADM

## 2019-06-20 RX ORDER — PROPOFOL 10 MG/ML
INJECTION, EMULSION INTRAVENOUS AS NEEDED
Status: DISCONTINUED | OUTPATIENT
Start: 2019-06-20 | End: 2019-06-20 | Stop reason: HOSPADM

## 2019-06-20 RX ORDER — DEXAMETHASONE SODIUM PHOSPHATE 4 MG/ML
INJECTION, SOLUTION INTRA-ARTICULAR; INTRALESIONAL; INTRAMUSCULAR; INTRAVENOUS; SOFT TISSUE AS NEEDED
Status: DISCONTINUED | OUTPATIENT
Start: 2019-06-20 | End: 2019-06-20 | Stop reason: HOSPADM

## 2019-06-20 RX ORDER — ONDANSETRON 2 MG/ML
INJECTION INTRAMUSCULAR; INTRAVENOUS AS NEEDED
Status: DISCONTINUED | OUTPATIENT
Start: 2019-06-20 | End: 2019-06-20 | Stop reason: HOSPADM

## 2019-06-20 RX ADMIN — PROPOFOL 120 MG: 10 INJECTION, EMULSION INTRAVENOUS at 08:04

## 2019-06-20 RX ADMIN — SODIUM CHLORIDE, POTASSIUM CHLORIDE, SODIUM LACTATE AND CALCIUM CHLORIDE: 600; 310; 30; 20 INJECTION, SOLUTION INTRAVENOUS at 06:33

## 2019-06-20 RX ADMIN — ONDANSETRON 4 MG: 2 INJECTION INTRAMUSCULAR; INTRAVENOUS at 10:23

## 2019-06-20 RX ADMIN — ROCURONIUM BROMIDE 5 MG: 10 INJECTION, SOLUTION INTRAVENOUS at 08:04

## 2019-06-20 RX ADMIN — ROCURONIUM BROMIDE 30 MG: 10 INJECTION, SOLUTION INTRAVENOUS at 08:05

## 2019-06-20 RX ADMIN — MIDAZOLAM HYDROCHLORIDE 2 MG: 1 INJECTION, SOLUTION INTRAMUSCULAR; INTRAVENOUS at 07:55

## 2019-06-20 RX ADMIN — PROPOFOL 40 MG: 10 INJECTION, EMULSION INTRAVENOUS at 08:08

## 2019-06-20 RX ADMIN — ONDANSETRON 4 MG: 2 INJECTION INTRAMUSCULAR; INTRAVENOUS at 08:28

## 2019-06-20 RX ADMIN — Medication 2 G: at 08:12

## 2019-06-20 RX ADMIN — FENTANYL CITRATE 25 MCG: 50 INJECTION, SOLUTION INTRAMUSCULAR; INTRAVENOUS at 10:06

## 2019-06-20 RX ADMIN — FENTANYL CITRATE 100 MCG: 50 INJECTION, SOLUTION INTRAMUSCULAR; INTRAVENOUS at 08:04

## 2019-06-20 RX ADMIN — DEXAMETHASONE SODIUM PHOSPHATE 4 MG: 4 INJECTION, SOLUTION INTRA-ARTICULAR; INTRALESIONAL; INTRAMUSCULAR; INTRAVENOUS; SOFT TISSUE at 08:28

## 2019-06-20 RX ADMIN — HYDROCODONE BITARTRATE AND ACETAMINOPHEN 1 TABLET: 5; 325 TABLET ORAL at 11:21

## 2019-06-20 RX ADMIN — ACETAMINOPHEN 1000 MG: 10 INJECTION, SOLUTION INTRAVENOUS at 08:40

## 2019-06-20 RX ADMIN — FENTANYL CITRATE 50 MCG: 50 INJECTION INTRAMUSCULAR; INTRAVENOUS at 09:40

## 2019-06-20 RX ADMIN — FENTANYL CITRATE 50 MCG: 50 INJECTION, SOLUTION INTRAMUSCULAR; INTRAVENOUS at 09:40

## 2019-06-20 RX ADMIN — LIDOCAINE HYDROCHLORIDE 60 MG: 20 INJECTION, SOLUTION EPIDURAL; INFILTRATION; INTRACAUDAL; PERINEURAL at 08:04

## 2019-06-20 RX ADMIN — PROPOFOL 40 MG: 10 INJECTION, EMULSION INTRAVENOUS at 08:06

## 2019-06-20 RX ADMIN — FENTANYL CITRATE 25 MCG: 50 INJECTION, SOLUTION INTRAMUSCULAR; INTRAVENOUS at 10:25

## 2019-06-20 NOTE — PERIOP NOTES
Pt thrashing about, trying to turn to abd.- not opening eyes. Finally repeating pain pain pain.   Medication to be given

## 2019-06-20 NOTE — BRIEF OP NOTE
BRIEF OPERATIVE NOTE    Date of Procedure: 6/20/2019   Preoperative Diagnosis: GALLSTONES  Postoperative Diagnosis: GALLSTONES    Procedure(s):  LAPAROSCOPIC CHOLECYSTECTOMY WITH LAPAROSCOPIC LIVER BIOPSY  Surgeon(s) and Role:     Demond Casanova MD - Primary         Surgical Assistant: Joni Castillo    Surgical Staff:  Circ-1: Sagrario Leon  Circ-Relief:  Ivania Foley RN  Scrub Tech-1: Megan Paiz  Surg Asst-1: Soso Cease  Event Time In Time Out   Incision Start 9125    Incision Close 0918      Anesthesia: General   Estimated Blood Loss: minimal  Specimens:   ID Type Source Tests Collected by Time Destination   1 : Gallbladder Fresh Gallbladder  Ravi Kee MD 6/20/2019 0848 Pathology   2 : Liver Biopsy Fresh Liver  Ravi Kee MD 6/20/2019 5035 Pathology      Findings: stones   Complications: none  Implants: * No implants in log *    [id number]

## 2019-06-20 NOTE — DISCHARGE INSTRUCTIONS
Patient Education        Cholecystectomy: What to Expect at Home  Your Recovery  After your surgery, it is normal to feel weak and tired for several days after you return home. Your belly may be swollen. If you had laparoscopic surgery, you may also have pain in your shoulder for about 24 hours. You may have gas or need to burp a lot at first, and a few people get diarrhea. The diarrhea usually goes away in 2 to 4 weeks, but it may last longer. How quickly you recover depends on whether you had a laparoscopic or open surgery. · For a laparoscopic surgery, most people can go back to work or their normal routine in 1 to 2 weeks, but it may take longer, depending on the type of work you do. · For an open surgery, it will probably take 4 to 6 weeks before you get back to your normal routine. This care sheet gives you a general idea about how long it will take for you to recover. However, each person recovers at a different pace. Follow the steps below to get better as quickly as possible. How can you care for yourself at home? Activity    · Rest when you feel tired. Getting enough sleep will help you recover.     · Try to walk each day. Start out by walking a little more than you did the day before. Gradually increase the amount you walk. Walking boosts blood flow and helps prevent pneumonia and constipation.     · For about 2 to 4 weeks, avoid lifting anything that would make you strain. This may include a child, heavy grocery bags and milk containers, a heavy briefcase or backpack, cat litter or dog food bags, or a vacuum .     · Avoid strenuous activities, such as biking, jogging, weightlifting, and aerobic exercise, until your doctor says it is okay.     · You may shower 24 to 48 hours after surgery, if your doctor okays it. Pat the cut (incision) dry.  Do not take a bath for the first 2 weeks, or until your doctor tells you it is okay.     · You may drive when you are no longer taking pain medicine and can quickly move your foot from the gas pedal to the brake. You must also be able to sit comfortably for a long period of time, even if you do not plan to go far. You might get caught in traffic.     · For a laparoscopic surgery, most people can go back to work or their normal routine in 1 to 2 weeks, but it may take longer. For an open surgery, it will probably take 4 to 6 weeks before you get back to your normal routine.     · Your doctor will tell you when you can have sex again.    Diet    · Eat smaller meals more often instead of fewer larger meals. You can eat a normal diet, but avoid eating fatty foods for about 1 month. Fatty foods include hamburger, whole milk, cheese, and many snack foods. If your stomach is upset, try bland, low-fat foods like plain rice, broiled chicken, toast, and yogurt.     · Drink plenty of fluids (unless your doctor tells you not to).   · If you have diarrhea, try avoiding spicy foods, dairy products, fatty foods, and alcohol. You can also watch to see if specific foods cause it, and stop eating them. If the diarrhea continues for more than 2 weeks, talk to your doctor.     · You may notice that your bowel movements are not regular right after your surgery. This is common. Try to avoid constipation and straining with bowel movements. You may want to take a fiber supplement every day. If you have not had a bowel movement after a couple of days, ask your doctor about taking a mild laxative. Medicines    · Your doctor will tell you if and when you can restart your medicines. He or she will also give you instructions about taking any new medicines.     · If you take blood thinners, such as warfarin (Coumadin), clopidogrel (Plavix), or aspirin, be sure to talk to your doctor. He or she will tell you if and when to start taking those medicines again. Make sure that you understand exactly what your doctor wants you to do.     · Take pain medicines exactly as directed.   ? If the doctor gave you a prescription medicine for pain, take it as prescribed. ? If you are not taking a prescription pain medicine, take an over-the-counter medicine such as acetaminophen (Tylenol), ibuprofen (Advil, Motrin), or naproxen (Aleve). Read and follow all instructions on the label. ? Do not take two or more pain medicines at the same time unless the doctor told you to. Many pain medicines contain acetaminophen, which is Tylenol. Too much Tylenol can be harmful.     · If you think your pain medicine is making you sick to your stomach:  ? Take your medicine after meals (unless your doctor tells you not to). ? Ask your doctor for a different pain medicine.     · If your doctor prescribed antibiotics, take them as directed. Do not stop taking them just because you feel better. You need to take the full course of antibiotics. Incision care    · If you have strips of tape on the incision, or cut, leave the tape on for a week or until it falls off.     · After 24 to 48 hours, wash the area daily with warm, soapy water, and pat it dry.     · You may have staples to hold the cut together. Keep them dry until your doctor takes them out. This is usually in 7 to 10 days.     · Keep the area clean and dry. You may cover it with a gauze bandage if it weeps or rubs against clothing. Change the bandage every day.    Ice    · To reduce swelling and pain, put ice or a cold pack on your belly for 10 to 20 minutes at a time. Do this every 1 to 2 hours. Put a thin cloth between the ice and your skin. Follow-up care is a key part of your treatment and safety. Be sure to make and go to all appointments, and call your doctor if you are having problems. It's also a good idea to know your test results and keep a list of the medicines you take. When should you call for help? Call 911 anytime you think you may need emergency care. For example, call if:    · You passed out (lost consciousness).     · You are short of breath. Saida Appl your doctor now or seek immediate medical care if:    · You are sick to your stomach and cannot drink fluids.     · You have pain that does not get better when you take your pain medicine.     · You cannot pass stools or gas.     · You have signs of infection, such as:  ? Increased pain, swelling, warmth, or redness. ? Red streaks leading from the incision. ? Pus draining from the incision. ? A fever.     · Bright red blood has soaked through the bandage over your incision.     · You have loose stitches, or your incision comes open.     · You have signs of a blood clot in your leg (called a deep vein thrombosis), such as:  ? Pain in your calf, back of knee, thigh, or groin. ? Redness and swelling in your leg or groin.    Watch closely for any changes in your health, and be sure to contact your doctor if you have any problems. Where can you learn more? Go to http://chris-ishmael.info/. Enter [phone] in the search box to learn more about \"Cholecystectomy: What to Expect at Home. \"  Current as of: March 27, 2018  Content Version: 11.9  © 5137-4525 [employer]. Care instructions adapted under license by [employer] (which disclaims liability or warranty for this information). If you have questions about a medical condition or this instruction, always ask your healthcare professional. [address] any warranty or liability for your use of this information. ______________________________________________________________________    Anesthesia Discharge Instructions    After general anesthesia or intervenous sedation, for 24 hours or while taking prescription Narcotics:  · Limit your activities  · Do not drive or operate hazardous machinery  · If you have not urinated within 8 hours after discharge, please contact your surgeon on call.   · Do not make important personal or business decisions  · Do not drink alcoholic beverages    Report the following to your surgeon:  · Excessive pain, swelling, redness or odor of or around the surgical area  · Temperature over 100.5 degrees  · Nausea and vomiting lasting longer than 4 hours or if unable to take medication  · Any signs of decreased circulation or nerve impairment to extremity:  Change in color, persistent numbness, tingling, coldness or increased pain. · Any questions          Patient Discharge Instructions    Elena Russo / [id number] : 1960    Admitted 2019 Discharged: 2019     Take Home Medications            · It is important that you take the medication exactly as they are prescribed. · Keep your medication in the bottles provided by the pharmacist and keep a list of the medication names, dosages, and times to be taken in your wallet. · Do not take other medications without consulting your doctor. What to do at Home    Recommended diet: Regular Diet,     Recommended activity: Activity as tolerated, may shower whenever you wish          Follow-up Appointments   Procedures    FOLLOW UP VISIT Appointment in: Two Weeks     Standing Status:   Standing     Number of Occurrences:   1     Order Specific Question:   Appointment in     Answer: Two Weeks           Information obtained by :  I understand that if any problems occur once I am at home I am to contact my physician. I understand and acknowledge receipt of the instructions indicated above.                                                                                                                                            Physician's or R.N.'s Signature                                                                  Date/Time                                                                                                                                              Patient or Representative Signature                                                          Date/Time

## 2019-06-20 NOTE — OP NOTES
[address]   OPERATIVE REPORT    Name:  Jeannine Taylor  MR#:  [id number]  :  1960  ACCOUNT #:  [de-identified]  DATE OF SERVICE:  2019    PREOPERATIVE DIAGNOSES:  Gallstones and cirrhosis of the liver. POSTOPERATIVE DIAGNOSES:  Gallstones and cirrhosis of the liver. PROCEDURE PERFORMED:  Laparoscopic cholecystectomy with laparoscopic core liver needle biopsies. SURGEON:  Yaron Nobles MD    ASSISTANT:  Dontrell Bermeo SA    ANESTHESIA:  General supplemented with 0.5% Marcaine with epinephrine. COMPLICATIONS:  None. SPECIMENS REMOVED:  Gallbladder and core liver biopsy x2. IMPLANTS:  None. ESTIMATED BLOOD LOSS:  Minimal.    FINDINGS:  Moderately cirrhotic liver and stones in the gallbladder. CONDITION:  Good to the PACU. PROCEDURE:  With the patient supine and suitably anesthetized, the abdomen was prepared with ChloraPrep and draped as a field. 0.5% Marcaine with epinephrine was infiltrated in the appropriate places. A subumbilical incision was made, a Veress needle was inserted, pneumoperitoneum was established. The needle was removed, and a 5 mm trocar was placed in the umbilical position, and the camera was then inserted, and then two 5 mm trocars were placed in the right upper quadrant and a 12 mm was placed in the midline superiorly, all under direct vision. The patient was placed in reverse Trendelenburg position and turned to the left. The fundus of the gallbladder was grasped and retracted superiorly into the right. There was a moderate edema around the gallbladder, and eventually, I dissected out the cystic artery which was doubly clipped proximally and singly clipped distally and divided. That afforded better exposure of the cystic duct which was then skeletonized and then doubly clipped distally and singly clipped proximally and divided.   The gallbladder was then  from the liver bed, and anything of substance was clipped to prevent any bleeding and also potential leakage from the duct of Luschka. The gallbladder was taken off the liver bed, placed into a retriever bag and brought out through the superior trocar site. That trocar was replaced and the right upper quadrant was irrigated and everything appeared clear. There was no bleeding from the liver bed. Two core biopsies were obtained with the needle biopsy Biopsysis system. These were sent to pathology for permanent sections. The puncture wound in segment V was cauterized and the bleeding stopped. Again, the right upper quadrant was irrigated and everything appeared fine. The patient was then placed in the flat position and the right upper quadrant was irrigated until clear. The 12 mm trocar site was closed with a 0 Vicryl suture passed with an Endoclose device. All the incisions were then closed with subcuticular Monocryl followed by Dermabond. At the termination of the procedure, all counts were correct. The patient tolerated this well and was brought to the PACU in satisfactory condition.       Tanya Edmond MD      [id number]  D:  06/20/2019 9:32  T:  06/20/2019 11:36  JOB #:  [id number]  CC:  MD Velvet Cuellar MD Rayna Mandes, MD Arleen Patches, PA [facility] [place]

## 2019-06-20 NOTE — H&P
Subjective:      Tiffanie Robertson  is a 63 y.o.  male who presents for re-evaluation of gallbladder problems. Pt initially presented on 02/19/18 with gallstones. Pt has a complex medical history and I did not recommend any surgical intervention as his gallstones were asymptomatic. Today, pt states he has intermittent sharp RUQ pain and nausea. Pt reports his symptoms are worse than the last time he was evaluated. Pt's wife notes that pt is being followed by the [facility] for liver cirrhosis. She notes they have requested a biopsy of the liver while he is under for cholecystectomy. Patient's wife is present and is contributing to the HPI. Past Medical History:   Diagnosis Date    Acid reflux disease      Bell's palsy      Cholelithiasis 2/20/2018    Gastritis      GERD (gastroesophageal reflux disease)      H/O chest x-ray      H/O CT scan of abdomen 12/16/2016    H/O CT scan of chest      History of MRI of brain and brain stem 10/27/2016    Sarcoidosis      Rashawn disease      Rashawn's disease                 Past Surgical History:   Procedure Laterality Date    HX COLONOSCOPY        HX ENDOSCOPY   09/06/2016         Social History           Tobacco Use    Smoking status: Never Smoker    Smokeless tobacco: Never Used   Substance Use Topics    Alcohol use: No               Family History   Problem Relation Age of Onset   24 \[facility]\"" Cancer Mother           brain                Current Outpatient Medications on File Prior to Visit   Medication Sig Dispense Refill    OTHER Vitamin K2 MK-9 200        OTHER PC Liver and Brain Benefit        OTHER Collagen Marine Powder        OTHER Collagen Biocell with hyalauronic acid        inulin (FIBER GUMMIES PO) Take  by mouth.  psyllium (METAMUCIL) powd Take  by mouth.  MULTIVITAMIN PO Take  by mouth. Takes one po once daily.         omeprazole (PRILOSEC) 40 mg capsule 1 (ONE) CAPSULE DR BY MOUTH DAILY   3    GALZIN 50 mg (zinc) cap Take 50 mg by mouth three (3) times daily.  OMEGA-3 FATTY ACIDS/FISH OIL (OMEGA 3 FISH OIL PO) Take 1 Cap by mouth daily. No current facility-administered medications on file prior to visit. No Known Allergies        Review of Systems:    Pertinent items are noted in the History of Present Illness. Objective:      Visit Vitals  /70 (BP 1 Location: Left arm, BP Patient Position: Sitting)   Pulse 75   Temp 97.8 °F (36.6 °C) (Oral)   Resp 16   Ht 5' 11\" (1.803 m)   Wt 163 lb (73.9 kg)   SpO2 98%   BMI 22.73 kg/m²         Physical Exam:  ABDOMEN: Abdomen is soft. Liver is not palpable. Tender to palpation in the epigastrium and RUQ. HEART: regular rate and rhythm  LUNGS: clear to auscultation     Labs:    Recent Results   No results found for this or any previous visit (from the past 24 hour(s)). Assessment and Plan:          ICD-10-CM ICD-9-CM     1. Calculus of gallbladder without cholecystitis without obstruction K80.20 574.20           I recommend they have a laparoscopic cholecystectomy to be done as an outpatient, under general anesthesia. I explained how this is due to genetics and that it will likely continue to be symptomatic until the gall bladder is removed. I thoroughly explained their diagnosis, discussed the procedure, and discussed what they should expect for recovery. I advised them to take a least 3-5 days off from work to allow ample time for them to recover. Will also plan on taking a biopsy of pt's liver. All questions were answered. He agrees with this plan and would like to take some time to think it over. Pt will call to schedule.

## 2019-06-20 NOTE — ANESTHESIA PREPROCEDURE EVALUATION
Relevant Problems   No relevant active problems       Anesthetic History   No history of anesthetic complications            Review of Systems / Medical History  Patient summary reviewed, nursing notes reviewed and pertinent labs reviewed    Pulmonary                Comments: sarcoidosis   Neuro/Psych   Within defined limits           Cardiovascular                  Exercise tolerance: >4 METS     GI/Hepatic/Renal     GERD      Liver disease    Comments:  Rashawn dz cirrhosis Endo/Other             Other Findings              Physical Exam    Airway  Mallampati: I  TM Distance: > 6 cm  Neck ROM: normal range of motion   Mouth opening: Normal     Cardiovascular    Rhythm: regular  Rate: normal         Dental  No notable dental hx       Pulmonary                 Abdominal         Other Findings            Anesthetic Plan    ASA: 3  Anesthesia type: general          Induction: Intravenous  Anesthetic plan and risks discussed with: Patient

## 2019-06-20 NOTE — ANESTHESIA POSTPROCEDURE EVALUATION
Post-Anesthesia Evaluation and Assessment    Patient: Rosa Hamilton MRN: [id number]  SSN: [id number]    YOB: 1960  Age: 63 y.o. Sex: male      I have evaluated the patient and they are stable and ready for discharge from the PACU. Cardiovascular Function/Vital Signs  Visit Vitals  /72   Pulse (!) 57   Temp 36.6 °C (97.8 °F)   Resp 16   Ht 6' (1.829 m)   Wt 73 kg (161 lb)   SpO2 99%   BMI 21.84 kg/m²       Patient is status post General anesthesia for Procedure(s):  LAPAROSCOPIC CHOLECYSTECTOMY WITH LAPAROSCOPIC LIVER BIOPSY. Nausea/Vomiting: None    Postoperative hydration reviewed and adequate. Pain:  Pain Scale 1: Numeric (0 - 10) (06/20/19 1121)  Pain Intensity 1: 3 (06/20/19 1121)   Managed    Neurological Status:   Neuro (WDL): Within Defined Limits (06/20/19 1006)  Neuro  LUE Motor Response: Purposeful (06/20/19 1006)  LLE Motor Response: Purposeful (06/20/19 1006)  RUE Motor Response: Purposeful (06/20/19 1006)  RLE Motor Response: Purposeful (06/20/19 1006)   At baseline    Mental Status, Level of Consciousness: Alert and  oriented to person, place, and time    Pulmonary Status:   O2 Device: Room air (06/20/19 1115)   Adequate oxygenation and airway patent    Complications related to anesthesia: None    Post-anesthesia assessment completed. No concerns    Signed By: Romana Paredes MD     June 21, 2019            Post-Anesthesia Evaluation and Assessment    Patient: Rosa Hamilton MRN: [id number]  SSN: [id number]    YOB: 1960  Age: 63 y.o. Sex: male      I have evaluated the patient and they are stable and ready for discharge from the PACU.      Cardiovascular Function/Vital Signs  Visit Vitals  /64   Pulse (!) 56   Temp 36.4 °C (97.6 °F)   Resp 11   Ht 6' (1.829 m)   Wt 73 kg (161 lb)   SpO2 98%   BMI 21.84 kg/m²       Patient is status post General anesthesia for Procedure(s):  LAPAROSCOPIC CHOLECYSTECTOMY WITH LAPAROSCOPIC LIVER BIOPSY. Nausea/Vomiting: None    Postoperative hydration reviewed and adequate. Pain:  Pain Scale 1: Numeric (0 - 10) (06/20/19 1006)  Pain Intensity 1: 5 (06/20/19 1006)   Managed    Neurological Status:   Neuro (WDL): Within Defined Limits (06/20/19 1006)  Neuro  LUE Motor Response: Purposeful (06/20/19 1006)  LLE Motor Response: Purposeful (06/20/19 1006)  RUE Motor Response: Purposeful (06/20/19 1006)  RLE Motor Response: Purposeful (06/20/19 1006)   At baseline    Mental Status, Level of Consciousness: Alert and  oriented to person, place, and time    Pulmonary Status:   O2 Device: Room air (06/20/19 1015)   Adequate oxygenation and airway patent    Complications related to anesthesia: None    Post-anesthesia assessment completed. No concerns    Signed By: Juan Vigil MD     June 20, 2019              Procedure(s):  LAPAROSCOPIC CHOLECYSTECTOMY WITH LAPAROSCOPIC LIVER BIOPSY. general    <BSHSIANPOST>    Vitals Value Taken Time   /64 6/20/2019 10:15 AM   Temp 36.4 °C (97.6 °F) 6/20/2019  9:30 AM   Pulse 60 6/20/2019 10:17 AM   Resp 11 6/20/2019 10:17 AM   SpO2 99 % 6/20/2019 10:17 AM   Vitals shown include unvalidated device data.

## 2019-07-22 ENCOUNTER — OFFICE VISIT (OUTPATIENT)
Dept: SURGERY | Age: 59
End: 2019-07-22

## 2019-07-22 VITALS
DIASTOLIC BLOOD PRESSURE: 80 MMHG | TEMPERATURE: 97.8 F | RESPIRATION RATE: 18 BRPM | OXYGEN SATURATION: 98 % | WEIGHT: 160 LBS | SYSTOLIC BLOOD PRESSURE: 120 MMHG | BODY MASS INDEX: 21.67 KG/M2 | HEIGHT: 72 IN | HEART RATE: 78 BPM

## 2019-07-22 DIAGNOSIS — Z09 POSTOPERATIVE EXAMINATION: Primary | ICD-10-CM

## 2019-07-22 DIAGNOSIS — K80.20 CALCULUS OF GALLBLADDER WITHOUT CHOLECYSTITIS WITHOUT OBSTRUCTION: ICD-10-CM

## 2019-07-22 DIAGNOSIS — Z90.49 S/P LAPAROSCOPIC CHOLECYSTECTOMY: ICD-10-CM

## 2019-07-22 NOTE — PROGRESS NOTES
1. Have you been to the ER, urgent care clinic since your last visit? Hospitalized since your last visit? No    2. Have you seen or consulted any other health care providers outside of the [address] [place] [address] since your last visit? Include any pap smears or colon screening.  no

## 2019-07-22 NOTE — LETTER
7/22/19 Patient: Maria R Mitchell YOB: 1960 Date of Visit: 7/22/2019 SIMEON Alosno 
[place] [address] VIA Facsimile: [phone] Dear SIMEON Alonso, Thank you for referring Mr. Maria R Mitchell to [facility] for evaluation. My notes for this consultation are attached. If you have questions, please do not hesitate to call me. I look forward to following your patient along with you. Sincerely, Adeline Rios, NP

## 2019-07-22 NOTE — PROGRESS NOTES
Subjective:      Daniela Guillermo is a 63 y.o. male presents for postop care 4.5 weeks following laparoscopic cholecystectomy and liver biopsy by Dr. Yenifer Venegas. Pt with history of Rashawn's disease and has a follow up appointment with Dr. Fanny Oneill next month. Appetite is good. Eating a regular diet. without difficulty. Bowel movements are regular. The patient is not having any pain. .Denies fever, nausea, shortness of breath, chest pain, redness at incision site, vomiting and diarrhea       Pathology:  Chronic cholecystitis with cholelithiasis  Liver biopsy:   Biopsy size: Adequate for evaluation. Inflammation: Lobular granulomatous inflammation. Fibrosis: Score 3, numerous septae with occasional bridges. Large droplet steatosis: Not identified. Ballooning hepatocytes: Not identified. Portal inflammation: Present, mild. Proximal: Present without injury. Ductular reaction: Not identified. Stainable iron: Very minimal hepatocellular iron. Advance directive not on file      Objective:     Visit Vitals  /80   Pulse 78   Temp 97.8 °F (36.6 °C) (Oral)   Resp 18   Ht 6' (1.829 m)   Wt 160 lb (72.6 kg)   SpO2 98%   BMI 21.70 kg/m²       General:  alert, no distress, accompanied by wife   Abdomen: soft, bowel sounds active, non-tender   Incision:   healing well, no drainage, no erythema, no seroma, no swelling, no dehiscence, incisions well approximated   Heart: regular rate and rhythm, S1, S2 normal, no murmur, click, rub or gallop   Lungs: clear to auscultation bilaterally     Assessment:     1. Chronic cholecystitis with cholelithiasis, status post lap vj and liver biopsy. Doing well postoperatively. Plan:     1. Pt is to increase activities as tolerated. May continue full diet  2. Follow-up: PRN  3. Keep f/u appointment with Dr. Fanny Oneill and discuss liver biopsy results with him. Will forward pathology to him. Mr. Bryan Pepper has a reminder for a \"due or due soon\" health maintenance. I have asked that he contact his primary care provider for follow-up on this health maintenance. Patient verbalized understanding and agreement.

## 2019-07-22 NOTE — PATIENT INSTRUCTIONS
Cholecystectomy: What to Expect at [address] [place] [address]  After your surgery, it is normal to feel weak and tired for several days after you return home. Your belly may be swollen. If you had laparoscopic surgery, you may also have pain in your shoulder for about 24 hours. You may have gas or need to burp a lot at first, and a few people get diarrhea. The diarrhea usually goes away in 2 to 4 weeks, but it may last longer. How quickly you recover depends on whether you had a laparoscopic or open surgery. · For a laparoscopic surgery, most people can go back to work or their normal routine in 1 to 2 weeks, but it may take longer, depending on the type of work you do. · For an open surgery, it will probably take 4 to 6 weeks before you get back to your normal routine. This care sheet gives you a general idea about how long it will take for you to recover. However, each person recovers at a different pace. Follow the steps below to get better as quickly as possible. How can you care for yourself at home? Activity    · Rest when you feel tired. Getting enough sleep will help you recover.     · Try to walk each day. Start out by walking a little more than you did the day before. Gradually increase the amount you walk. Walking boosts blood flow and helps prevent pneumonia and constipation.     · For about 2 to 4 weeks, avoid lifting anything that would make you strain. This may include a child, heavy grocery bags and milk containers, a heavy briefcase or backpack, cat litter or dog food bags, or a vacuum .     · Avoid strenuous activities, such as biking, jogging, weightlifting, and aerobic exercise, until your doctor says it is okay.     · You may shower 24 to 48 hours after surgery, if your doctor okays it. Pat the cut (incision) dry.  Do not take a bath for the first 2 weeks, or until your doctor tells you it is okay.     · You may drive when you are no longer taking pain medicine and can quickly move your foot from the gas pedal to the brake. You must also be able to sit comfortably for a long period of time, even if you do not plan to go far. You might get caught in traffic.     · For a laparoscopic surgery, most people can go back to work or their normal routine in 1 to 2 weeks, but it may take longer. For an open surgery, it will probably take 4 to 6 weeks before you get back to your normal routine.     · Your doctor will tell you when you can have sex again.    Diet    · Eat smaller meals more often instead of fewer larger meals. You can eat a normal diet, but avoid eating fatty foods for about 1 month. Fatty foods include hamburger, whole milk, cheese, and many snack foods. If your stomach is upset, try bland, low-fat foods like plain rice, broiled chicken, toast, and yogurt.     · Drink plenty of fluids (unless your doctor tells you not to).   · If you have diarrhea, try avoiding spicy foods, dairy products, fatty foods, and alcohol. You can also watch to see if specific foods cause it, and stop eating them. If the diarrhea continues for more than 2 weeks, talk to your doctor.     · You may notice that your bowel movements are not regular right after your surgery. This is common. Try to avoid constipation and straining with bowel movements. You may want to take a fiber supplement every day. If you have not had a bowel movement after a couple of days, ask your doctor about taking a mild laxative. Medicines    · Your doctor will tell you if and when you can restart your medicines. He or she will also give you instructions about taking any new medicines.     · If you take blood thinners, such as warfarin (Coumadin), clopidogrel (Plavix), or aspirin, be sure to talk to your doctor. He or she will tell you if and when to start taking those medicines again. Make sure that you understand exactly what your doctor wants you to do.     · Take pain medicines exactly as directed.   ? If the doctor gave you a prescription medicine for pain, take it as prescribed. ? If you are not taking a prescription pain medicine, take an over-the-counter medicine such as acetaminophen (Tylenol), ibuprofen (Advil, Motrin), or naproxen (Aleve). Read and follow all instructions on the label. ? Do not take two or more pain medicines at the same time unless the doctor told you to. Many pain medicines contain acetaminophen, which is Tylenol. Too much Tylenol can be harmful.     · If you think your pain medicine is making you sick to your stomach:  ? Take your medicine after meals (unless your doctor tells you not to). ? Ask your doctor for a different pain medicine.     · If your doctor prescribed antibiotics, take them as directed. Do not stop taking them just because you feel better. You need to take the full course of antibiotics. Incision care    · If you have strips of tape on the incision, or cut, leave the tape on for a week or until it falls off.     · After 24 to 48 hours, wash the area daily with warm, soapy water, and pat it dry.     · You may have staples to hold the cut together. Keep them dry until your doctor takes them out. This is usually in 7 to 10 days.     · Keep the area clean and dry. You may cover it with a gauze bandage if it weeps or rubs against clothing. Change the bandage every day.    Ice    · To reduce swelling and pain, put ice or a cold pack on your belly for 10 to 20 minutes at a time. Do this every 1 to 2 hours. Put a thin cloth between the ice and your skin. Follow-up care is a key part of your treatment and safety. Be sure to make and go to all appointments, and call your doctor if you are having problems. It's also a good idea to know your test results and keep a list of the medicines you take. When should you call for help? Call 911 anytime you think you may need emergency care. For example, call if:    · You passed out (lost consciousness).     · You are short of breath. Albino Ion your doctor now or seek immediate medical care if:    · You are sick to your stomach and cannot drink fluids.     · You have pain that does not get better when you take your pain medicine.     · You cannot pass stools or gas.     · You have signs of infection, such as:  ? Increased pain, swelling, warmth, or redness. ? Red streaks leading from the incision. ? Pus draining from the incision. ? A fever.     · Bright red blood has soaked through the bandage over your incision.     · You have loose stitches, or your incision comes open.     · You have signs of a blood clot in your leg (called a deep vein thrombosis), such as:  ? Pain in your calf, back of knee, thigh, or groin. ? Redness and swelling in your leg or groin.    Watch closely for any changes in your health, and be sure to contact your doctor if you have any problems. Where can you learn more? Go to http://chris-ishmael.info/. Enter [phone] in the search box to learn more about \"Cholecystectomy: What to Expect at Home. \"  Current as of: November 7, 2018  Content Version: 12.1  © 8505-1734 [employer]wise, Incorporated. Care instructions adapted under license by [employer] (which disclaims liability or warranty for this information). If you have questions about a medical condition or this instruction, always ask your healthcare professional. [address] any warranty or liability for your use of this information.

## 2019-08-13 ENCOUNTER — HOSPITAL ENCOUNTER (OUTPATIENT)
Dept: ULTRASOUND IMAGING | Age: 59
Discharge: HOME | End: 2019-08-13
Attending: PHYSICIAN ASSISTANT
Payer: COMMERCIAL

## 2019-08-13 ENCOUNTER — OFFICE VISIT (OUTPATIENT)
Dept: HEMATOLOGY | Age: 59
End: 2019-08-13

## 2019-08-13 VITALS
DIASTOLIC BLOOD PRESSURE: 62 MMHG | BODY MASS INDEX: 21.29 KG/M2 | SYSTOLIC BLOOD PRESSURE: 105 MMHG | WEIGHT: 157 LBS | OXYGEN SATURATION: 99 % | TEMPERATURE: 96 F

## 2019-08-13 DIAGNOSIS — K74.60 CIRRHOSIS OF LIVER WITHOUT ASCITES, UNSPECIFIED HEPATIC CIRRHOSIS TYPE (HCC): ICD-10-CM

## 2019-08-13 DIAGNOSIS — E83.01 WILSON'S DISEASE: ICD-10-CM

## 2019-08-13 DIAGNOSIS — K74.60 CIRRHOSIS OF LIVER WITHOUT ASCITES, UNSPECIFIED HEPATIC CIRRHOSIS TYPE (HCC): Primary | ICD-10-CM

## 2019-08-13 PROCEDURE — 76700 US EXAM ABDOM COMPLETE: CPT

## 2019-08-13 NOTE — PROGRESS NOTES
[facility], Thi AGUILERA, MD Torri Garza, CORAL Rivera, [facility]     Manda Erazo, [facility]   Kathie Heard, KEVIN    Levar Stephania, [facility]       [place] [address]vin Chir[address]    at [address], [address] [place] [address] 22.    [phone]    FAX: [address]    at [facility]    [address], [address]    [facility], [address]    [phone]    FAX: [phone]       Patient Care Team:  Reny Estevez as PCP - Lm Cedeno MD (Gastroenterology)      Problem List  Date Reviewed: 6/20/2019          Codes Class Noted    Cholelithiasis ICD-10-CM: K80.20  ICD-9-CM: 574.20  2/20/2018        Cirrhosis of liver without ascites (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 The active problem list, all pertinent past medical history, medications, radiologic findings and laboratory findings related to the liver disorder were reviewed with the patient. The patient is a 63 y.o.  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 He was monitored at [facility]. He was initially treated with Penicillamine. He has been treated on maintenance Zinc for the past ~10 years. He has continued to tolerate this medication well and has no new complaints at this time.   His last 24 hour urine was in 8/2018. This was low. In 2016 he was found to have enlarged lymph nodes on chest XR and was found to have sarcoidosis, he is without other symptoms of sarcoidosis. He has continued to follow with pulmonary and has annual CT of the chest done, last 6/2019 and there were no new concerns. He has had no development of respiratory distress issues. Serologic evaluation for markers of chronic liver disease are positive for a low ceruloplasmin and low serum copper. The most recent imaging of the liver was Ultrasound was performed this morning and showed increased echotexture changes without mass/lesion. Past Fibroscan was 21.8 kPa which correlates with cirrhosis. This was repeated in 2/2019 with a score consistent with F3. The patient underwent a liver biopsy in 4/2017. This demonstrates features of RIVERA, sarcoid granulomas and cirrhosis. This was recently repeated at the time of CCY, showing bridging septa and granulomatous inflammation consistent with sarcoidosis. He has since had reduction in the frequency and intensity of right sided abdominal pain and has no surgical related complaints. The patient has no symptoms which could be attributed to the liver disorder. The patient completes all daily activities without any functional limitations. The patient has not experienced fatigue, swelling of the LE or abdomen, or memory issues. Of note, the patient has lost ~55+# over the course of the past 2 years, sustained, by following low-carbohydrate diet and increasing activity with walking >10,000 steps daily. The only other change to his medications was an increase of omeprazole following his last EGD that demonstrated gastritis. ALLERGIES  No Known Allergies    MEDICATIONS  Current Outpatient Medications   Medication Sig    inulin (FIBER GUMMIES PO) Take  by mouth every morning.  psyllium (METAMUCIL) powd Take  by mouth.  MULTIVITAMIN PO Take  by mouth. Takes one po once daily.  omeprazole (PRILOSEC) 40 mg capsule 1 (ONE) CAPSULE DR BY MOUTH DAILY    GALZIN 50 mg (zinc) cap Take 50 mg by mouth three (3) times daily.  OMEGA-3 FATTY ACIDS/FISH OIL (OMEGA 3 FISH OIL PO) Take 1 Cap by mouth daily.  OTHER Vitamin K2 MK-9 200    OTHER PC Liver and Brain Benefit    OTHER Collagen Marine Powder    OTHER Collagen Biocell with hyalauronic acid     No current facility-administered medications for this visit. SYSTEM REVIEW NOT RELATED TO LIVER DISEASE OR REVIEWED ABOVE:  Constitution systems: Negative for fever, chills, weight gain. Continued, sustained weight loss. Eyes: Negative for visual changes. ENT: Negative for sore throat, painful swallowing. Respiratory: Negative for cough, hemoptysis, SOB. Cardiology: Negative for chest pain, palpitations. GI:  Negative for constipation or diarrhea. Occasional RUQ pain. : Negative for urinary frequency, dysuria, hematuria, nocturia. Skin: Negative for rash. Hematology: Negative for easy bruising, blood clots. Musculo-skeletal: Negative for back pain, muscle pain, weakness. Neurologic: Negative for headaches, dizziness, vertigo, memory problems not related to HE. Psychology: Negative for anxiety, depression. FAMILY HISTORY:  The father  of accident. The mother  of glioblastoma. A brother  of Rashawn diease. Several siblings have Rashawn disease. SOCIAL HISTORY:  The patient is . The patient has no children. The patient has never used tobacco products. The patient consumes alcohol on rare social occasions never in excess. The patient currently works full time  and cow farmer. PHYSICAL EXAMINATION:  /62 (BP 1 Location: Left leg, BP Patient Position: Sitting)   Temp 96 °F (35.6 °C) (Tympanic)   Wt 157 lb (71.2 kg)   SpO2 99%   BMI 21.29 kg/m²   General: No acute distress. Eyes: Sclera anicteric. ENT: No oral lesions. Thyroid normal.  Nodes: No adenopathy. Skin: No spider angiomata. No jaundice. No palmar erythema. Respiratory: Lungs clear to auscultation. Cardiovascular: Regular heart rate. No murmurs. No JVD. Abdomen: Soft non-tender. Liver size normal to percussion/palpation. Spleen not palpable. No obvious ascites. Extremities: No edema. No muscle wasting. No gross arthritic changes. Neurologic: Alert and oriented. Cranial nerves grossly intact. No asterixis.     LABORATORY STUDIES:  From 5/2019  AST/ALT/ALP/T Bili/ALB:48/61/114/0.5/4.8  WBC/HB/PLT/INR:5.3/14.7/213/X  BUN/CREAT:21/0.9  AFP 1.1    From 11/2018  AST/ALT/ALP/T Bili/ALB:53/70/138/0.5/4.8  BUN/CREAT:16/0.85  AFP is 1.2    Liver [place] of [address] Units 8/13/2018   WBC 3.4 - 10.8 x10E3/uL 5.8   ANC 1.4 - 7.0 x10E3/uL    HGB 13.0 - 17.7 g/dL 14.3    - 379 x10E3/uL 232   INR 0.8 - 1.2 1.2   AST 0 - 40 IU/L 45 (H)   ALT 0 - 44 IU/L 46 (H)   Alk Phos 39 - 117 IU/L 130 (H)   Bili, Total 0.0 - 1.2 mg/dL 0.7   Bili, Direct 0.00 - 0.40 mg/dL 0.18   Albumin 3.5 - 5.5 g/dL 4.7   BUN 6 - 24 mg/dL 17   Creat 0.76 - 1.27 mg/dL 0.89   Na 134 - 144 mmol/L 141   K 3.5 - 5.2 mmol/L 4.8   Cl 96 - 106 mmol/L 101   CO2 20 - 29 mmol/L 25   Glucose 65 - 99 mg/dL 88     From 5/11/2018  AST/ALT/ALP/T Bili/ALB: 39/54/148/0.5/4.8  WBC/HB/PLT/INR: 5.6/14.6/216  BUN/CREAT: 19/1.02    From 11/2017  AST/ALT/ALP/T Bili/ALB: 39/61/163/0.5/4.7  WBC/HB/PLT/INR:7.9/14.7/221  BUN/CREAT:19/0.86    Liver [place] of [address] [place] [address] Ref Rng & Units 8/11/2017 2/16/2017 2/3/2017   WBC 3.4 - 10.8 x10E3/uL 6.9 7.6 10.3   ANC 1.4 - 7.0 x10E3/uL 3.6 4.0 5.8   HGB 12.6 - 17.7 g/dL 14.7 14.9 15.1    - 379 x10E3/uL 239 299 261   INR 0.8 - 1.2  1.1    AST 0 - 40 IU/L 39 39 35   ALT 0 - 44 IU/L 47 (H) 42 44   Alk Phos 39 - 117 IU/L 160 (H) 178 (H) 207 (H)   Bili, Total 0.0 - 1.2 mg/dL 0.6 0.5 0.6   Bili, Direct 0.00 - 0.40 mg/dL 0.15 0.14 0.17   Albumin 3.5 - 5.5 g/dL 4.5 4.2 4.4   BUN 6 - 24 mg/dL 19  17   Creat 0.76 - 1.27 mg/dL 0.88  0.90   Na 134 - 144 mmol/L 140  139   K 3.5 - 5.2 mmol/L 4.5  4.3   Cl 96 - 106 mmol/L 100  100   CO2 18 - 29 mmol/L 27  25   Glucose 65 - 99 mg/dL 83  100 (H)     Cancer Screening Latest Ref Rng & Units 2/12/2018 8/11/2017   AFP, Serum 0.0 - 8.0 ng/mL 1.2 1.1   AFP-L3% 0.0 - 9.9 % Comment Comment   Additional lab values drawn at today's office visit are pending at the time of documentation. SEROLOGIES:  Serologies Latest Ref Rng & Units 2/3/2017   Hep A Ab, Total Negative Positive (A)   Hep B Surface Ag Negative Negative   Hep B Core Ab, Total Negative Negative   Hep B Surface AB QL  Non Reactive   Hep C Ab 0.0 - 0.9 s/co ratio <0.1   MANJINDER, IFA  Negative   C-ANCA Neg:<1:20 titer <1:20   P-ANCA Neg:<1:20 titer <1:20   ANCA Neg:<1:20 titer <1:20   ASMCA 0 - 19 Units 19   M2 Ab 0.0 - 20.0 Units 14.7   Ceruloplasmin 16.0 - 31.0 mg/dL 4.9 (L)   2/2017. Rashawn disease genetic testing. Two variants in ATP7B gene are associated with Rashawn disease. This is consistent with Rashawn disease. LIVER HISTOLOGY:  2/2017. [facility]. EkPa was 21.8. IQR/med 28%. The results suggested a fibrosis level of F4.  4/2017. Slides reviewed by MLS. Sarcoid granulomas, RIVERA. 25-33% macro and micovesicular steatosis. Mild ballooning. Mild inflammation. Cirrhosis. 2/2019. [facility]. EkPa was 10.9. Suggested fibrosis level is F3. CAP score is 209, this is consistent with no increased steatosis. 6/2019. Liver biopsy done at the time of CC[facility]. Lobular granulomatous inflammation. Fibrosis: Score 3, numerous septae with occasional bridges. No steatosis. Mild portal inflammation. Very minimal hepatocellular iron. ENDOSCOPIC PROCEDURES:  9/2016. EGD by Dr Margarita Paz. No esophageal varices. Gastritis. 6/2018. Colonoscopy by Dr Margarita Paz.   Internal hemorrhoids, repeat in 10 years. 5/2019. EGD by Dr Kathy Siemens. Gastritis and no varices. RADIOLOGY:  2/2018. Ultrasound of liver. Normal appearing liver. No liver mass lesions. Immobile gallstone in the neck of the gallbladder. 8/2018. Ultrasound of liver. Normal appearing liver. No liver mass lesions. Immobile gallstone in the neck of the gallbladder, unchanged. 2/2019. Ultrasound of liver. Normal appearing liver. No liver mass lesions. Gallstones, no evidence of infection or inflammation. 8/2019. Ultrasound of liver. Heterogeneous liver without evidence for focal mass. No splenomegaly. No ascites. OTHER TESTING:  Not available or performed    ASSESSMENT AND PLAN:  Cirrhosis secondary to several etiologies which include treated Rashawn disease, RIVERA and sarcoidosis. The ALP is elevated secondary to hepatic sarcoidosis. There is no treatment for hepatic sarcoidosis. Steroids are ineffective except in reducing fevers, anorexia and tender hepatomegaly in severe inflammatory sarcoidosis which is not the case here. He is without symptoms of respiratory involvement. Serum copper and ceruloplasmin are low consistent with Rashawn disease. 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 The patient has been treated for Rashawn disease with Zinc for many years. Will continue this treatment as he is tolerating well and recent evaluation with urinary copper and serum copper levels have shown excellent response. Liver function is normal.  The platelet count is normal.      The patient was directed to continue all current medications at the current dosages. There are no contraindications for the patient to take any medications that are necessary for treatment of other medical issues. The patient was counseled regarding diet and exercise to achieve weight loss. He has had great success with this course and is at target weight. Will plan on maintenance. Patient understands rationale for monitoring of cirrhosis complications. He will need to have US of the liver every 6 months to rule out [address] I have also recommended that he have screening EGD every 2-3 years, this should be done again in 6/2021. The patient was counseled regarding alcohol consumption. He is a non-drinker. Vaccination for viral hepatitis A is not needed. The patient has serologic evidence of prior exposure or vaccination with immunity. All of the above issues were discussed with the patient. All questions were answered. The patient expressed a clear understanding of the above. [facility] 87 in 6 months with repeat US of the liver, alternating with local MD every other 6 months.     Seda Wang PA-C  Liver [place] of [address] [place] [address], [address] [address]  22.  [phone]

## 2019-08-13 NOTE — PROGRESS NOTES
.1. Have you been to the ER, urgent care clinic since your last visit? Hospitalized since your last visit?n    2. Have you seen or consulted any other health care providers outside of the [address] [place] [address] since your last visit? Include any pap smears or colon screening. No  .  Visit Vitals  /62 (BP 1 Location: Left leg, BP Patient Position: Sitting)   Temp 96 °F (35.6 °C) (Tympanic)   Wt 157 lb (71.2 kg)   SpO2 99%   BMI 21.29 kg/m²     .   Chief Complaint   Patient presents with    Follow-up

## 2019-08-13 NOTE — PROGRESS NOTES
Pt notified of stable findings, reviewed with patient at the time of office visit. Repeat in 6 months.

## 2019-08-14 LAB
AFP L3 MFR SERPL: NORMAL % (ref 0–9.9)
AFP SERPL-MCNC: 1.4 NG/ML (ref 0–8)
ALBUMIN SERPL-MCNC: 4.6 G/DL (ref 3.5–5.5)
ALP SERPL-CCNC: 144 IU/L (ref 39–117)
ALT SERPL-CCNC: 56 IU/L (ref 0–44)
AST SERPL-CCNC: 42 IU/L (ref 0–40)
BILIRUB DIRECT SERPL-MCNC: 0.18 MG/DL (ref 0–0.4)
BILIRUB SERPL-MCNC: 0.7 MG/DL (ref 0–1.2)
BUN SERPL-MCNC: 16 MG/DL (ref 6–24)
BUN/CREAT SERPL: 19 (ref 9–20)
CALCIUM SERPL-MCNC: 9.7 MG/DL (ref 8.7–10.2)
CHLORIDE SERPL-SCNC: 103 MMOL/L (ref 96–106)
CO2 SERPL-SCNC: 25 MMOL/L (ref 20–29)
CREAT SERPL-MCNC: 0.86 MG/DL (ref 0.76–1.27)
ERYTHROCYTE [DISTWIDTH] IN BLOOD BY AUTOMATED COUNT: 14 % (ref 12.3–15.4)
FERRITIN SERPL-MCNC: 332 NG/ML (ref 30–400)
GLUCOSE SERPL-MCNC: 90 MG/DL (ref 65–99)
HCT VFR BLD AUTO: 42 % (ref 37.5–51)
HGB BLD-MCNC: 14.3 G/DL (ref 13–17.7)
INR PPP: 1.1 (ref 0.8–1.2)
IRON SATN MFR SERPL: 31 % (ref 15–55)
IRON SERPL-MCNC: 85 UG/DL (ref 38–169)
MCH RBC QN AUTO: 31.4 PG (ref 26.6–33)
MCHC RBC AUTO-ENTMCNC: 34 G/DL (ref 31.5–35.7)
MCV RBC AUTO: 92 FL (ref 79–97)
PLATELET # BLD AUTO: 223 X10E3/UL (ref 150–450)
POTASSIUM SERPL-SCNC: 4.6 MMOL/L (ref 3.5–5.2)
PROTHROMBIN TIME: 11.7 SEC (ref 9.1–12)
RBC # BLD AUTO: 4.56 X10E6/UL (ref 4.14–5.8)
SODIUM SERPL-SCNC: 141 MMOL/L (ref 134–144)
TIBC SERPL-MCNC: 278 UG/DL (ref 250–450)
UIBC SERPL-MCNC: 193 UG/DL (ref 111–343)
WBC # BLD AUTO: 6.1 X10E3/UL (ref 3.4–10.8)

## 2020-02-13 ENCOUNTER — HOSPITAL ENCOUNTER (OUTPATIENT)
Dept: ULTRASOUND IMAGING | Age: 60
Discharge: HOME | End: 2020-02-13
Attending: PHYSICIAN ASSISTANT
Payer: COMMERCIAL

## 2020-02-13 ENCOUNTER — OFFICE VISIT (OUTPATIENT)
Dept: HEMATOLOGY | Age: 60
End: 2020-02-13

## 2020-02-13 VITALS
TEMPERATURE: 95.6 F | HEART RATE: 70 BPM | SYSTOLIC BLOOD PRESSURE: 116 MMHG | BODY MASS INDEX: 22.02 KG/M2 | WEIGHT: 162.6 LBS | HEIGHT: 72 IN | DIASTOLIC BLOOD PRESSURE: 71 MMHG | OXYGEN SATURATION: 99 %

## 2020-02-13 DIAGNOSIS — K74.60 CIRRHOSIS OF LIVER WITHOUT ASCITES, UNSPECIFIED HEPATIC CIRRHOSIS TYPE (HCC): ICD-10-CM

## 2020-02-13 DIAGNOSIS — E83.01 WILSON'S DISEASE: ICD-10-CM

## 2020-02-13 DIAGNOSIS — K74.60 CIRRHOSIS OF LIVER WITHOUT ASCITES, UNSPECIFIED HEPATIC CIRRHOSIS TYPE (HCC): Primary | ICD-10-CM

## 2020-02-13 PROCEDURE — 76700 US EXAM ABDOM COMPLETE: CPT

## 2020-02-13 NOTE — PROGRESS NOTES
Cordell Waters is a 63 y.o. male  Chief Complaint   Patient presents with    Follow-up       Visit Vitals  /71 (BP 1 Location: Left arm, BP Patient Position: Sitting)   Pulse 70   Temp 95.6 °F (35.3 °C) (Tympanic)   Ht 6' (1.829 m)   Wt 162 lb 9.6 oz (73.8 kg)   SpO2 99%   BMI 22.05 kg/m²         3 most recent PHQ Screens 2/13/2020   Little interest or pleasure in doing things Not at all   Feeling down, depressed, irritable, or hopeless Not at all   Total Score PHQ 2 0     Learning Assessment 2/13/2019   PRIMARY LEARNER Patient   BARRIERS PRIMARY LEARNER NONE   CO-LEARNER CAREGIVER No   PRIMARY LANGUAGE ENGLISH   LEARNER PREFERENCE PRIMARY LISTENING   ANSWERED BY patient   RELATIONSHIP SELF     Abuse Screening Questionnaire 8/13/2018   Do you ever feel afraid of your partner? N   Are you in a relationship with someone who physically or mentally threatens you? N   Is it safe for you to go home? Y         1. Have you been to the ER, urgent care clinic since your last visit? Hospitalized since your last visit? No    2. Have you seen or consulted any other health care providers outside of the [address] [place] [address] since your last visit? Include any pap smears or colon screening.  Yes , Myrtha Dandy, MD and Elijah Jernigan MD

## 2020-02-13 NOTE — PROGRESS NOTES
[address] \[facility]\"", Kathy AGUILERA Tillie Carbon, MD Zandra Glisson, CORAL Sumner, [facility]     Manda Erazo [facility]   KEVIN Boyer [facility]       Arabella Deputado Chirag [address]    at [address], [address] [place] [address]    [address] [facility] 22.    [phone]    FAX: [address] [place] [address]    at [address], [address]    [phone]    FAX: [phone]       Patient Care Team:  Anali Hassan MD as PCP - [facility])  Fletcher Lee MD (Gastroenterology)      Problem List  Date Reviewed: 2/13/2020          Codes Class Noted    Cholelithiasis ICD-10-CM: K80.20  ICD-9-CM: 574.20  2/20/2018        [facility] without asc[facility] ([facility] ICD-10-CM: K74.60  ICD-9-CM: 571.5  5/11/2017        RIVERA (nonalcoholic steatohepatitis) ICD-10-CM: K75.81  ICD-9-CM: 571.8  5/11/2017        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 The active problem list, all pertinent past medical history, medications, radiologic findings and laboratory findings related to the liver disorder were reviewed with the patient. The patient is a 63 y.o.  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 He was monitored at [facility]. He was initially treated with Penicillamine. He has been treated on maintenance Zinc for the past ~10 years.   He has continued to tolerate this medication well and has no new complaints at this time. His last 24 hour urine was in 8/2018. This was low. In 2016 he was found to have enlarged lymph nodes on chest XR and was found to have sarcoidosis, he is without other symptoms of sarcoidosis. He has continued to follow with pulmonary and has annual CT of the chest done, last 6/2019 and there were no new concerns. He has had no development of respiratory distress issues and is following with pulmonary once a year. Serologic evaluation for markers of chronic liver disease are positive for a low ceruloplasmin and low serum copper. The most recent imaging of the liver was Ultrasound was performed this morning and showed increased echotexture changes without mass/lesion. Past Fibroscan was 21.8 kPa which correlates with cirrhosis. This was repeated in 2/2019 with a score consistent with F3. The patient underwent a liver biopsy in 4/2017. This demonstrates features of RIVERA, sarcoid granulomas and cirrhosis. This was recently repeated at the time of CCY, showing bridging septa and granulomatous inflammation consistent with sarcoidosis. He has since had reduction in the frequency and intensity of right sided abdominal pain and has no surgical related complaints. The patient has no symptoms which could be attributed to the liver disorder. The patient completes all daily activities without any functional limitations. The patient has not experienced fatigue, swelling of the LE or abdomen, or memory issues. Of note, the patient has lost ~55+# over the course of the past 2 years, sustained, by following low-carbohydrate diet and increasing activity with walking >10,000 steps daily. The only other change to his medications was an increase of omeprazole following his last EGD 5/2019 that demonstrated gastritis. He has no ongoing symptoms of GI upset and has seen his local GI, Dr Jan Beaver in 11/2019.      ALLERGIES  No Known Allergies    MEDICATIONS  Current Outpatient Medications Medication Sig    OTHER Vitamin K2 MK-9 200    OTHER PC Liver and Brain Benefit    OTHER Collagen Marine Powder    OTHER Collagen Biocell with hyalauronic acid    inulin (FIBER GUMMIES PO) Take  by mouth every morning.  psyllium (METAMUCIL) powd Take  by mouth.  MULTIVITAMIN PO Take  by mouth. Takes one po once daily.  omeprazole (PRILOSEC) 40 mg capsule 1 (ONE) CAPSULE DR BY MOUTH DAILY    GALZIN 50 mg (zinc) cap Take 50 mg by mouth three (3) times daily.  OMEGA-3 FATTY ACIDS/FISH OIL (OMEGA 3 FISH OIL PO) Take 1 Cap by mouth daily. No current facility-administered medications for this visit. SYSTEM REVIEW NOT RELATED TO LIVER DISEASE OR REVIEWED ABOVE:  Constitution systems: Negative for fever, chills, weight gain. Continued, sustained weight loss. Eyes: Negative for visual changes. ENT: Negative for sore throat, painful swallowing. Respiratory: Negative for cough, hemoptysis, SOB. Cardiology: Negative for chest pain, palpitations. GI:  Negative for constipation or diarrhea. Occasional RUQ pain. : Negative for urinary frequency, dysuria, hematuria, nocturia. Skin: Negative for rash. Hematology: Negative for easy bruising, blood clots. Musculo-skeletal: Negative for back pain, muscle pain, weakness. Neurologic: Negative for headaches, dizziness, vertigo, memory problems not related to HE. Psychology: Negative for anxiety, depression. FAMILY HISTORY:  The father  of accident. The mother  of glioblastoma. A brother  of Rashawn diease. Several siblings have Rasahwn disease. SOCIAL HISTORY:  The patient is . The patient has no children. The patient has never used tobacco products. The patient consumes alcohol on rare social occasions never in excess. The patient currently works full time  and cow farmer.       PHYSICAL EXAMINATION:  /71 (BP 1 Location: Left arm, BP Patient Position: Sitting)   Pulse 70 Temp 95.6 °F (35.3 °C) (Tympanic)   Ht 6' (1.829 m)   Wt 162 lb 9.6 oz (73.8 kg)   SpO2 99%   BMI 22.05 kg/m²   General: No acute distress. Eyes: Sclera anicteric. ENT: No oral lesions. Thyroid normal.  Nodes: No adenopathy. Skin: No spider angiomata. No jaundice. No palmar erythema. Respiratory: Lungs clear to auscultation. Cardiovascular: Regular heart rate. No murmurs. No JVD. Abdomen: Soft non-tender. Liver size normal to percussion/palpation. Spleen not palpable. No obvious ascites. Extremities: No edema. No muscle wasting. No gross arthritic changes. Neurologic: Alert and oriented. Cranial nerves grossly intact. No asterixis.     LABORATORY STUDIES:  From 11/2019  AST/ALT/ALP/T Bili/ALB:34/41/151/0.6/4.5  WBC/HB/PLT/INR:6.0/14.3/239  BUN/CREAT:19/0.95  AFP 1.0  PSA 0.4  Ceruloplasmin 4.2    From 5/2019  AST/ALT/ALP/T Bili/ALB:48/61/114/0.5/4.8  WBC/HB/PLT/INR:5.3/14.7/213/X  BUN/CREAT:21/0.9  AFP 1.1    From 11/2018  AST/ALT/ALP/T Bili/ALB:53/70/138/0.5/4.8  BUN/CREAT:16/0.85  AFP is 1.2    Liver [place] of [address] Units 8/13/2018   WBC 3.4 - 10.8 x10E3/uL 5.8   ANC 1.4 - 7.0 x10E3/uL    HGB 13.0 - 17.7 g/dL 14.3    - 379 x10E3/uL 232   INR 0.8 - 1.2 1.2   AST 0 - 40 IU/L 45 (H)   ALT 0 - 44 IU/L 46 (H)   Alk Phos 39 - 117 IU/L 130 (H)   Bili, Total 0.0 - 1.2 mg/dL 0.7   Bili, Direct 0.00 - 0.40 mg/dL 0.18   Albumin 3.5 - 5.5 g/dL 4.7   BUN 6 - 24 mg/dL 17   Creat 0.76 - 1.27 mg/dL 0.89   Na 134 - 144 mmol/L 141   K 3.5 - 5.2 mmol/L 4.8   Cl 96 - 106 mmol/L 101   CO2 20 - 29 mmol/L 25   Glucose 65 - 99 mg/dL 88     From 5/11/2018  AST/ALT/ALP/T Bili/ALB: 39/54/148/0.5/4.8  WBC/HB/PLT/INR: 5.6/14.6/216  BUN/CREAT: 19/1.02    From 11/2017  AST/ALT/ALP/T Bili/ALB: 39/61/163/0.5/4.7  WBC/HB/PLT/INR:7.9/14.7/221  BUN/CREAT:19/0.86    Liver [place] of [address] & Units 8/11/2017 2/16/2017 2/3/2017   WBC 3.4 - 10.8 x10E3/uL 6.9 7.6 10.3   ANC 1.4 - 7.0 x10E3/uL 3.6 4.0 5.8   HGB 12.6 - 17.7 g/dL 14.7 14.9 15.1    - 379 x10E3/uL 239 299 261   INR 0.8 - 1.2  1.1    AST 0 - 40 IU/L 39 39 35   ALT 0 - 44 IU/L 47 (H) 42 44   Alk Phos 39 - 117 IU/L 160 (H) 178 (H) 207 (H)   Bili, Total 0.0 - 1.2 mg/dL 0.6 0.5 0.6   Bili, Direct 0.00 - 0.40 mg/dL 0.15 0.14 0.17   Albumin 3.5 - 5.5 g/dL 4.5 4.2 4.4   BUN 6 - 24 mg/dL 19  17   Creat 0.76 - 1.27 mg/dL 0.88  0.90   Na 134 - 144 mmol/L 140  139   K 3.5 - 5.2 mmol/L 4.5  4.3   Cl 96 - 106 mmol/L 100  100   CO2 18 - 29 mmol/L 27  25   Glucose 65 - 99 mg/dL 83  100 (H)     Cancer Screening Latest Ref Rng & Units 2/12/2018 8/11/2017   AFP, Serum 0.0 - 8.0 ng/mL 1.2 1.1   AFP-L3% 0.0 - 9.9 % Comment Comment   Additional lab values drawn at today's office visit are pending at the time of documentation. SEROLOGIES:  Serologies Latest Ref Rng & Units 2/3/2017   Hep A Ab, Total Negative Positive (A)   Hep B Surface Ag Negative Negative   Hep B Core Ab, Total Negative Negative   Hep B Surface AB QL  Non Reactive   Hep C Ab 0.0 - 0.9 s/co ratio <0.1   MANJINDER, IFA  Negative   C-ANCA Neg:<1:20 titer <1:20   P-ANCA Neg:<1:20 titer <1:20   ANCA Neg:<1:20 titer <1:20   ASMCA 0 - 19 Units 19   M2 Ab 0.0 - 20.0 Units 14.7   Ceruloplasmin 16.0 - 31.0 mg/dL 4.9 (L)   2/2017. Rashawn disease genetic testing. Two variants in ATP7B gene are associated with Rashawn disease. This is consistent with Rashawn disease. LIVER HISTOLOGY:  2/2017. FibroScan performed at [address]. EkPa was 21.8. IQR/med 28%. The results suggested a fibrosis level of F4.  4/2017. Slides reviewed by MLS. Sarcoid granulomas, RIVERA. 25-33% macro and micovesicular steatosis. Mild ballooning. Mild inflammation. Cirrhosis. 2/2019. FibroScan performed at [address]. EkPa was 10.9. Suggested fibrosis level is F3. CAP score is 209, this is consistent with no increased steatosis. 6/2019.   Liver biopsy done at the time of CCY.  Lobular granulomatous inflammation. Fibrosis: Score 3, numerous septae with occasional bridges. No steatosis. Mild portal inflammation. Very minimal hepatocellular iron. ENDOSCOPIC PROCEDURES:  9/2016. EGD by Dr Elmira Hitchcock. No esophageal varices. Gastritis. 6/2018. Colonoscopy by Dr Elmira Hitchcock. Internal hemorrhoids, repeat in 10 years. 5/2019. EGD by Dr Elmira Hitchcock. Gastritis and no varices. RADIOLOGY:  2/2018. Ultrasound of liver. Normal appearing liver. No liver mass lesions. Immobile gallstone in the neck of the gallbladder. 8/2018. Ultrasound of liver. Normal appearing liver. No liver mass lesions. Immobile gallstone in the neck of the gallbladder, unchanged. 2/2019. Ultrasound of liver. Normal appearing liver. No liver mass lesions. Gallstones, no evidence of infection or inflammation. 8/2019. Ultrasound of liver. Heterogeneous liver without evidence for focal mass. No splenomegaly. No ascites. 2/2020. Ultrasound of liver. Coarse echogenicity of the liver with possible nodular contour consistent with underlying hepatic parenchymal disease, possibly cirrhosis. Patent hepatic vasculature. Heterogeneous liver without evidence for focal mass. No splenomegaly. No ascites. OTHER TESTING:  Not available or performed    ASSESSMENT AND PLAN:  Cirrhosis secondary to several etiologies which include treated Rashawn disease, RIVERA and sarcoidosis. The ALP is elevated secondary to hepatic sarcoidosis. There is no treatment for hepatic sarcoidosis. Steroids are ineffective except in reducing fevers, anorexia and tender hepatomegaly in severe inflammatory sarcoidosis which is not the case here. He is without symptoms of respiratory involvement. Serum copper and ceruloplasmin are low consistent with Rashawn disease. Dolph Juan disease genetic testing is consistent with 2 mutations, both of which are associated with Rashawn disease.  The patient has been treated for Rashawn disease with Zinc for many years. Will continue this treatment as he is tolerating well and recent evaluation with urinary copper and serum copper levels have shown excellent response. Liver function is normal.  The platelet count is normal.      The patient was directed to continue all current medications at the current dosages. There are no contraindications for the patient to take any medications that are necessary for treatment of other medical issues. The patient was counseled regarding diet and exercise to achieve weight loss. He has had great success with this course and is at target weight. Will plan on maintenance. Patient understands rationale for monitoring of cirrhosis complications. He will need to have US of the liver every 6 months to rule out [address] I have also recommended that he have screening EGD every 2-3 years, this should be done again in 6/2021. The patient was counseled regarding alcohol consumption. He is a non-drinker. Vaccination for viral hepatitis A is not needed. The patient has serologic evidence of prior exposure or vaccination with immunity. All of the above issues were discussed with the patient. All questions were answered. The patient expressed a clear understanding of the above. [facility] 87 in 6 months with repeat US of the liver, alternating with local MD every other 6 months.     Bernabe Rivera PA-C  Liver [place] [facility] 59, [address] [facility] 22.  [phone]  [address] [place] [address]

## 2020-02-14 LAB
ALBUMIN SERPL-MCNC: 4.6 G/DL (ref 3.8–4.9)
ALP SERPL-CCNC: 146 IU/L (ref 39–117)
ALT SERPL-CCNC: 44 IU/L (ref 0–44)
AST SERPL-CCNC: 38 IU/L (ref 0–40)
BILIRUB DIRECT SERPL-MCNC: 0.18 MG/DL (ref 0–0.4)
BILIRUB SERPL-MCNC: 0.7 MG/DL (ref 0–1.2)
BUN SERPL-MCNC: 20 MG/DL (ref 6–24)
BUN/CREAT SERPL: 25 (ref 9–20)
CALCIUM SERPL-MCNC: 9.7 MG/DL (ref 8.7–10.2)
CHLORIDE SERPL-SCNC: 99 MMOL/L (ref 96–106)
CO2 SERPL-SCNC: 26 MMOL/L (ref 20–29)
CREAT SERPL-MCNC: 0.81 MG/DL (ref 0.76–1.27)
ERYTHROCYTE [DISTWIDTH] IN BLOOD BY AUTOMATED COUNT: 13.1 % (ref 11.6–15.4)
GLUCOSE SERPL-MCNC: 85 MG/DL (ref 65–99)
HCT VFR BLD AUTO: 43.3 % (ref 37.5–51)
HGB BLD-MCNC: 14.8 G/DL (ref 13–17.7)
INR PPP: 1.1 (ref 0.8–1.2)
MCH RBC QN AUTO: 31.6 PG (ref 26.6–33)
MCHC RBC AUTO-ENTMCNC: 34.2 G/DL (ref 31.5–35.7)
MCV RBC AUTO: 92 FL (ref 79–97)
PLATELET # BLD AUTO: 220 X10E3/UL (ref 150–450)
POTASSIUM SERPL-SCNC: 4.4 MMOL/L (ref 3.5–5.2)
PROTHROMBIN TIME: 11.9 SEC (ref 9.1–12)
RBC # BLD AUTO: 4.69 X10E6/UL (ref 4.14–5.8)
SODIUM SERPL-SCNC: 139 MMOL/L (ref 134–144)
WBC # BLD AUTO: 6.3 X10E3/UL (ref 3.4–10.8)

## 2020-02-16 LAB — COPPER SERPL-MCNC: 15 UG/DL (ref 72–166)

## 2020-02-18 LAB
AFP L3 MFR SERPL: NORMAL % (ref 0–9.9)
AFP SERPL-MCNC: 1.4 NG/ML (ref 0–8)

## 2020-02-18 NOTE — PROGRESS NOTES
Pt notified of stable function values, continue with zinc therapy and follow-up as scheduled in 6 months.

## 2020-08-27 ENCOUNTER — OFFICE VISIT (OUTPATIENT)
Dept: HEMATOLOGY | Age: 60
End: 2020-08-27
Payer: COMMERCIAL

## 2020-08-27 ENCOUNTER — HOSPITAL ENCOUNTER (OUTPATIENT)
Dept: ULTRASOUND IMAGING | Age: 60
Discharge: HOME | End: 2020-08-27
Attending: PHYSICIAN ASSISTANT
Payer: COMMERCIAL

## 2020-08-27 VITALS
DIASTOLIC BLOOD PRESSURE: 70 MMHG | OXYGEN SATURATION: 99 % | HEART RATE: 68 BPM | BODY MASS INDEX: 22 KG/M2 | WEIGHT: 162.4 LBS | HEIGHT: 72 IN | TEMPERATURE: 97 F | SYSTOLIC BLOOD PRESSURE: 121 MMHG | RESPIRATION RATE: 18 BRPM

## 2020-08-27 DIAGNOSIS — K74.60 CIRRHOSIS OF LIVER WITHOUT ASCITES, UNSPECIFIED HEPATIC CIRRHOSIS TYPE (HCC): Primary | ICD-10-CM

## 2020-08-27 DIAGNOSIS — K74.60 CIRRHOSIS OF LIVER WITHOUT ASCITES, UNSPECIFIED HEPATIC CIRRHOSIS TYPE (HCC): ICD-10-CM

## 2020-08-27 DIAGNOSIS — E83.01 WILSON'S DISEASE: ICD-10-CM

## 2020-08-27 LAB
ALBUMIN SERPL-MCNC: 4.2 G/DL (ref 3.5–5)
ALBUMIN/GLOB SERPL: 1.1 {RATIO} (ref 1.1–2.2)
ALP SERPL-CCNC: 134 U/L (ref 45–117)
ALT SERPL-CCNC: 56 U/L (ref 12–78)
ANION GAP SERPL CALC-SCNC: 4 MMOL/L (ref 5–15)
AST SERPL-CCNC: 33 U/L (ref 15–37)
BILIRUB DIRECT SERPL-MCNC: 0.2 MG/DL (ref 0–0.2)
BILIRUB SERPL-MCNC: 0.6 MG/DL (ref 0.2–1)
BUN SERPL-MCNC: 21 MG/DL (ref 6–20)
BUN/CREAT SERPL: 24 (ref 12–20)
CALCIUM SERPL-MCNC: 9.6 MG/DL (ref 8.5–10.1)
CHLORIDE SERPL-SCNC: 105 MMOL/L (ref 97–108)
CO2 SERPL-SCNC: 30 MMOL/L (ref 21–32)
CREAT SERPL-MCNC: 0.88 MG/DL (ref 0.7–1.3)
ERYTHROCYTE [DISTWIDTH] IN BLOOD BY AUTOMATED COUNT: 13.6 % (ref 11.5–14.5)
GLOBULIN SER CALC-MCNC: 4 G/DL (ref 2–4)
GLUCOSE SERPL-MCNC: 89 MG/DL (ref 65–100)
HCT VFR BLD AUTO: 43.3 % (ref 36.6–50.3)
HGB BLD-MCNC: 14.2 G/DL (ref 12.1–17)
INR PPP: 1.1 (ref 0.9–1.1)
MCH RBC QN AUTO: 31.4 PG (ref 26–34)
MCHC RBC AUTO-ENTMCNC: 32.8 G/DL (ref 30–36.5)
MCV RBC AUTO: 95.8 FL (ref 80–99)
NRBC # BLD: 0 K/UL (ref 0–0.01)
NRBC BLD-RTO: 0 PER 100 WBC
PLATELET # BLD AUTO: 202 K/UL (ref 150–400)
PMV BLD AUTO: 10.4 FL (ref 8.9–12.9)
POTASSIUM SERPL-SCNC: 4.3 MMOL/L (ref 3.5–5.1)
PROT SERPL-MCNC: 8.2 G/DL (ref 6.4–8.2)
PROTHROMBIN TIME: 11.4 SEC (ref 9–11.1)
RBC # BLD AUTO: 4.52 M/UL (ref 4.1–5.7)
SODIUM SERPL-SCNC: 139 MMOL/L (ref 136–145)
WBC # BLD AUTO: 5 K/UL (ref 4.1–11.1)

## 2020-08-27 PROCEDURE — 99214 OFFICE O/P EST MOD 30 MIN: CPT | Performed by: PHYSICIAN ASSISTANT

## 2020-08-27 PROCEDURE — 76700 US EXAM ABDOM COMPLETE: CPT

## 2020-08-27 NOTE — PROGRESS NOTES
[address] [facility], MD, Excell Scheuermann, [address] , MD [facility], CORAL escalera, [facility]     Manda SIMPSON Castro, [facility]   Corbinely Darian P-JONAS [facility]       Arabella oClvin [facility] 136    at [address], [address] Laure Burns  22.    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 The active problem list, all pertinent past medical history, medications, radiologic findings and laboratory findings related to the liver disorder were reviewed with the patient. The patient is a 62 y.o.  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 He was monitored at [facility]. He was initially treated with Penicillamine. He has been treated on maintenance Zinc for the past ~10 years.   He has continued to tolerate this medication well and has no new complaints at this time. His last 24 hour urine was in 8/2018. This was low. In 2016 he was found to have enlarged lymph nodes on chest XR and was found to have sarcoidosis. He has been largely without other symptoms of sarcoidosis but has noted some increase in exercise/stamina issues. He has continued to follow with pulmonary and has annual CT of the chest done, last 6/2019 and there were no new concerns. He is due for repeat and is planning on having this done in the near future. Serologic evaluation for markers of chronic liver disease are positive for a low ceruloplasmin and low serum copper. The most recent imaging of the liver was Ultrasound was performed this morning, the formal report is pending at the time of his office visit. The last from 2/2020 showed increased echotexture changes without mass/lesion. Past Fibroscan was 21.8 kPa which correlates with cirrhosis. This was repeated in 2/2019 with a score consistent with F3. The patient underwent a liver biopsy in 4/2017. This demonstrates features of RIVERA, sarcoid granulomas and cirrhosis. This was recently repeated at the time of CC[facility], showing bridging septa and granulomatous inflammation consistent with sarcoidosis. He has since had reduction in the frequency and intensity of right sided abdominal pain and has no surgical related complaints. The patient has no symptoms which could be attributed to the liver disorder. The patient completes all daily activities without any functional limitations. The patient has not experienced fatigue, swelling of the LE or abdomen, or memory issues. Of note, the patient has lost ~55+# over the course of the past 2+ years, sustained, by following low-carbohydrate diet and increasing activity with walking >10,000 steps daily. He has done well to maintain this weight loss. The only other change to his medications was an increase of omeprazole following his last EGD 5/2019 that demonstrated gastritis. He has no ongoing symptoms of GI upset and has seen his local GI, Dr Robert Michel in 2019. He continues with administration of zinc and is tolerating this well. He has noted some mild increased generalized itching on occasion. ALLERGIES  No Known Allergies    MEDICATIONS  Current Outpatient Medications   Medication Sig    OTHER Vitamin K2 MK-9 200    OTHER PC Liver and Brain Benefit    OTHER Collagen Marine Powder    OTHER Collagen Biocell with hyalauronic acid    inulin (FIBER GUMMIES PO) Take  by mouth every morning.  psyllium (METAMUCIL) powd Take  by mouth.  MULTIVITAMIN PO Take  by mouth. Takes one po once daily.  omeprazole (PRILOSEC) 40 mg capsule 1 (ONE) CAPSULE DR BY MOUTH DAILY    GALZIN 50 mg (zinc) cap Take 50 mg by mouth three (3) times daily.  OMEGA-3 FATTY ACIDS/FISH OIL (OMEGA 3 FISH OIL PO) Take 1 Cap by mouth daily. No current facility-administered medications for this visit. SYSTEM REVIEW NOT RELATED TO LIVER DISEASE OR REVIEWED ABOVE:  Constitution systems: Negative for fever, chills, weight gain. Continued, sustained weight loss. Eyes: Negative for visual changes. ENT: Negative for sore throat, painful swallowing. Respiratory: Negative for cough, hemoptysis, SOB. Cardiology: Negative for chest pain, palpitations. GI:  Negative for constipation or diarrhea. Occasional RUQ pain. : Negative for urinary frequency, dysuria, hematuria, nocturia. Skin: Negative for rash. Hematology: Negative for easy bruising, blood clots. Musculo-skeletal: Negative for back pain, muscle pain, weakness. Neurologic: Negative for headaches, dizziness, vertigo, memory problems not related to HE. Psychology: Negative for anxiety, depression. FAMILY HISTORY:  The father  of accident. The mother  of glioblastoma. A brother  of Rashawn diease. Several siblings have Rashawn disease. SOCIAL HISTORY:  The patient is .     The patient has no children. The patient has never used tobacco products. The patient consumes alcohol on rare social occasions never in excess. The patient currently works full time  and cow farmer. PHYSICAL EXAMINATION:  /70 (BP 1 Location: Left arm, BP Patient Position: Sitting)   Pulse 68   Temp 97 °F (36.1 °C) (Temporal)   Resp 18   Ht 6' (1.829 m)   Wt 162 lb 6.4 oz (73.7 kg)   SpO2 99%   BMI 22.03 kg/m²   General: No acute distress. Eyes: Sclera anicteric. ENT: No oral lesions. Thyroid normal.  Nodes: No adenopathy. Skin: No spider angiomata. No jaundice. No palmar erythema. Respiratory: Lungs clear to auscultation. Cardiovascular: Regular heart rate. No murmurs. No JVD. Abdomen: Soft non-tender. Liver size normal to percussion/palpation. Spleen not palpable. No obvious ascites. Extremities: No edema. No muscle wasting. No gross arthritic changes. Neurologic: Alert and oriented. Cranial nerves grossly intact. No asterixis.     LABORATORY STUDIES:  [facility] [place] of [address] [place] [address] 2/13/2020 8/13/2019   WBC 3.4 - 10.8 x10E3/uL 6.3 6.1   ANC 1.4 - 7.0 x10E3/uL     HGB 13.0 - 17.7 g/dL 14.8 14.3    - 450 x10E3/uL 220 223   INR 0.8 - 1.2 1.1 1.1   AST 0 - 40 IU/L 38 42 (H)   ALT 0 - 44 IU/L 44 56 (H)   Alk Phos 39 - 117 IU/L 146 (H) 144 (H)   Bili, Total 0.0 - 1.2 mg/dL 0.7 0.7   Bili, Direct 0.00 - 0.40 mg/dL 0.18 0.18   Albumin 3.8 - 4.9 g/dL 4.6 4.6   BUN 6 - 24 mg/dL 20 16   Creat 0.76 - 1.27 mg/dL 0.81 0.86   Na 134 - 144 mmol/L 139 141   K 3.5 - 5.2 mmol/L 4.4 4.6   Cl 96 - 106 mmol/L 99 103   CO2 20 - 29 mmol/L 26 25   Glucose 65 - 99 mg/dL 85 90     [facility] [place] of [address] [place] [address] Ref Rng & Units 2/13/2019   WBC 3.4 - 10.8 x10E3/uL 5.3   ANC 1.4 - 7.0 x10E3/uL    HGB 13.0 - 17.7 g/dL 13.9    - 450 x10E3/uL 231   INR 0.8 - 1.2 1.2   AST 0 - 40 IU/L 49 (H)   ALT 0 - 44 IU/L 56 (H)   Alk Phos 39 - 117 IU/L 122 (H)   Bili, Total 0.0 - 1.2 mg/dL 0.6   Bili, Direct 0.00 - 0.40 mg/dL 0.18   Albumin 3.8 - 4.9 g/dL 4.7   BUN 6 - 24 mg/dL 16   Creat 0.76 - 1.27 mg/dL 0.80   Na 134 - 144 mmol/L 140   K 3.5 - 5.2 mmol/L 4.3   Cl 96 - 106 mmol/L 100   CO2 20 - 29 mmol/L 24   Glucose 65 - 99 mg/dL 88     From 11/2019  AST/ALT/ALP/T Bili/ALB:34/41/151/0.6/4.5  WBC/HB/PLT/INR:6.0/14.3/239  BUN/CREAT:19/0.95  AFP 1.0  PSA 0.4  Ceruloplasmin 4.2    From 5/2019  AST/ALT/ALP/T Bili/ALB:48/61/114/0.5/4.8  WBC/HB/PLT/INR:5.3/14.7/213/X  BUN/CREAT:21/0.9  AFP 1.1  Cancer Screening Latest Ref Rng & Units 2/13/2020 8/13/2019 2/13/2019   AFP, Serum 0.0 - 8.0 ng/mL 1.4 1.4 1.1   AFP-L3% 0.0 - 9.9 % Comment Comment Comment   Additional lab values drawn at today's office visit are pending at the time of documentation. SEROLOGIES:  Serologies Latest Ref Rng & Units 2/3/2017   Hep A Ab, Total Negative Positive (A)   Hep B Surface Ag Negative Negative   Hep B Core Ab, Total Negative Negative   Hep B Surface AB QL  Non Reactive   Hep C Ab 0.0 - 0.9 s/co ratio <0.1   MANJINDER, IFA  Negative   C-ANCA Neg:<1:20 titer <1:20   P-ANCA Neg:<1:20 titer <1:20   ANCA Neg:<1:20 titer <1:20   ASMCA 0 - 19 Units 19   M2 Ab 0.0 - 20.0 Units 14.7   Ceruloplasmin 16.0 - 31.0 mg/dL 4.9 (L)   2/2017. Rashawn disease genetic testing. Two variants in ATP7B gene are associated with Rashawn disease. This is consistent with Rashawn disease. LIVER HISTOLOGY:  2/2017. Fib[facility]Scan performed at [facility]. EkPa was 21.8. IQR/med 28%. The results suggested a fibrosis level of F4.  4/2017. Slides reviewed by MLS. Sarcoid granulomas, RIVERA. 25-33% macro and micovesicular steatosis. Mild ballooning. Mild inflammation. Cirrhosis. 2/2019. FibroScan performed at [facility]. EkPa was 10.9. Suggested fibrosis level is F3. CAP score is 209, this is consistent with no increased steatosis. 6/2019. Liver biopsy done at the time of [facility].   Lobular granulomatous inflammation. Fibrosis: Score 3, numerous septae with occasional bridges. No steatosis. Mild portal inflammation. Very minimal hepatocellular iron. ENDOSCOPIC PROCEDURES:  9/2016. EGD by Dr Felciita Rolle. No esophageal varices. Gastritis. 6/2018. Colonoscopy by Dr Felicita Rolle. Internal hemorrhoids, repeat in 10 years. 5/2019. EGD by Dr Felicita Rolle. Gastritis and no varices. RADIOLOGY:  2/2018. Ultrasound of liver. Normal appearing liver. No liver mass lesions. Immobile gallstone in the neck of the gallbladder. 8/2018. Ultrasound of liver. Normal appearing liver. No liver mass lesions. Immobile gallstone in the neck of the gallbladder, unchanged. 2/2019. Ultrasound of liver. Normal appearing liver. No liver mass lesions. Gallstones, no evidence of infection or inflammation. 8/2019. Ultrasound of liver. Heterogeneous liver without evidence for focal mass. No splenomegaly. No ascites. 2/2020. Ultrasound of liver. Coarse echogenicity of the liver with possible nodular contour consistent with underlying hepatic parenchymal disease, possibly cirrhosis. Patent hepatic vasculature. Heterogeneous liver without evidence for focal mass. No splenomegaly. No ascites. 8/2020. Ultrasound of liver. Pending at the time of office visit. OTHER TESTING:  Not available or performed    ASSESSMENT AND PLAN:  Cirrhosis secondary to several etiologies which include treated Arshawn disease, RIVERA and sarcoidosis. The ALP is elevated secondary to hepatic sarcoidosis. There is no treatment for hepatic sarcoidosis. Steroids are ineffective except in reducing fevers, anorexia and tender hepatomegaly in severe inflammatory sarcoidosis which is not the case here. He has recently had increased shortness of breath with activity. He is due for annual assessment. Serum copper and ceruloplasmin are low consistent with Rashawn disease.  Nika Danyel disease genetic testing is consistent with 2 mutations, both of which are associated with Rashawn disease. The patient has been treated for Rashawn disease with Zinc for many years. Will continue this treatment as he is tolerating well and recent evaluation with urinary copper and serum copper levels have shown excellent response. Liver function is normal.  The platelet count is normal.      The patient was directed to continue all current medications at the current dosages. There are no contraindications for the patient to take any medications that are necessary for treatment of other medical issues. The patient was counseled regarding diet and exercise to achieve weight loss. He has had great success with this course and is at target weight. Will plan on maintenance. Patient understands rationale for monitoring of cirrhosis complications. He will need to have US of the liver every 6 months to rule out [address] I have also recommended that he have screening EGD every 3 years, this should be done again in 6/2022. The patient was counseled regarding alcohol consumption. He is a non-drinker. Vaccination for viral hepatitis A is not needed. The patient has serologic evidence of prior exposure or vaccination with immunity. All of the above issues were discussed with the patient. All questions were answered. The patient expressed a clear understanding of the above. [facility] 87 in 6 months with repeat US of the liver, alternating with local MD every other 6 months.     Yeny Mcclelland PA-C  Liver [place] 59, [facility] Laure Hoskins  22.  [phone]  [address]

## 2020-09-01 LAB
AFP L3 MFR SERPL: NORMAL % (ref 0–9.9)
AFP SERPL-MCNC: 1.3 NG/ML (ref 0–8)
COPPER SERPL-MCNC: 12 UG/DL (ref 72–166)

## 2020-09-01 NOTE — PROGRESS NOTES
Patient notified of stable lab and ultrasound findings, follow-up as scheduled in 6 months for repeat. Remain on present medications.

## 2021-03-02 ENCOUNTER — OFFICE VISIT (OUTPATIENT)
Dept: HEMATOLOGY | Age: 61
End: 2021-03-02
Payer: COMMERCIAL

## 2021-03-02 ENCOUNTER — HOSPITAL ENCOUNTER (OUTPATIENT)
Dept: ULTRASOUND IMAGING | Age: 61
Discharge: HOME | End: 2021-03-02
Attending: PHYSICIAN ASSISTANT
Payer: COMMERCIAL

## 2021-03-02 VITALS
WEIGHT: 155 LBS | SYSTOLIC BLOOD PRESSURE: 119 MMHG | OXYGEN SATURATION: 70 % | DIASTOLIC BLOOD PRESSURE: 68 MMHG | HEIGHT: 72 IN | RESPIRATION RATE: 18 BRPM | HEART RATE: 99 BPM | TEMPERATURE: 97.2 F | BODY MASS INDEX: 20.99 KG/M2

## 2021-03-02 DIAGNOSIS — E83.01 WILSON'S DISEASE: ICD-10-CM

## 2021-03-02 DIAGNOSIS — K74.60 CIRRHOSIS OF LIVER WITHOUT ASCITES, UNSPECIFIED HEPATIC CIRRHOSIS TYPE (HCC): ICD-10-CM

## 2021-03-02 DIAGNOSIS — K74.60 CIRRHOSIS OF LIVER WITHOUT ASCITES, UNSPECIFIED HEPATIC CIRRHOSIS TYPE (HCC): Primary | ICD-10-CM

## 2021-03-02 PROCEDURE — 99213 OFFICE O/P EST LOW 20 MIN: CPT | Performed by: PHYSICIAN ASSISTANT

## 2021-03-02 PROCEDURE — 76700 US EXAM ABDOM COMPLETE: CPT

## 2021-03-02 NOTE — PROGRESS NOTES
Identified pt with two pt identifiers(name and ). Reviewed record in preparation for visit and have obtained necessary documentation. No chief complaint on file. Vitals:    21 1231   BP: 119/68   Pulse: 99   Resp: 18   Temp: 97.2 °F (36.2 °C)   TempSrc: Temporal   SpO2: (!) 70%   Weight: 155 lb (70.3 kg)   Height: 6' (1.829 m)   PainSc:   0 - No pain       Health Maintenance Review: Patient reminded of \"due or due soon\" health maintenance. I have asked the patient to contact his/her primary care provider (PCP) for follow-up on his/her health maintenance. Coordination of Care Questionnaire:  :   1) Have you been to an emergency room, urgent care, or hospitalized since your last visit? If yes, where when, and reason for visit? no       2. Have seen or consulted any other health care provider since your last visit? If yes, where when, and reason for visit? NO      Patient is accompanied by self I have received verbal consent from Ted Triplett to discuss any/all medical information while they are present in the room.

## 2021-03-02 NOTE — PROGRESS NOTES
[address] [facility], MD, Delcia Nova, Reyes Blossom, MD Tanner Monks, CORAL Correia, [facility]     Manda Erazo, [facility]   OSCAR Galo-JONAS Heart, [facility]       Arabella Colvin Chirag [address]    at [address], [facility] Laure Burns  22.    [phone]    FAX: [address] [place] [address], [address]    [phone]    FAX: [phone]       Patient Care Team:  Lupie Epley, MD as PCP - General (Family Medicine)  Ramon Levy MD (Gastroenterology)      Problem List  Date Reviewed: 8/27/2020          Codes Class Noted    Cholelithiasis ICD-10-CM: K80.20  ICD-9-CM: 574.20  2/20/2018        Cirrhosis of liver without ascites ([facility] [address] ICD-10-CM: K74.60  ICD-9-CM: 571.5  5/11/2017        RIVERA (nonalcoholic steatohepatitis) ICD-10-CM: K75.81  ICD-9-CM: 571.8  5/11/2017        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 The active problem list, all pertinent past medical history, medications, radiologic findings and laboratory findings related to the liver disorder were reviewed with the patient. The patient is a 62 y.o.  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 He was monitored at [facility]. He was initially treated with Penicillamine. He has been treated on maintenance Zinc for the past ~10 years.   He has continued to tolerate this medication well and has no new complaints at this time. His last 24 hour urine was in 8/2018. This was low. In 2016 he was found to have enlarged lymph nodes on chest XR and was found to have sarcoidosis. He has been largely without other symptoms of sarcoidosis but has noted some increase in exercise/stamina issues. He has continued to follow with pulmonary and has annual CT of the chest done, last 9/2020 and there were no new concerns. Serologic evaluation for markers of chronic liver disease are positive for a low ceruloplasmin and low serum copper. The most recent imaging of the liver was Ultrasound was performed this morning, 3/2021. This shows coarse liver texture,  without mass/lesion or ascites. Past Fibroscan was 21.8 kPa which correlates with cirrhosis. This was repeated in 2/2019 with a score consistent with F3. The patient underwent a liver biopsy in 4/2017. This demonstrates features of RIVERA, sarcoid granulomas and cirrhosis. This was recently repeated at the time of CC[facility], showing bridging septa and granulomatous inflammation consistent with sarcoidosis. He has since had reduction in the frequency and intensity of right sided abdominal pain and has no surgical related complaints. The patient has no symptoms which could be attributed to the liver disorder. The patient completes all daily activities without any functional limitations. The patient has not experienced fatigue, swelling of the LE or abdomen, or memory issues. Of note, the patient has lost ~55+# over the course of the past 2+ years, sustained, by following low-carbohydrate diet and increasing activity with walking >10,000 steps daily. He has done well to maintain this weight loss. The only other change to his medications was an increase of omeprazole following his last EGD 5/2019 that demonstrated gastritis. He has no ongoing symptoms of GI upset and has seen his local GI, Dr Jazmyne Zamora in 11/2019.      He continues with administration of zinc and is tolerating this well. There has been ongoing manufacturing/supply problems with usual Galzin, he has been substituting another Zinc formulation Gluzin 50 mg.  2 tabs tid. Interested in pursuing with European variety of medication, Vicky Gonzalez. He has noted some mild increased generalized itching on occasion. Has cataract developed in right eye, no surgery as of yet, he is trying to defer if possible. Had cardiac stress testing and this apparently normal.   Was having some SOB at that time, this has not been progressive and he is without limitations at present. ALLERGIES  No Known Allergies    MEDICATIONS  Current Outpatient Medications   Medication Sig    OTHER Vitamin K2 MK-9 200    OTHER PC Liver and Brain Benefit    OTHER Collagen Marine Powder    OTHER Collagen Biocell with hyalauronic acid    inulin (FIBER GUMMIES PO) Take  by mouth every morning.  psyllium (METAMUCIL) powd Take  by mouth.  MULTIVITAMIN PO Take  by mouth. Takes one po once daily.  omeprazole (PRILOSEC) 40 mg capsule 1 (ONE) CAPSULE DR BY MOUTH DAILY    GALZIN 50 mg (zinc) cap Take 50 mg by mouth three (3) times daily.  OMEGA-3 FATTY ACIDS/FISH OIL (OMEGA 3 FISH OIL PO) Take 1 Cap by mouth daily. No current facility-administered medications for this visit. SYSTEM REVIEW NOT RELATED TO LIVER DISEASE OR REVIEWED ABOVE:  Constitution systems: Negative for fever, chills, weight gain. Continued, sustained weight loss. Eyes: Negative for visual changes. ENT: Negative for sore throat, painful swallowing. Respiratory: Negative for cough, hemoptysis, SOB. Cardiology: Negative for chest pain, palpitations. GI:  Negative for constipation or diarrhea. Occasional RUQ pain. : Negative for urinary frequency, dysuria, hematuria, nocturia. Skin: Negative for rash. Hematology: Negative for easy bruising, blood clots. Musculo-skeletal: Negative for back pain, muscle pain, weakness.   Neurologic: Negative for headaches, dizziness, vertigo, memory problems not related to HE. Psychology: Negative for anxiety, depression. FAMILY HISTORY:  The father  of accident. The mother  of glioblastoma. A brother  of Rashawn diease. Several siblings have Rashawn disease. SOCIAL HISTORY:  The patient is . The patient has no children. The patient has never used tobacco products. The patient consumes alcohol on rare social occasions never in excess. The patient currently works full time  and cow farmer. PHYSICAL EXAMINATION:  /68 (BP 1 Location: Right upper arm, BP Patient Position: Sitting, BP Cuff Size: Adult)   Pulse 99   Temp 97.2 °F (36.2 °C) (Temporal)   Resp 18   Ht 6' (1.829 m)   Wt 155 lb (70.3 kg)   SpO2 (!) 70%   BMI 21.02 kg/m²   General: No acute distress. Eyes: Sclera anicteric. ENT: No oral lesions. Thyroid normal.  Nodes: No adenopathy. Skin: No spider angiomata. No jaundice. No palmar erythema. Respiratory: Lungs clear to auscultation. Cardiovascular: Regular heart rate. No murmurs. No JVD. Abdomen: Soft non-tender. Liver size normal to percussion/palpation. Spleen not palpable. No obvious ascites. Extremities: No edema. No muscle wasting. No gross arthritic changes. Neurologic: Alert and oriented. Cranial nerves grossly intact. No asterixis.     LABORATORY STUDIES:  From 2020  AST/ALT/ALP/T Bili/ALB:37/43/129/0.7/4.4  WBC/HB/PLT/INR:6.0/14.6/217    L[facility] [place] of [address] [place] [address] Ref Rng & Units 2020   WBC 4.1 - 11.1 K/uL 5.0 6.3   ANC 1.4 - 7.0 x10E3/uL     HGB 12.1 - 17.0 g/dL 14.2 14.8    - 400 K/uL 202 220   INR 0.9 - 1.1   1.1 1.1   AST 15 - 37 U/L 33 38   ALT 12 - 78 U/L 56 44   Alk Phos 45 - 117 U/L 134 (H) 146 (H)   Bili, Total 0.2 - 1.0 MG/DL 0.6 0.7   Bili, Direct 0.0 - 0.2 MG/DL 0.2 0.18   Albumin 3.5 - 5.0 g/dL 4.2 4.6   BUN 6 - 20 MG/DL 21 (H) 20   Creat 0.70 - 1.30 MG/DL 0.88 0.81   Na 136 - 145 mmol/L 139 139   K 3.5 - 5.1 mmol/L 4.3 4.4   Cl 97 - 108 mmol/L 105 99   CO2 21 - 32 mmol/L 30 26   Glucose 65 - 100 mg/dL 89 85     Liver [place] of [address] [place] [address] Ref Rng & Units 8/13/2019   WBC 4.1 - 11.1 K/uL 6.1   ANC 1.4 - 7.0 x10E3/uL    HGB 12.1 - 17.0 g/dL 14.3    - 400 K/uL 223   INR 0.9 - 1.1   1.1   AST 15 - 37 U/L 42 (H)   ALT 12 - 78 U/L 56 (H)   Alk Phos 45 - 117 U/L 144 (H)   Bili, Total 0.2 - 1.0 MG/DL 0.7   Bili, Direct 0.0 - 0.2 MG/DL 0.18   Albumin 3.5 - 5.0 g/dL 4.6   BUN 6 - 20 MG/DL 16   Creat 0.70 - 1.30 MG/DL 0.86   Na 136 - 145 mmol/L 141   K 3.5 - 5.1 mmol/L 4.6   Cl 97 - 108 mmol/L 103   CO2 21 - 32 mmol/L 25   Glucose 65 - 100 mg/dL 90     From 11/2019  AST/ALT/ALP/T Bili/ALB:34/41/151/0.6/4.5  WBC/HB/PLT/INR:6.0/14.3/239  BUN/CREAT:19/0.95  AFP 1.0  PSA 0.4  Ceruloplasmin 4.2    From 5/2019  AST/ALT/ALP/T Bili/ALB:48/61/114/0.5/4.8  WBC/HB/PLT/INR:5.3/14.7/213/X  BUN/CREAT:21/0.9  AFP 1.1    Cancer Screening Latest Ref Rng & Units 8/27/2020 2/13/2020 8/13/2019   AFP, Serum 0.0 - 8.0 ng/mL 1.3 1.4 1.4   AFP-L3% 0.0 - 9.9 % Comment Comment Comment   Additional lab values drawn at today's office visit are pending at the time of documentation. SEROLOGIES:  Serologies Latest Ref Rng & Units 2/3/2017   Hep A Ab, Total Negative Positive (A)   Hep B Surface Ag Negative Negative   Hep B Core Ab, Total Negative Negative   Hep B Surface AB QL  Non Reactive   Hep C Ab 0.0 - 0.9 s/co ratio <0.1   MANJINDER, IFA  Negative   C-ANCA Neg:<1:20 titer <1:20   P-ANCA Neg:<1:20 titer <1:20   ANCA Neg:<1:20 titer <1:20   ASMCA 0 - 19 Units 19   M2 Ab 0.0 - 20.0 Units 14.7   Ceruloplasmin 16.0 - 31.0 mg/dL 4.9 (L)   2/2017. Rashawn disease genetic testing. Two variants in ATP7B gene are associated with Rashawn disease. This is consistent with Rashawn disease. LIVER HISTOLOGY:  2/2017. [facility]. EkPa was 21.8. IQR/med 28%.  The results suggested a fibrosis level of F4.  4/2017. Slides reviewed by MLS. Sarcoid granulomas, RIVERA. 25-33% macro and micovesicular steatosis. Mild ballooning. Mild inflammation. Cirrhosis. 2/2019. FibroScan performed at [address] EkPa was 10.9. Suggested fibrosis level is F3. CAP score is 209, this is consistent with no increased steatosis. 6/2019. Liver biopsy done at the time of [facility]. Lobular granulomatous inflammation. Fibrosis: Score 3, numerous septae with occasional bridges. No steatosis. Mild portal inflammation. Very minimal hepatocellular iron. ENDOSCOPIC PROCEDURES:  9/2016. EGD by Dr Colten Solano. No esophageal varices. Gastritis. 6/2018. Colonoscopy by Dr Colten Solano. Internal hemorrhoids, repeat in 10 years. 5/2019. EGD by Dr Colten Solano. Gastritis and no varices. RADIOLOGY:  2/2018. Ultrasound of liver. Normal appearing liver. No liver mass lesions. Immobile gallstone in the neck of the gallbladder. 8/2018. Ultrasound of liver. Normal appearing liver. No liver mass lesions. Immobile gallstone in the neck of the gallbladder, unchanged. 2/2019. Ultrasound of liver. Normal appearing liver. No liver mass lesions. Gallstones, no evidence of infection or inflammation. 8/2019. Ultrasound of liver. Heterogeneous liver without evidence for focal mass. No splenomegaly. No ascites. 2/2020. Ultrasound of liver. Coarse echogenicity of the liver with possible nodular contour consistent with underlying hepatic parenchymal disease, possibly cirrhosis. Patent hepatic vasculature. Heterogeneous liver without evidence for focal mass. No splenomegaly. No ascites. 8/2020. Ultrasound of liver. Stable diffusely coarsened liver echotexture suggesting hepatic parenchymal  disease. No focal liver mass. No ascites. Patent hepatic vasculature. 3/2021. Ultrasound of liver. Echogenic consistent with cirrhosis. No liver mass lesions. No dilated bile ducts. No ascites. OTHER TESTING:  Not available or performed    ASSESSMENT AND PLAN:  Cirrhosis secondary to several etiologies which include treated Rashawn disease, RIVERA and sarcoidosis. I have reviewed results of outside labs studies from 11/2020 in detail with the patient and these show stability over time. Will obtain additional values today. The ALP is elevated secondary to hepatic sarcoidosis. There is no treatment for hepatic sarcoidosis. Steroids are ineffective except in reducing fevers, anorexia and tender hepatomegaly in severe inflammatory sarcoidosis which is not the case here. He has recently had increased shortness of breath with activity. Annual CT of chest assessment in 9/2020 was unchanged. Serum copper and ceruloplasmin are low consistent with Rashawn disease. Stana Metuchen disease genetic testing is consistent with 2 mutations, both of which are associated with Rashawn disease. The patient has been treated for Rashawn disease with Zinc for many years. Will continue this treatment as he is tolerating well and recent evaluation with urinary copper and serum copper levels have shown excellent response. The patient was directed to continue all current medications at the current dosages. There are no contraindications for the patient to take any medications that are necessary for treatment of other medical issues. The patient was counseled regarding diet and exercise to achieve weight loss. He has had great success with this course and is at target weight. Will plan on maintenance. Patient understands rationale for monitoring of cirrhosis complications. He will need to have US of the liver every 6 months to rule out [address] this is now up to date. I have also recommended that he have screening EGD every 3 years, this should be done again in 6/2022. The patient was counseled regarding alcohol consumption. He is a non-drinker. Vaccination for viral hepatitis A is not needed.   The patient has serologic evidence of prior exposure or vaccination with immunity. All of the above issues were discussed with the patient. All questions were answered. The patient expressed a clear understanding of the above. [address] [address] in 6 months with repeat US of the liver, alternating with local MD every other 6 months. Will plan repeat Fibroscan of the liver at that time.      Karis Ames PA-C  Liver [place] [facility] 59, [address] [facility] 22.  [phone]  [address] [place] [address]

## 2021-03-03 LAB
AFP L3 MFR SERPL: NORMAL % (ref 0–9.9)
AFP SERPL-MCNC: 1.5 NG/ML (ref 0–8)
ALBUMIN SERPL-MCNC: 4.9 G/DL (ref 3.8–4.9)
ALP SERPL-CCNC: 138 IU/L (ref 39–117)
ALT SERPL-CCNC: 48 IU/L (ref 0–44)
AST SERPL-CCNC: 48 IU/L (ref 0–40)
BILIRUB DIRECT SERPL-MCNC: 0.16 MG/DL (ref 0–0.4)
BILIRUB SERPL-MCNC: 0.6 MG/DL (ref 0–1.2)
BUN SERPL-MCNC: 16 MG/DL (ref 6–24)
BUN/CREAT SERPL: 20 (ref 9–20)
CALCIUM SERPL-MCNC: 10 MG/DL (ref 8.7–10.2)
CHLORIDE SERPL-SCNC: 100 MMOL/L (ref 96–106)
CO2 SERPL-SCNC: 25 MMOL/L (ref 20–29)
CREAT SERPL-MCNC: 0.82 MG/DL (ref 0.76–1.27)
ERYTHROCYTE [DISTWIDTH] IN BLOOD BY AUTOMATED COUNT: 12.9 % (ref 11.6–15.4)
GLUCOSE SERPL-MCNC: 94 MG/DL (ref 65–99)
HCT VFR BLD AUTO: 45 % (ref 37.5–51)
HGB BLD-MCNC: 15 G/DL (ref 13–17.7)
INR PPP: 1.1 (ref 0.9–1.2)
MCH RBC QN AUTO: 31.4 PG (ref 26.6–33)
MCHC RBC AUTO-ENTMCNC: 33.3 G/DL (ref 31.5–35.7)
MCV RBC AUTO: 94 FL (ref 79–97)
PLATELET # BLD AUTO: 264 X10E3/UL (ref 150–450)
POTASSIUM SERPL-SCNC: 4.6 MMOL/L (ref 3.5–5.2)
PROT SERPL-MCNC: 8.1 G/DL (ref 6–8.5)
PROTHROMBIN TIME: 12 SEC (ref 9.1–12)
RBC # BLD AUTO: 4.78 X10E6/UL (ref 4.14–5.8)
SODIUM SERPL-SCNC: 139 MMOL/L (ref 134–144)
WBC # BLD AUTO: 4.5 X10E3/UL (ref 3.4–10.8)

## 2021-03-05 LAB — COPPER SERPL-MCNC: 16 UG/DL (ref 69–132)

## 2021-06-02 DIAGNOSIS — E83.01 WILSON'S DISEASE: ICD-10-CM

## 2021-06-02 DIAGNOSIS — K74.60 CIRRHOSIS OF LIVER WITHOUT ASCITES, UNSPECIFIED HEPATIC CIRRHOSIS TYPE (HCC): Primary | ICD-10-CM

## 2021-06-05 LAB
ALBUMIN SERPL-MCNC: 4.7 G/DL (ref 3.8–4.9)
ALP SERPL-CCNC: 143 IU/L (ref 48–121)
ALT SERPL-CCNC: 55 IU/L (ref 0–44)
AST SERPL-CCNC: 43 IU/L (ref 0–40)
BILIRUB DIRECT SERPL-MCNC: 0.13 MG/DL (ref 0–0.4)
BILIRUB SERPL-MCNC: 0.5 MG/DL (ref 0–1.2)
BUN SERPL-MCNC: 20 MG/DL (ref 6–24)
BUN/CREAT SERPL: 21 (ref 9–20)
CALCIUM SERPL-MCNC: 9.8 MG/DL (ref 8.7–10.2)
CHLORIDE SERPL-SCNC: 100 MMOL/L (ref 96–106)
CO2 SERPL-SCNC: 23 MMOL/L (ref 20–29)
CREAT SERPL-MCNC: 0.94 MG/DL (ref 0.76–1.27)
ERYTHROCYTE [DISTWIDTH] IN BLOOD BY AUTOMATED COUNT: 13.3 % (ref 11.6–15.4)
GLUCOSE SERPL-MCNC: 98 MG/DL (ref 65–99)
HCT VFR BLD AUTO: 40.8 % (ref 37.5–51)
HGB BLD-MCNC: 14.4 G/DL (ref 13–17.7)
INR PPP: 1.1 (ref 0.9–1.2)
MCH RBC QN AUTO: 32.7 PG (ref 26.6–33)
MCHC RBC AUTO-ENTMCNC: 35.3 G/DL (ref 31.5–35.7)
MCV RBC AUTO: 93 FL (ref 79–97)
PLATELET # BLD AUTO: 227 X10E3/UL (ref 150–450)
POTASSIUM SERPL-SCNC: 5.2 MMOL/L (ref 3.5–5.2)
PROT SERPL-MCNC: 8 G/DL (ref 6–8.5)
PROTHROMBIN TIME: 11.7 SEC (ref 9.1–12)
RBC # BLD AUTO: 4.4 X10E6/UL (ref 4.14–5.8)
SODIUM SERPL-SCNC: 141 MMOL/L (ref 134–144)
WBC # BLD AUTO: 5.6 X10E3/UL (ref 3.4–10.8)

## 2021-06-08 LAB — COPPER SERPL-MCNC: 14 UG/DL (ref 69–132)

## 2021-09-16 ENCOUNTER — OFFICE VISIT (OUTPATIENT)
Dept: HEMATOLOGY | Age: 61
End: 2021-09-16
Payer: COMMERCIAL

## 2021-09-16 ENCOUNTER — HOSPITAL ENCOUNTER (OUTPATIENT)
Dept: ULTRASOUND IMAGING | Age: 61
Discharge: HOME | End: 2021-09-16
Attending: PHYSICIAN ASSISTANT
Payer: COMMERCIAL

## 2021-09-16 VITALS
WEIGHT: 164 LBS | HEIGHT: 72 IN | BODY MASS INDEX: 22.21 KG/M2 | HEART RATE: 68 BPM | DIASTOLIC BLOOD PRESSURE: 66 MMHG | SYSTOLIC BLOOD PRESSURE: 108 MMHG | OXYGEN SATURATION: 100 %

## 2021-09-16 DIAGNOSIS — E83.01 WILSON'S DISEASE: ICD-10-CM

## 2021-09-16 DIAGNOSIS — K74.60 CIRRHOSIS OF LIVER WITHOUT ASCITES, UNSPECIFIED HEPATIC CIRRHOSIS TYPE (HCC): Primary | ICD-10-CM

## 2021-09-16 DIAGNOSIS — K74.60 CIRRHOSIS OF LIVER WITHOUT ASCITES, UNSPECIFIED HEPATIC CIRRHOSIS TYPE (HCC): ICD-10-CM

## 2021-09-16 PROCEDURE — 76700 US EXAM ABDOM COMPLETE: CPT

## 2021-09-16 PROCEDURE — 99214 OFFICE O/P EST MOD 30 MIN: CPT | Performed by: PHYSICIAN ASSISTANT

## 2021-09-16 PROCEDURE — 91200 LIVER ELASTOGRAPHY: CPT | Performed by: PHYSICIAN ASSISTANT

## 2021-09-16 RX ORDER — ZINC ACETATE 50 MG/1
1 CAPSULE ORAL 3 TIMES DAILY
Qty: 270 CAPSULE | Refills: 3 | Status: SHIPPED | OUTPATIENT
Start: 2021-09-16 | End: 2022-09-19 | Stop reason: SDUPTHER

## 2021-09-16 RX ORDER — ZINC ACETATE 50 MG/1
1 CAPSULE ORAL 3 TIMES DAILY
COMMUNITY
Start: 2021-08-09 | End: 2021-09-16 | Stop reason: ALTCHOICE

## 2021-09-16 RX ORDER — SAW PALMETTO 160 MG
320 CAPSULE ORAL
COMMUNITY

## 2021-09-16 NOTE — PROGRESS NOTES
Identified pt with two pt identifiers(name and ). Reviewed record in preparation for visit and have obtained necessary documentation. Chief Complaint   Patient presents with    Follow-up        Vitals:    21 1243   BP: 108/66   Pulse: 68   SpO2: 100%   Weight: 164 lb (74.4 kg)   Height: 6' (1.829 m)   PainSc:   0 - No pain       Health Maintenance Due   Topic    Colorectal Cancer Screening Combo     Shingrix Vaccine Age 50> (1 of 2)    DTaP/Tdap/Td series (1 - Tdap)    Flu Vaccine (1)       Coordination of Care Questionnaire:  :   1) Have you been to an emergency room, urgent care, or hospitalized since your last visit? No      2. Have seen or consulted any other health care provider since your last visit? If yes, where when, and reason for visit? No      Patient is accompanied by wife  I have received verbal consent from Francisco Davis to discuss any/all medical information while they are present in the room.

## 2021-09-16 NOTE — PROGRESS NOTES
Nancy Hudson MD, Luis Fernando Villarreal MD Olena Razor, CORAL Benson, [facility]     Manda SIMPSON Castro, [facility]   Buddy Mathias P-JONAS Patel, [facility]       Arabella Beard [address]    at [address], [address] [place] [address], [address].    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 The active problem list, all pertinent past medical history, medications, radiologic findings and laboratory findings related to the liver disorder were reviewed with the patient. The patient is a 62 y.o.  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 He was monitored at [facility]. He was initially treated with Penicillamine. He has been treated on maintenance Zinc for the past ~10 years.   He has continued to tolerate this medication well and has no new complaints at this time. In 2016 he was found to have enlarged lymph nodes on chest XR and was found to have sarcoidosis. He has been largely without other symptoms of sarcoidosis but has noted some increase in exercise/stamina issues. He has continued to follow with pulmonary and has annual CT of the chest done, last 9/2020 and there were no new concerns. He is due for repeat evaluation later this week. Serologic evaluation for markers of chronic liver disease are positive for a low ceruloplasmin and low serum copper. He has continued with his participation in the [facility] long-term WD study with Dr Germaine Casas and was seen three in 8/2021. No changes in stable labs or in dosing of zinc were recommended. The most recent imaging of the liver was Ultrasound was performed this morning, pending at the time of office visit. His last in 3/2021 showed coarse liver texture,  without mass/lesion or ascites. Past Fibroscan was 21.8 kPa which correlates with cirrhosis. This was repeated in 2/2019 with a score consistent with F3. We plan on repeating this value today. The patient underwent a liver biopsy in 4/2017. This demonstrates features of RIVERA, sarcoid granulomas and cirrhosis. This was recently repeated in 2019 at the time of CC[facility], showing bridging septa and granulomatous inflammation consistent with sarcoidosis. He has since had reduction in the frequency and intensity of right sided abdominal pain and has no surgical related complaints. The patient has no symptoms which could be attributed to the liver disorder. The patient completes all daily activities without any functional limitations. The patient has not experienced fatigue, swelling of the LE or abdomen, or memory issues. Of note, the patient has lost ~55+# over the course of the past ~3 years, sustained, by following low-carbohydrate diet and increasing activity with walking >10,000 steps daily. He has done well to maintain this weight loss. The only other change to his medications was an increase of omeprazole following his last EGD 5/2019 that demonstrated gastritis. He has no ongoing symptoms of GI upset and has seen his local GI, Dr Manuelito Cerna in 11/2019. He continues with administration of zinc and is tolerating this well. He is taking Galzin 50 mg tid. He has noted some mild increased generalized itching on occasion. Has cataract developed in right eye, no surgery as of yet, he is trying to defer if possible. He states that he has upcoming opth evaluation. ALLERGIES  No Known Allergies    MEDICATIONS  Current Outpatient Medications   Medication Sig    Galzin 50 mg (zinc) cap Take 1 Capsule by mouth three (3) times daily.  saw palmetto 160 mg cap Take 320 mg by mouth.  zinc acetate 25 mg (zinc) cap Take 50 mg by mouth three (3) times daily.  OTHER Vitamin K2 MK-9 200    OTHER PC Liver and Brain Benefit    OTHER Collagen Marine Powder    OTHER Collagen Biocell with hyalauronic acid    inulin (FIBER GUMMIES PO) Take  by mouth every morning.  psyllium (METAMUCIL) powd Take  by mouth.  MULTIVITAMIN PO Take  by mouth. Takes one po once daily.  omeprazole (PRILOSEC) 40 mg capsule 1 (ONE) CAPSULE DR BY MOUTH DAILY    OMEGA-3 FATTY ACIDS/FISH OIL (OMEGA 3 FISH OIL PO) Take 1 Cap by mouth daily. No current facility-administered medications for this visit. SYSTEM REVIEW NOT RELATED TO LIVER DISEASE OR REVIEWED ABOVE:  Constitution systems: Negative for fever, chills, weight gain. Continued, sustained weight loss. Eyes: Negative for visual changes. ENT: Negative for sore throat, painful swallowing. Respiratory: Negative for cough, hemoptysis, SOB. Cardiology: Negative for chest pain, palpitations. GI:  Negative for constipation or diarrhea. Occasional RUQ pain. : Negative for urinary frequency, dysuria, hematuria, nocturia. Skin: Negative for rash.   Hematology: Negative for easy bruising, blood clots.    Musculo-skeletal: Negative for back pain, muscle pain, weakness. Neurologic: Negative for headaches, dizziness, vertigo, memory problems not related to HE. Psychology: Negative for anxiety, depression. FAMILY HISTORY:  The father  of accident. The mother  of glioblastoma. A brother  of Rashawn diease. Several siblings have Rashawn disease. SOCIAL HISTORY:  The patient is . The patient has no children. The patient has never used tobacco products. The patient consumes alcohol on rare social occasions never in excess. The patient currently works full time  and cow farmer. PHYSICAL EXAMINATION:  /66 (BP 1 Location: Right upper arm, BP Patient Position: Sitting, BP Cuff Size: Adult)   Pulse 68   Ht 6' (1.829 m)   Wt 164 lb (74.4 kg)   SpO2 100%   BMI 22.24 kg/m²   General: No acute distress. Eyes: Sclera anicteric. ENT: No oral lesions. Thyroid normal.  Nodes: No adenopathy. Skin: No spider angiomata. No jaundice. No palmar erythema. Respiratory: Lungs clear to auscultation. Cardiovascular: Regular heart rate. No murmurs. No JVD. Abdomen: Soft non-tender. Liver size normal to percussion/palpation. Spleen not palpable. No obvious ascites. Extremities: No edema. No muscle wasting. No gross arthritic changes. Neurologic: Alert and oriented. Cranial nerves grossly intact. No asterixis.     LABORATORY STUDIES:  From 2021  AST/ALT/ALP/T Bili/ALB: 38/47/141/0.5/4.6  WBC/HB/PLT/INR: 6.1/13.7/209/1.1  NA/BUN/CREAT:140/15/0.78  GGT: 16  Ceruloplasmin 4   Zinc, urine 2768  Copper, Urine 8  Liver [place] of [address] Units 2021 3/2/2021 2020   WBC 3.4 - 10.8 x10E3/uL 5.6 4.5 5.0   ANC 1.4 - 7.0 x10E3/uL      HGB 13.0 - 17.7 g/dL 14.4 15.0 14.2    - 450 x10E3/uL 227 264 202   INR 0.9 - 1.2 1.1 1.1 1.1   AST 0 - 40 IU/L 43 (H) 48 (H) 33   ALT 0 - 44 IU/L 55 (H) 48 (H) 56   Alk Phos 48 - 121 IU/L 143 (H) 138 (H) 134 (H)   Bili, Total 0.0 - 1.2 mg/dL 0.5 0.6 0.6   Bili, Direct 0.00 - 0.40 mg/dL 0.13 0.16 0.2   Albumin 3.8 - 4.9 g/dL 4.7 4.9 4.2   BUN 6 - 24 mg/dL 20 16 21 (H)   Creat 0.76 - 1.27 mg/dL 0.94 0.82 0.88   Na 134 - 144 mmol/L 141 139 139   K 3.5 - 5.2 mmol/L 5.2 4.6 4.3   Cl 96 - 106 mmol/L 100 100 105   CO2 20 - 29 mmol/L 23 25 30   Glucose 65 - 99 mg/dL 98 94 89     From 11/2020  AST/ALT/ALP/T Bili/ALB:37/43/129/0.7/4.4  WBC/HB/PLT/INR:6.0/14.6/217    From 11/2019  AST/ALT/ALP/T Bili/ALB:34/41/151/0.6/4.5  WBC/HB/PLT/INR:6.0/14.3/239  BUN/CREAT:19/0.95  AFP 1.0  PSA 0.4  Ceruloplasmin 4.2    From 5/2019  AST/ALT/ALP/T Bili/ALB:48/61/114/0.5/4.8  WBC/HB/PLT/INR:5.3/14.7/213/X  BUN/CREAT:21/0.9  AFP 1.1    Cancer Screening Latest Ref Rng & Units 8/27/2020 2/13/2020 8/13/2019   AFP, Serum 0.0 - 8.0 ng/mL 1.3 1.4 1.4   AFP-L3% 0.0 - 9.9 % Comment Comment Comment       SEROLOGIES:  Serologies Latest Ref Rng & Units 2/3/2017   Hep A Ab, Total Negative Positive (A)   Hep B Surface Ag Negative Negative   Hep B Core Ab, Total Negative Negative   Hep B Surface AB QL  Non Reactive   Hep C Ab 0.0 - 0.9 s/co ratio <0.1   MANJINDER, IFA  Negative   C-ANCA Neg:<1:20 titer <1:20   P-ANCA Neg:<1:20 titer <1:20   ANCA Neg:<1:20 titer <1:20   ASMCA 0 - 19 Units 19   M2 Ab 0.0 - 20.0 Units 14.7   Ceruloplasmin 16.0 - 31.0 mg/dL 4.9 (L)   2/2017. Rashawn disease genetic testing. Two variants in ATP7B gene are associated with Rashawn disease. This is consistent with Rashawn disease. LIVER HISTOLOGY:  2/2017. FibroScan performed at [address]. EkPa was 21.8. IQR/med 28%. The results suggested a fibrosis level of F4.  4/2017. Slides reviewed by MLS. Sarcoid granulomas, RIVERA. 25-33% macro and micovesicular steatosis. Mild ballooning. Mild inflammation. Cirrhosis. 2/2019. FibroScan performed at [address]. EkPa was 10.9. Suggested fibrosis level is F3.   CAP score is 209, this is consistent with no increased steatosis. 6/2019. Liver biopsy done at the time of CCY. Lobular granulomatous inflammation. Fibrosis: Score 3, numerous septae with occasional bridges. No steatosis. Mild portal inflammation. Very minimal hepatocellular iron. 9/2021. FibroScan performed at [address]. EkPa was 7.6. Suggested fibrosis level is F2. CAP score is 177. ENDOSCOPIC PROCEDURES:  9/2016. EGD by Dr Judith Carver. No esophageal varices. Gastritis. 6/2018. Colonoscopy by Dr Judith Carver. Internal hemorrhoids, repeat in 10 years. 5/2019. EGD by Dr Judith Carver. Gastritis and no varices. RADIOLOGY:  2/2018. Ultrasound of liver. Normal appearing liver. No liver mass lesions. Immobile gallstone in the neck of the gallbladder. 8/2018. Ultrasound of liver. Normal appearing liver. No liver mass lesions. Immobile gallstone in the neck of the gallbladder, unchanged. 2/2019. Ultrasound of liver. Normal appearing liver. No liver mass lesions. Gallstones, no evidence of infection or inflammation. 8/2019. Ultrasound of liver. Heterogeneous liver without evidence for focal mass. No splenomegaly. No ascites. 2/2020. Ultrasound of liver. Coarse echogenicity of the liver with possible nodular contour consistent with underlying hepatic parenchymal disease, possibly cirrhosis. Patent hepatic vasculature. Heterogeneous liver without evidence for focal mass. No splenomegaly. No ascites. 8/2020. Ultrasound of liver. Stable diffusely coarsened liver echotexture suggesting hepatic parenchymal  disease. No focal liver mass. No ascites. Patent hepatic vasculature. 3/2021. Ultrasound of liver. Echogenic consistent with cirrhosis. No liver mass lesions. No dilated bile ducts. No ascites. 9/2021. Ultrasound of liver. Pending at the time of office visit.      OTHER TESTING:  Not available or performed    ASSESSMENT AND PLAN:  Cirrhosis secondary to several etiologies which include treated Rashawn disease, RIVERA and sarcoidosis. I have reviewed results of outside labs studies from 8/2021 in detail with the patient and these show stability over time. Will obtain additional values in 3 months locally, order given to patient. The ALP is elevated secondary to hepatic sarcoidosis. There is no treatment for hepatic sarcoidosis. Steroids are ineffective except in reducing fevers, anorexia and tender hepatomegaly in severe inflammatory sarcoidosis which is not the case here. He has recently had increased shortness of breath with activity. Annual CT of chest assessment in 9/2020 was unchanged. This will be repeated per pulmonary recommendations this week. Serum copper and ceruloplasmin are low consistent with Rashawn disease. Sari Marion disease genetic testing is consistent with 2 mutations, both of which are associated with Rashawn disease. The patient has been treated for Rashawn disease with Zinc for many years. Will continue this treatment as he is tolerating well and recent evaluation with urinary copper and serum copper levels have shown excellent response. The patient was directed to continue all current medications at the current dosages. There are no contraindications for the patient to take any medications that are necessary for treatment of other medical issues. The patient was counseled regarding diet and exercise to achieve weight loss. He has had great success with this course and is at target weight. Will plan on maintenance. Patient understands rationale for monitoring of cirrhosis complications. He will need to have US of the liver every 6 months to rule out [address] this is now up to date. Will follow-up on results as available. The patient was counseled regarding alcohol consumption. He is a non-drinker. Vaccination for viral hepatitis A is not needed.   The patient has serologic evidence of prior exposure or vaccination with immunity. All of the above issues were discussed with the patient. All questions were answered. The patient expressed a clear understanding of the above. [facility] 87 in 6 months with repeat US of the liver, alternating with local MD every other 6 months.     Nilsa Armijo PA-C  Liver [place] [facility], [facility] 22.  [phone]  [address] [place] [address]

## 2021-12-02 LAB
AFP L3 MFR SERPL: NORMAL % (ref 0–9.9)
AFP SERPL-MCNC: 1.5 NG/ML (ref 0–8)
ALBUMIN SERPL-MCNC: 4.5 G/DL (ref 3.8–4.9)
ALP SERPL-CCNC: 151 IU/L (ref 44–121)
ALT SERPL-CCNC: 57 IU/L (ref 0–44)
AST SERPL-CCNC: 49 IU/L (ref 0–40)
BILIRUB DIRECT SERPL-MCNC: 0.14 MG/DL (ref 0–0.4)
BILIRUB SERPL-MCNC: 0.5 MG/DL (ref 0–1.2)
BUN SERPL-MCNC: 21 MG/DL (ref 8–27)
BUN/CREAT SERPL: 23 (ref 10–24)
CALCIUM SERPL-MCNC: 9.5 MG/DL (ref 8.6–10.2)
CERULOPLASMIN SERPL-MCNC: 4.2 MG/DL (ref 16–31)
CHLORIDE SERPL-SCNC: 101 MMOL/L (ref 96–106)
CO2 SERPL-SCNC: 26 MMOL/L (ref 20–29)
CREAT SERPL-MCNC: 0.9 MG/DL (ref 0.76–1.27)
ERYTHROCYTE [DISTWIDTH] IN BLOOD BY AUTOMATED COUNT: 12.8 % (ref 11.6–15.4)
GLUCOSE SERPL-MCNC: 91 MG/DL (ref 65–99)
HCT VFR BLD AUTO: 41.7 % (ref 37.5–51)
HGB BLD-MCNC: 13.8 G/DL (ref 13–17.7)
INR PPP: 1.1 (ref 0.9–1.2)
MCH RBC QN AUTO: 30.6 PG (ref 26.6–33)
MCHC RBC AUTO-ENTMCNC: 33.1 G/DL (ref 31.5–35.7)
MCV RBC AUTO: 93 FL (ref 79–97)
PLATELET # BLD AUTO: 246 X10E3/UL (ref 150–450)
POTASSIUM SERPL-SCNC: 4.6 MMOL/L (ref 3.5–5.2)
PROT SERPL-MCNC: 7.6 G/DL (ref 6–8.5)
PROTHROMBIN TIME: 11.4 SEC (ref 9.1–12)
RBC # BLD AUTO: 4.51 X10E6/UL (ref 4.14–5.8)
SODIUM SERPL-SCNC: 138 MMOL/L (ref 134–144)
WBC # BLD AUTO: 5.8 X10E3/UL (ref 3.4–10.8)

## 2021-12-05 LAB — COPPER SERPL-MCNC: 14 UG/DL (ref 69–132)

## 2022-03-16 ENCOUNTER — HOSPITAL ENCOUNTER (OUTPATIENT)
Dept: ULTRASOUND IMAGING | Age: 62
Discharge: HOME | End: 2022-03-16
Attending: PHYSICIAN ASSISTANT
Payer: COMMERCIAL

## 2022-03-16 ENCOUNTER — OFFICE VISIT (OUTPATIENT)
Dept: HEMATOLOGY | Age: 62
End: 2022-03-16
Payer: COMMERCIAL

## 2022-03-16 VITALS
SYSTOLIC BLOOD PRESSURE: 111 MMHG | BODY MASS INDEX: 23.84 KG/M2 | DIASTOLIC BLOOD PRESSURE: 69 MMHG | HEART RATE: 66 BPM | TEMPERATURE: 97.3 F | WEIGHT: 176 LBS | HEIGHT: 72 IN

## 2022-03-16 DIAGNOSIS — K74.60 CIRRHOSIS OF LIVER WITHOUT ASCITES, UNSPECIFIED HEPATIC CIRRHOSIS TYPE (HCC): ICD-10-CM

## 2022-03-16 DIAGNOSIS — K74.60 CIRRHOSIS OF LIVER WITHOUT ASCITES, UNSPECIFIED HEPATIC CIRRHOSIS TYPE (HCC): Primary | ICD-10-CM

## 2022-03-16 PROCEDURE — 76700 US EXAM ABDOM COMPLETE: CPT

## 2022-03-16 PROCEDURE — 99214 OFFICE O/P EST MOD 30 MIN: CPT | Performed by: PHYSICIAN ASSISTANT

## 2022-03-16 NOTE — PROGRESS NOTES
[address] [facility], Jorgito AGUILERA, [facility], [place]      CORAL Rojas, [facility]     Manda Erazo, [facility]   Brittny Sheppard EMILEE-JONAS Gipson, [facility]       Arabella Colvin Chirag De Porter 136    at [address], [address] [place] [address] 22.    [phone]    FAX: [address] [place] [address], [address]    [phone]    FAX: [phone]       Patient Care Team:  Samuel Escobar NP as PCP - General (Nurse Practitioner)  Miguelangel Moreland MD (Gastroenterology)      Problem List  Date Reviewed: 9/16/2021          Codes Class Noted    Cholelithiasis ICD-10-CM: K80.20  ICD-9-CM: 574.20  2/20/2018        Cirrhosis of liver without ascites ([facility] ICD-10-CM: K74.60  ICD-9-CM: 571.5  5/11/2017        RIVERA (nonalcoholic steatohepatitis) ICD-10-CM: K75.81  ICD-9-CM: 571.8  5/11/2017        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 The active problem list, all pertinent past medical history, medications, radiologic findings and laboratory findings related to the liver disorder were reviewed with the patient. The patient is a 62 y.o.  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 He was monitored at [facility]. He was initially treated with Penicillamine. He has been treated on maintenance Zinc for the past ~10 years. He has continued to tolerate this medication well and has no new complaints at this time. In 2016 he was found to have enlarged lymph nodes on chest XR and was found to have sarcoidosis. He has been largely without other symptoms of sarcoidosis but has noted some increase in exercise/stamina issues. He has continued to follow with pulmonary and has annual CT of the chest done, last 9/2021 and there were no new concerns. Serologic evaluation for markers of chronic liver disease are positive for a low ceruloplasmin and low serum copper. He has continued with his participation in the [facility] long-term WD study with Dr Mary Collins and was seen there in 8/2021. No changes in stable labs or in dosing of zinc were recommended. He continues to tolerate Galzin well without GI side effects. The most recent imaging of the liver was Ultrasound was performed this morning, pending at the time of office visit. His last in 9/2021 showed coarse liver texture, without mass/lesion or ascites. Past Fibroscan was 21.8 kPa which correlates with cirrhosis. This was repeated in 2/2019 with a score consistent with F3 and again repeated in 9/2021 showing EkPa was 7.6. Suggested fibrosis level is F2. CAP score is 177. The patient underwent a liver biopsy in 4/2017. This demonstrates features of RIVERA, sarcoid granulomas and cirrhosis. This was recently repeated in 2019 at the time of [facility], showing bridging septa and granulomatous inflammation consistent with sarcoidosis. He has since had reduction in the frequency and intensity of right sided abdominal pain and has no surgical related complaints. The patient has no symptoms which could be attributed to the liver disorder. The patient completes all daily activities without any functional limitations. The patient has not experienced fatigue, swelling of the LE or abdomen, or memory issues. Of note, the patient has lost ~55+# over the course of the past ~3 years, largely sustained, but has had a ~20# weight gain in the past 12 months (12# in the past 6 ). He attributes the recent weight gain to winter and custodial - he has had less physical activity. He would like to try to maintain weight at ~170 or so. ALLERGIES  No Known Allergies    MEDICATIONS  Current Outpatient Medications   Medication Sig    saw palmetto 160 mg cap Take 320 mg by mouth.  Galzin 50 mg (zinc) cap Take 1 Capsule by mouth three (3) times daily.  OTHER Vitamin K2 MK-9 200    OTHER PC Liver and Brain Benefit    OTHER Collagen Marine Powder    OTHER Collagen Biocell with hyalauronic acid    inulin (FIBER GUMMIES PO) Take  by mouth every morning.  psyllium (METAMUCIL) powd Take  by mouth.  MULTIVITAMIN PO Take  by mouth. Takes one po once daily.  omeprazole (PRILOSEC) 40 mg capsule 1 (ONE) CAPSULE DR BY MOUTH DAILY    OMEGA-3 FATTY ACIDS/FISH OIL (OMEGA 3 FISH OIL PO) Take 1 Cap by mouth daily. No current facility-administered medications for this visit. SYSTEM REVIEW NOT RELATED TO LIVER DISEASE OR REVIEWED ABOVE:  Constitution systems: Negative for fever, chills. Modest weight gain - weight still appropriate. Eyes: Negative for visual changes. ENT: Negative for sore throat, painful swallowing. Respiratory: Negative for cough, hemoptysis, SOB. Cardiology: Negative for chest pain, palpitations. GI:  Negative for constipation or diarrhea. Occasional RUQ pain. : Negative for urinary frequency, dysuria, hematuria, nocturia. Skin: Negative for rash. Hematology: Negative for easy bruising, blood clots. Musculo-skeletal: Negative for back pain, muscle pain, weakness. Neurologic: Negative for headaches, dizziness, vertigo, memory problems not related to HE. Psychology: Negative for anxiety, depression. FAMILY HISTORY:  The father  of accident. The mother  of glioblastoma. A brother  of Rashawn diease. Several siblings have Rashawn disease. SOCIAL HISTORY:  The patient is . The patient has no children.      The patient has never used tobacco products. The patient consumes alcohol on rare social occasions never in excess. 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 PHYSICAL EXAMINATION:  /69 (BP 1 Location: Left upper arm, BP Patient Position: Sitting, BP Cuff Size: Adult)   Pulse 66   Temp 97.3 °F (36.3 °C) (Temporal)   Ht 6' (1.829 m)   Wt 176 lb (79.8 kg)   BMI 23.87 kg/m²   General: No acute distress. Eyes: Sclera anicteric. ENT: No oral lesions. Thyroid normal.  Nodes: No adenopathy. Skin: No spider angiomata. No jaundice. No palmar erythema. Respiratory: Lungs clear to auscultation. Cardiovascular: Regular heart rate. No murmurs. No JVD. Abdomen: Soft non-tender. Liver size normal to percussion/palpation. Spleen not palpable. No obvious ascites. Extremities: No edema. No muscle wasting. No gross arthritic changes. Neurologic: Alert and oriented. Cranial nerves grossly intact. No asterixis.     LABORATORY STUDIES:  From 12/2021  AST/ALT/ALP/T Bili/ALB:49/46/136/0.5/3.9  WBC/HB/PLT/INR: 7.8/14/267  NA/BUN/CREAT:136/16/0.8    Liver [place] of [address] Units 12/1/2021 6/4/2021 3/2/2021   WBC 3.4 - 10.8 x10E3/uL 5.8 5.6 4.5   HGB 13.0 - 17.7 g/dL 13.8 14.4 15.0    - 450 x10E3/uL 246 227 264   INR 0.9 - 1.2 1.1 1.1 1.1   AST 0 - 40 IU/L 49 (H) 43 (H) 48 (H)   ALT 0 - 44 IU/L 57 (H) 55 (H) 48 (H)   Alk Phos 44 - 121 IU/L 151 (H) 143 (H) 138 (H)   Bili, Total 0.0 - 1.2 mg/dL 0.5 0.5 0.6   Bili, Direct 0.00 - 0.40 mg/dL 0.14 0.13 0.16   Albumin 3.8 - 4.9 g/dL 4.5 4.7 4.9   BUN 8 - 27 mg/dL 21 20 16   Creat 0.76 - 1.27 mg/dL 0.90 0.94 0.82   Na 134 - 144 mmol/L 138 141 139   K 3.5 - 5.2 mmol/L 4.6 5.2 4.6   Cl 96 - 106 mmol/L 101 100 100   CO2 20 - 29 mmol/L 26 23 25   Glucose 65 - 99 mg/dL 91 98 94     From 8/2021  AST/ALT/ALP/T Bili/ALB: 38/47/141/0.5/4.6  WBC/HB/PLT/INR: 6.1/13.7/209/1.1  NA/BUN/CREAT:140/15/0.78  GGT: 16  Ceruloplasmin 4   Zinc, urine 2768  Copper, Urine 8  Liver [place] of [address] Units 6/4/2021 3/2/2021 8/27/2020   WBC 3.4 - 10.8 x10E3/uL 5.6 4.5 5.0   ANC 1.4 - 7.0 x10E3/uL      HGB 13.0 - 17.7 g/dL 14.4 15.0 14.2    - 450 x10E3/uL 227 264 202   INR 0.9 - 1.2 1.1 1.1 1.1   AST 0 - 40 IU/L 43 (H) 48 (H) 33   ALT 0 - 44 IU/L 55 (H) 48 (H) 56   Alk Phos 48 - 121 IU/L 143 (H) 138 (H) 134 (H)   Bili, Total 0.0 - 1.2 mg/dL 0.5 0.6 0.6   Bili, Direct 0.00 - 0.40 mg/dL 0.13 0.16 0.2   Albumin 3.8 - 4.9 g/dL 4.7 4.9 4.2   BUN 6 - 24 mg/dL 20 16 21 (H)   Creat 0.76 - 1.27 mg/dL 0.94 0.82 0.88   Na 134 - 144 mmol/L 141 139 139   K 3.5 - 5.2 mmol/L 5.2 4.6 4.3   Cl 96 - 106 mmol/L 100 100 105   CO2 20 - 29 mmol/L 23 25 30   Glucose 65 - 99 mg/dL 98 94 89     From 11/2020  AST/ALT/ALP/T Bili/ALB:37/43/129/0.7/4.4  WBC/HB/PLT/INR:6.0/14.6/217    From 11/2019  AST/ALT/ALP/T Bili/ALB:34/41/151/0.6/4.5  WBC/HB/PLT/INR:6.0/14.3/239  BUN/CREAT:19/0.95  AFP 1.0  PSA 0.4  Ceruloplasmin 4.2    From 5/2019  AST/ALT/ALP/T Bili/ALB:48/61/114/0.5/4.8  WBC/HB/PLT/INR:5.3/14.7/213/X  BUN/CREAT:21/0.9  AFP 1.1    Cancer Screening Latest Ref Rng & Units 12/1/2021 3/2/2021 8/27/2020   AFP, Serum 0.0 - 8.0 ng/mL 1.5 1.5 1.3   AFP-L3% 0.0 - 9.9 % Comment Comment Comment   Additional lab values drawn at today's office visit are pending at the time of documentation. SEROLOGIES:  Serologies Latest Ref Rng & Units 2/3/2017   Hep A Ab, Total Negative Positive (A)   Hep B Surface Ag Negative Negative   Hep B Core Ab, Total Negative Negative   Hep B Surface AB QL  Non Reactive   Hep C Ab 0.0 - 0.9 s/co ratio <0.1   MANJINDER, IFA  Negative   C-ANCA Neg:<1:20 titer <1:20   P-ANCA Neg:<1:20 titer <1:20   ANCA Neg:<1:20 titer <1:20   ASMCA 0 - 19 Units 19   M2 Ab 0.0 - 20.0 Units 14.7   Ceruloplasmin 16.0 - 31.0 mg/dL 4.9 (L)   2/2017. Rashawn disease genetic testing.   Two variants in ATP7B gene are associated with Janiya Amando disease. This is consistent with Rashawn disease. LIVER HISTOLOGY:  2/2017. FibroScan performed at [facility]. EkPa was 21.8. IQR/med 28%. The results suggested a fibrosis level of F4.  4/2017. Slides reviewed by MLS. Sarcoid granulomas, RIVERA. 25-33% macro and micovesicular steatosis. Mild ballooning. Mild inflammation. Cirrhosis. 2/2019. FibroScan performed at [facility]. EkPa was 10.9. Suggested fibrosis level is F3. CAP score is 209, this is consistent with no increased steatosis. 6/2019. Liver biopsy done at the time of [facility]. Lobular granulomatous inflammation. Fibrosis: Score 3, numerous septae with occasional bridges. No steatosis. Mild portal inflammation. Very minimal hepatocellular iron. 9/2021. FibroScan performed at [facility]. EkPa was 7.6. Suggested fibrosis level is F2. CAP score is 177. ENDOSCOPIC PROCEDURES:  9/2016. EGD by Dr Suasn Henry. No esophageal varices. Gastritis. 6/2018. Colonoscopy by Dr Susan Henry. Internal hemorrhoids, repeat in 10 years. 5/2019. EGD by Dr Susan Henry. Gastritis and no varices. RADIOLOGY:  2/2018. Ultrasound of liver. Normal appearing liver. No liver mass lesions. Immobile gallstone in the neck of the gallbladder. 8/2018. Ultrasound of liver. Normal appearing liver. No liver mass lesions. Immobile gallstone in the neck of the gallbladder, unchanged. 2/2019. Ultrasound of liver. Normal appearing liver. No liver mass lesions. Gallstones, no evidence of infection or inflammation. 8/2019. Ultrasound of liver. Heterogeneous liver without evidence for focal mass. No splenomegaly. No ascites. 2/2020. Ultrasound of liver. Coarse echogenicity of the liver with possible nodular contour consistent with underlying hepatic parenchymal disease, possibly cirrhosis. Patent hepatic vasculature. Heterogeneous liver without evidence for focal mass. No splenomegaly.  No ascites. 8/2020. Ultrasound of liver. Stable diffusely coarsened liver echotexture suggesting hepatic parenchymal  disease. No focal liver mass. No ascites. Patent hepatic vasculature. 3/2021. Ultrasound of liver. Echogenic consistent with cirrhosis. No liver mass lesions. No dilated bile ducts. No ascites. 9/2021. Ultrasound of liver. Echogenic consistent with cirrhosis. No liver mass lesions. No dilated bile ducts. No ascites  3/2022. Ultrasound of liver. Pending at the time of office visit. OTHER TESTING:  Not available or performed    ASSESSMENT AND PLAN:  Cirrhosis secondary to several etiologies which include treated Rashawn disease, RIVERA and sarcoidosis. The ALP is elevated secondary to hepatic sarcoidosis. Labs have otherwise remained stable and function is well-supported     There is no treatment for hepatic sarcoidosis. Steroids are ineffective except in reducing fevers, anorexia and tender hepatomegaly in severe inflammatory sarcoidosis which is not the case here. He has recently had increased shortness of breath with activity. Annual CT of chest assessment in 9/2021 was unchanged. This is being monitored annually. Serum copper and ceruloplasmin are low consistent with Rashawn disease. Jenniffer Abu disease genetic testing is consistent with 2 mutations, both of which are associated with Rashawn disease. The patient has been treated for Rashawn disease with Zinc for many years. Will continue this treatment as he is tolerating well and recent evaluation with urinary copper and serum copper levels have shown excellent response. The patient was directed to continue all current medications at the current dosages. There are no contraindications for the patient to take any medications that are necessary for treatment of other medical issues. The patient was counseled regarding diet and exercise to achieve weight loss. He has had great success with this course and is at target weight. Will plan on maintenance at weight of ~170#. Patient understands rationale for monitoring of cirrhosis complications. He will need to have US of the liver every 6 months to rule out [address] this is now up to date. Will follow-up on results as available and plan repeat in 6 months. The patient was counseled regarding alcohol consumption. He is a non-drinker. Vaccination for viral hepatitis A is not needed. The patient has serologic evidence of prior exposure or vaccination with immunity. All of the above issues were discussed with the patient. All questions were answered. The patient expressed a clear understanding of the above. [facility] 87 in 6 months with repeat US of the liver, alternating with local MD every other 6 months.     Garrison Kessler PA-C  Liver [place] [facility], [address] [facility] Laure Hoskins  22.  [phone]  [facility] [address]

## 2022-03-16 NOTE — PROGRESS NOTES
Identified pt with two pt identifiers(name and ). Reviewed record in preparation for visit and have obtained necessary documentation. Chief Complaint   Patient presents with    Cirrhosis Of Liver     6month f/u with Yoly Gardner      Vitals:    22 1128   BP: 111/69   Pulse: 66   Temp: 97.3 °F (36.3 °C)   TempSrc: Temporal   Weight: 176 lb (79.8 kg)   Height: 6' (1.829 m)   PainSc:   0 - No pain       Health Maintenance Review: Patient reminded of \"due or due soon\" health maintenance. I have asked the patient to contact his/her primary care provider (PCP) for follow-up on his/her health maintenance. Coordination of Care Questionnaire:  :   1) Have you been to an emergency room, urgent care, or hospitalized since your last visit? If yes, where when, and reason for visit? no       2. Have seen or consulted any other health care provider since your last visit? If yes, where when, and reason for visit? NO      Patient is accompanied by spouse I have received verbal consent from Yahir Hoyt to discuss any/all medical information while they are present in the room.

## 2022-03-17 LAB
AFP L3 MFR SERPL: NORMAL % (ref 0–9.9)
AFP SERPL-MCNC: 1.4 NG/ML (ref 0–8.4)
ALBUMIN SERPL-MCNC: 4.9 G/DL (ref 3.8–4.9)
ALP SERPL-CCNC: 142 IU/L (ref 44–121)
ALT SERPL-CCNC: 39 IU/L (ref 0–44)
AST SERPL-CCNC: 35 IU/L (ref 0–40)
BILIRUB DIRECT SERPL-MCNC: 0.18 MG/DL (ref 0–0.4)
BILIRUB SERPL-MCNC: 0.6 MG/DL (ref 0–1.2)
BUN SERPL-MCNC: 23 MG/DL (ref 8–27)
BUN/CREAT SERPL: 26 (ref 10–24)
CALCIUM SERPL-MCNC: 9.7 MG/DL (ref 8.6–10.2)
CERULOPLASMIN SERPL-MCNC: 4.6 MG/DL (ref 16–31)
CHLORIDE SERPL-SCNC: 102 MMOL/L (ref 96–106)
CO2 SERPL-SCNC: 23 MMOL/L (ref 20–29)
CREAT SERPL-MCNC: 0.9 MG/DL (ref 0.76–1.27)
EGFR: 98 ML/MIN/1.73
ERYTHROCYTE [DISTWIDTH] IN BLOOD BY AUTOMATED COUNT: 13.6 % (ref 11.6–15.4)
GLUCOSE SERPL-MCNC: 94 MG/DL (ref 65–99)
HCT VFR BLD AUTO: 43.7 % (ref 37.5–51)
HGB BLD-MCNC: 14.5 G/DL (ref 13–17.7)
INR PPP: 1.1 (ref 0.9–1.2)
MCH RBC QN AUTO: 31 PG (ref 26.6–33)
MCHC RBC AUTO-ENTMCNC: 33.2 G/DL (ref 31.5–35.7)
MCV RBC AUTO: 93 FL (ref 79–97)
PLATELET # BLD AUTO: 226 X10E3/UL (ref 150–450)
POTASSIUM SERPL-SCNC: 4.5 MMOL/L (ref 3.5–5.2)
PROT SERPL-MCNC: 7.9 G/DL (ref 6–8.5)
PROTHROMBIN TIME: 11.3 SEC (ref 9.1–12)
RBC # BLD AUTO: 4.68 X10E6/UL (ref 4.14–5.8)
SODIUM SERPL-SCNC: 141 MMOL/L (ref 134–144)
WBC # BLD AUTO: 6.7 X10E3/UL (ref 3.4–10.8)

## 2022-03-18 PROBLEM — G51.0 BELL'S PALSY: Status: ACTIVE | Noted: 2017-02-03

## 2022-03-18 PROBLEM — K75.81 NASH (NONALCOHOLIC STEATOHEPATITIS): Status: ACTIVE | Noted: 2017-05-11

## 2022-03-18 PROBLEM — D86.9 SARCOIDOSIS: Status: ACTIVE | Noted: 2017-02-03

## 2022-03-19 PROBLEM — K80.20 CHOLELITHIASIS: Status: ACTIVE | Noted: 2018-02-20

## 2022-03-20 PROBLEM — E83.01 WILSON'S DISEASE: Status: ACTIVE | Noted: 2017-02-03

## 2022-03-20 PROBLEM — K74.60 CIRRHOSIS OF LIVER WITHOUT ASCITES (HCC): Status: ACTIVE | Noted: 2017-05-11

## 2022-03-21 DIAGNOSIS — K74.60 CIRRHOSIS OF LIVER WITHOUT ASCITES, UNSPECIFIED HEPATIC CIRRHOSIS TYPE (HCC): Primary | ICD-10-CM

## 2022-03-21 NOTE — PROGRESS NOTES
Pt notified of stable findings, follow-up as planned in 6 months and remain on zinc chelation therapy. US report also stable.

## 2022-03-22 LAB — COPPER SERPL-MCNC: 18 UG/DL (ref 69–132)

## 2022-06-08 LAB
ERYTHROCYTE [DISTWIDTH] IN BLOOD BY AUTOMATED COUNT: 13.1 % (ref 11.6–15.4)
HCT VFR BLD AUTO: 42.4 % (ref 37.5–51)
HGB BLD-MCNC: 14.2 G/DL (ref 13–17.7)
MCH RBC QN AUTO: 31.1 PG (ref 26.6–33)
MCHC RBC AUTO-ENTMCNC: 33.5 G/DL (ref 31.5–35.7)
MCV RBC AUTO: 93 FL (ref 79–97)
PLATELET # BLD AUTO: 237 X10E3/UL (ref 150–450)
RBC # BLD AUTO: 4.57 X10E6/UL (ref 4.14–5.8)
WBC # BLD AUTO: 6.4 X10E3/UL (ref 3.4–10.8)

## 2022-06-09 LAB
AFP L3 MFR SERPL: NORMAL % (ref 0–9.9)
AFP SERPL-MCNC: 1.4 NG/ML (ref 0–8.4)
ALBUMIN SERPL-MCNC: 4.5 G/DL (ref 3.8–4.9)
ALP SERPL-CCNC: 143 IU/L (ref 44–121)
ALT SERPL-CCNC: 33 IU/L (ref 0–44)
AST SERPL-CCNC: 33 IU/L (ref 0–40)
BILIRUB DIRECT SERPL-MCNC: 0.15 MG/DL (ref 0–0.4)
BILIRUB SERPL-MCNC: 0.7 MG/DL (ref 0–1.2)
BUN SERPL-MCNC: 24 MG/DL (ref 8–27)
BUN/CREAT SERPL: 23 (ref 10–24)
CALCIUM SERPL-MCNC: 9.5 MG/DL (ref 8.6–10.2)
CERULOPLASMIN SERPL-MCNC: 3.6 MG/DL (ref 16–31)
CHLORIDE SERPL-SCNC: 103 MMOL/L (ref 96–106)
CO2 SERPL-SCNC: 23 MMOL/L (ref 20–29)
CREAT SERPL-MCNC: 1.05 MG/DL (ref 0.76–1.27)
EGFR: 81 ML/MIN/1.73
GLUCOSE SERPL-MCNC: 91 MG/DL (ref 65–99)
INR PPP: 1.1 (ref 0.9–1.2)
POTASSIUM SERPL-SCNC: 4.6 MMOL/L (ref 3.5–5.2)
PROT SERPL-MCNC: 7.6 G/DL (ref 6–8.5)
PROTHROMBIN TIME: 11.2 SEC (ref 9.1–12)
SODIUM SERPL-SCNC: 140 MMOL/L (ref 134–144)

## 2022-06-11 LAB — COPPER SERPL-MCNC: 29 UG/DL (ref 69–132)

## 2022-09-19 ENCOUNTER — TELEPHONE (OUTPATIENT)
Dept: HEMATOLOGY | Age: 62
End: 2022-09-19

## 2022-09-19 ENCOUNTER — OFFICE VISIT (OUTPATIENT)
Dept: HEMATOLOGY | Age: 62
End: 2022-09-19
Payer: COMMERCIAL

## 2022-09-19 ENCOUNTER — HOSPITAL ENCOUNTER (OUTPATIENT)
Dept: ULTRASOUND IMAGING | Age: 62
Discharge: HOME | End: 2022-09-19
Attending: PHYSICIAN ASSISTANT
Payer: COMMERCIAL

## 2022-09-19 VITALS
BODY MASS INDEX: 24.81 KG/M2 | HEART RATE: 68 BPM | OXYGEN SATURATION: 99 % | HEIGHT: 72 IN | SYSTOLIC BLOOD PRESSURE: 107 MMHG | DIASTOLIC BLOOD PRESSURE: 65 MMHG | WEIGHT: 183.2 LBS | TEMPERATURE: 97.3 F

## 2022-09-19 DIAGNOSIS — K74.60 CIRRHOSIS OF LIVER WITHOUT ASCITES, UNSPECIFIED HEPATIC CIRRHOSIS TYPE (HCC): ICD-10-CM

## 2022-09-19 PROCEDURE — 99214 OFFICE O/P EST MOD 30 MIN: CPT | Performed by: PHYSICIAN ASSISTANT

## 2022-09-19 PROCEDURE — 76700 US EXAM ABDOM COMPLETE: CPT

## 2022-09-19 RX ORDER — ZINC ACETATE 50 MG/1
1 CAPSULE ORAL 3 TIMES DAILY
Qty: 270 CAPSULE | Refills: 3 | Status: SHIPPED | OUTPATIENT
Start: 2022-09-19

## 2022-09-19 RX ORDER — PANTOPRAZOLE SODIUM 40 MG/1
40 TABLET, DELAYED RELEASE ORAL
COMMUNITY
Start: 2022-04-11

## 2022-09-19 NOTE — PROGRESS NOTES
Reviewed with patient at the time of office visit. No new concerning findings, follow-up in 6 months with repeat local labs in 3 months.

## 2022-09-19 NOTE — PROGRESS NOTES
[address] \[facility]", Jorgito AGUILERA, Brett Canelo [place], [place]      CORAL Ann, [facility]     Manda Erazo, [facility]   OSCAR Samuel-JONAS Gotti, [facility]       Arabella Beard [address]    at [address], [address] [place] [address], [facility] 22.    [phone]    FAX: [address] [place] [address]    at [address], [address]    [phone]    FAX: [phone]       Patient Care Team:  Kathie Jeffries MD as PCP - General (Family Medicine)  Lashanda Calvillo MD (Gastroenterology)      Problem List  Date Reviewed: 3/16/2022            Codes Class Noted    Cholelithiasis ICD-10-CM: K80.20  ICD-9-CM: 574.20  2/20/2018        Cirrhosis of liver without ascites ([facility] ICD-10-CM: K74.60  ICD-9-CM: 571.5  5/11/2017        RIVERA (nonalcoholic steatohepatitis) ICD-10-CM: K75.81  ICD-9-CM: 571.8  5/11/2017        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 The active problem list, all pertinent past medical history, medications, radiologic findings and laboratory findings related to the liver disorder were reviewed with the patient. The patient is a 65 y.o.  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 He was monitored at [facility]. He was initially treated with Penicillamine. He has been treated on maintenance Zinc for the past ~10-11 years. He has continued to tolerate this medication well and has no new complaints at this time. In 2016 he was found to have enlarged lymph nodes on chest XR and was found to have sarcoidosis. He has been largely without other symptoms of sarcoidosis but has noted some increase in exercise/stamina issues. He has continued to follow with pulmonary and has annual chest imaging and PFTs done, this is due next week, 9/2022 he has had no new symptoms. Serologic evaluation for markers of chronic liver disease are positive for a low ceruloplasmin and low serum copper. He has continued with his participation in the [facility] long-term WD study with Dr Grisel Hilton and was seen there again last week and has had repeat lab studies done. No changes in stable labs or in dosing of zinc were recommended. He continues to tolerate Galzin well without GI side effects. The most recent imaging of the liver was Ultrasound was performed this morning, this shows coarse liver texture, without mass/lesion or ascites. Past Fibroscan was 21.8 kPa which correlates with cirrhosis. This was repeated in 2/2019 with a score consistent with F3 and again repeated in 9/2021 showing EkPa was 7.6. Suggested fibrosis level is F2. CAP score is 177. The patient underwent a liver biopsy in 4/2017. This demonstrates features of RIVERA, sarcoid granulomas and cirrhosis. This was recently repeated in 2019 at the time of CCY, showing bridging septa and granulomatous inflammation consistent with sarcoidosis. He has since had reduction in the frequency and intensity of right sided abdominal pain and has no surgical related complaints. The patient has no symptoms which could be attributed to the liver disorder. The patient completes all daily activities without any functional limitations. The patient has not experienced fatigue, swelling of the LE or abdomen, or memory issues. Of note, the patient has lost ~55+# over the course of the past ~3 years, largely sustained, but has had a ~20# weight gain in the past 12 months.   He attributes the recent weight gain to winter and long term - he has had less physical activity. He would like to try to maintain weight at ~170-175# or so. ALLERGIES  No Known Allergies    MEDICATIONS  Current Outpatient Medications   Medication Sig    pantoprazole (PROTONIX) 40 mg tablet Take 40 mg by mouth. saw palmetto 160 mg cap Take 320 mg by mouth. Galzin 50 mg (zinc) cap Take 1 Capsule by mouth three (3) times daily. OTHER Vitamin K2 MK-9 200    OTHER PC Liver and Brain Benefit    OTHER Collagen Marine Powder    OTHER Collagen Biocell with hyalauronic acid    inulin (FIBER GUMMIES PO) Take  by mouth every morning. psyllium (METAMUCIL) powd Take  by mouth. MULTIVITAMIN PO Take  by mouth. Takes one po once daily. OMEGA-3 FATTY ACIDS/FISH OIL (OMEGA 3 FISH OIL PO) Take 1 Cap by mouth daily. omeprazole (PRILOSEC) 40 mg capsule 1 (ONE) CAPSULE DR BY MOUTH DAILY (Patient not taking: Reported on 2022)     No current facility-administered medications for this visit. SYSTEM REVIEW NOT RELATED TO LIVER DISEASE OR REVIEWED ABOVE:  Constitution systems: Negative for fever, chills. Modest weight gain - weight still appropriate. Eyes: Negative for visual changes. ENT: Negative for sore throat, painful swallowing. Respiratory: Negative for cough, hemoptysis, SOB. Cardiology: Negative for chest pain, palpitations. GI:  Negative for constipation or diarrhea. Occasional RUQ pain. : Negative for urinary frequency, dysuria, hematuria, nocturia. Skin: Negative for rash. Hematology: Negative for easy bruising, blood clots. Musculo-skeletal: Negative for back pain, muscle pain, weakness. Neurologic: Negative for headaches, dizziness, vertigo, memory problems not related to HE. Psychology: Negative for anxiety, depression. FAMILY HISTORY:  The father  of accident. The mother  of glioblastoma. A brother  of Rashawn diease.   Several siblings have Charanjit Block disease. SOCIAL HISTORY:  The patient is . The patient has no children. The patient has never used tobacco products. The patient consumes alcohol on rare social occasions never in excess. 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 PHYSICAL EXAMINATION:  /65 (BP 1 Location: Left upper arm, BP Patient Position: Sitting, BP Cuff Size: Adult)   Pulse 68   Temp 97.3 °F (36.3 °C) (Temporal)   Ht 6' (1.829 m)   Wt 183 lb 3.2 oz (83.1 kg)   SpO2 99%   BMI 24.85 kg/m²   General: No acute distress. Eyes: Sclera anicteric. ENT: No oral lesions. Thyroid normal.  Nodes: No adenopathy. Skin: No spider angiomata. No jaundice. No palmar erythema. Respiratory: Lungs clear to auscultation. Cardiovascular: Regular heart rate. No murmurs. No JVD. Abdomen: Soft non-tender. Liver size normal to percussion/palpation. Spleen not palpable. No obvious ascites. Extremities: No edema. No muscle wasting. No gross arthritic changes. Neurologic: Alert and oriented. Cranial nerves grossly intact. No asterixis.     LABORATORY STUDIES:  From 9/2022  AST/ALT/ALP/T Bili/ALB:31/46/139/0.2/4.6  WBC/HB/PLT/INR: 6.9/14.1/241/1.05  NA/BUN/CREAT:143/22/0.86  Ceruloplasmin 4 mg/dL  Copper, serum 10 mcg/dL  GGT: 13 U/L         Ferritin 184  TS 26%  Iron 75  Liver [place] of [address] Units 6/8/2022 3/16/2022 12/1/2021   WBC 3.4 - 10.8 x10E3/uL 6.4 6.7 5.8   HGB 13.0 - 17.7 g/dL 14.2 14.5 13.8    - 450 x10E3/uL 237 226 246   INR 0.9 - 1.2 1.1 1.1 1.1   AST 0 - 40 IU/L 33 35 49 (H)   ALT 0 - 44 IU/L 33 39 57 (H)   Alk Phos 44 - 121 IU/L 143 (H) 142 (H) 151 (H)   Bili, Total 0.0 - 1.2 mg/dL 0.7 0.6 0.5   Bili, Direct 0.00 - 0.40 mg/dL 0.15 0.18 0.14   Albumin 3.8 - 4.9 g/dL 4.5 4.9 4.5   BUN 8 - 27 mg/dL 24 23 21   Creat 0.76 - 1.27 mg/dL 1.05 0.90 0.90   Na 134 - 144 mmol/L 140 141 138   K 3.5 - 5.2 mmol/L 4.6 4.5 4.6 Cl 96 - 106 mmol/L 103 102 101   CO2 20 - 29 mmol/L 23 23 26   Glucose 65 - 99 mg/dL 91 94 91   From 12/2021  AST/ALT/ALP/T Bili/ALB:49/46/136/0.5/3.9  WBC/HB/PLT/INR: 7.8/14/267  NA/BUN/CREAT:136/16/0.8  Cancer Screening Latest Ref Rng & Units 6/8/2022 3/16/2022 12/1/2021   AFP, Serum 0.0 - 8.4 ng/mL 1.4 1.4 1.5   AFP-L3% 0.0 - 9.9 % Comment Comment Comment   Additional lab values drawn at today's office visit are pending at the time of documentation. SEROLOGIES:  Serologies Latest Ref Rng & Units 2/3/2017   Hep A Ab, Total Negative Positive (A)   Hep B Surface Ag Negative Negative   Hep B Core Ab, Total Negative Negative   Hep B Surface AB QL  Non Reactive   Hep C Ab 0.0 - 0.9 s/co ratio <0.1   MANJINDER, IFA  Negative   C-ANCA Neg:<1:20 titer <1:20   P-ANCA Neg:<1:20 titer <1:20   ANCA Neg:<1:20 titer <1:20   ASMCA 0 - 19 Units 19   M2 Ab 0.0 - 20.0 Units 14.7   Ceruloplasmin 16.0 - 31.0 mg/dL 4.9 (L)   2/2017. Rashawn disease genetic testing. Two variants in ATP7B gene are associated with Rashawn disease. This is consistent with Rashawn disease. 4/2022. [facility] for [facility] study. Heterozygous for two variants (autosomal recessive) for WD, this result is strongly suggestive of diagnosis WD.    LIVER HISTOLOGY:  2/2017. FibroScan performed at [facility]. EkPa was 21.8. IQR/med 28%. The results suggested a fibrosis level of F4.  4/2017. Slides reviewed by MLS. Sarcoid granulomas, RIVERA. 25-33% macro and micovesicular steatosis. Mild ballooning. Mild inflammation. Cirrhosis. 2/2019. [facility]. EkPa was 10.9. Suggested fibrosis level is F3. CAP score is 209, this is consistent with no increased steatosis. 6/2019. Liver biopsy done at the time of CCY. Lobular granulomatous inflammation. Fibrosis: Score 3, numerous septae with occasional bridges. No steatosis. Mild portal inflammation. Very minimal hepatocellular iron. 9/2021.   FibroScan performed at [facility]. EkPa was 7.6. Suggested fibrosis level is F2. CAP score is 177. ENDOSCOPIC PROCEDURES:  9/2016. EGD by Dr Diana Victor. No esophageal varices. Gastritis. 6/2018. Colonoscopy by Dr Diana Victor. Internal hemorrhoids, repeat in 10 years. 5/2019. EGD by Dr Diana Victor. Gastritis and no varices. 8/2022. EGD performed by Dr Deisi Mtz. Chronic GERD and gastritis, switched to pantoprazole. No ulcer and no H pylori. No varices. Esophageal stricture requiring dilation. RADIOLOGY:  2/2018. Ultrasound of liver. Normal appearing liver. No liver mass lesions. Immobile gallstone in the neck of the gallbladder. 8/2018. Ultrasound of liver. Normal appearing liver. No liver mass lesions. Immobile gallstone in the neck of the gallbladder, unchanged. 2/2019. Ultrasound of liver. Normal appearing liver. No liver mass lesions. Gallstones, no evidence of infection or inflammation. 8/2019. Ultrasound of liver. Heterogeneous liver without evidence for focal mass. No splenomegaly. No ascites. 2/2020. Ultrasound of liver. Coarse echogenicity of the liver with possible nodular contour consistent with underlying hepatic parenchymal disease, possibly cirrhosis. Patent hepatic vasculature. Heterogeneous liver without evidence for focal mass. No splenomegaly. No ascites. 8/2020. Ultrasound of liver. Stable diffusely coarsened liver echotexture suggesting hepatic parenchymal  disease. No focal liver mass. No ascites. Patent hepatic vasculature. 3/2021. Ultrasound of liver. Echogenic consistent with cirrhosis. No liver mass lesions. No dilated bile ducts. No ascites. 9/2021. Ultrasound of liver. Echogenic consistent with cirrhosis. No liver mass lesions. No dilated bile ducts. No ascites  3/2022. Ultrasound of liver. No evidence for abnormal liver mass or ascites. No splenomegaly. 9/2022. Ultrasound of liver.  No evidence for abnormal liver mass or ascites. No splenomegaly. OTHER TESTING:  Not available or performed    ASSESSMENT AND PLAN:  Cirrhosis secondary to several etiologies which include treated Rashawn disease, RIVERA and sarcoidosis. The ALP is elevated secondary to hepatic sarcoidosis. Labs have otherwise remained stable and function is well-supported     There is no treatment for hepatic sarcoidosis. Steroids are ineffective except in reducing fevers, anorexia and tender hepatomegaly in severe inflammatory sarcoidosis which is not the case here. He has recently had increased shortness of breath with activity. Annual CT of chest assessment and PFTs are pending for next week with pulmonary. Serum copper and ceruloplasmin are low consistent with Rashawn disease. Cloyce Guard disease genetic testing is consistent with 2 mutations, both of which are associated with Rashawn disease. The patient has been treated for Rashawn disease with Zinc for many years. Will continue this treatment as he is tolerating well and recent evaluation with urinary copper and serum copper levels have shown excellent response. Will follow-up on urinary copper lab testing pending at this time from annual [place] evaluation. The patient was directed to continue all current medications at the current dosages. There are no contraindications for the patient to take any medications that are necessary for treatment of other medical issues. The patient was counseled regarding diet and exercise to achieve weight loss. He has had great success with this course and is at target weight. Will plan on maintenance at weight of ~170#. Patient understands rationale for monitoring of cirrhosis complications. He will need to have US of the liver every 6 months to rule out [address] this is now up to date. Will plan repeat in 6 months in conjunction with next in-person evaluation.      He has had recent repeat EGD evaluation with no evidence of varices and has had no bleeding signs/symptoms. The patient was counseled regarding alcohol consumption. He is a non-drinker. Vaccination for viral hepatitis A is not needed. The patient has serologic evidence of prior exposure or vaccination with immunity. All of the above issues were discussed with the patient. All questions were answered. The patient expressed a clear understanding of the above. [facility] 87 in 6 months with repeat US of the liver, alternating with local MD every other 6 months. Lab order given for patient for 12/2022 for local recheck.      Deanna George PA-C  Liver [place] [facility] 59, [facility] Laure Hoskins  22.  [phone]  [address] [place] [address]

## 2022-09-19 NOTE — TELEPHONE ENCOUNTER
Referral and last office notes faxed to Dr. Ángel Millan requesting to reach out to patient for an appointment. Patient was provided a copy of the referral as well    Fax confirmation received.

## 2022-09-19 NOTE — PROGRESS NOTES
Identified pt with two pt identifiers(name and ). Reviewed record in preparation for visit and have obtained necessary documentation. Chief Complaint   Patient presents with    Fatty Liver    Cirrhosis Of Liver     6month follow up Jennifer Scale:    22 1036   BP: 107/65   Pulse: 68   Temp: 97.3 °F (36.3 °C)   TempSrc: Temporal   SpO2: 99%   Weight: 183 lb 3.2 oz (83.1 kg)   Height: 6' (1.829 m)   PainSc:   0 - No pain       Health Maintenance Review: Patient reminded of \"due or due soon\" health maintenance. I have asked the patient to contact his/her primary care provider (PCP) for follow-up on his/her health maintenance. Coordination of Care Questionnaire:  :   1) Have you been to an emergency room, urgent care, or hospitalized since your last visit? If yes, where when, and reason for visit? no       2. Have seen or consulted any other health care provider since your last visit? If yes, where when, and reason for visit? YES. GI      Patient is accompanied by wife I have received verbal consent from Jesus Rodriguez to discuss any/all medical information while they are present in the room.

## 2022-12-08 ENCOUNTER — OFFICE VISIT (OUTPATIENT)
Dept: ENT CLINIC | Age: 62
End: 2022-12-08
Payer: COMMERCIAL

## 2022-12-08 ENCOUNTER — OFFICE VISIT (OUTPATIENT)
Dept: ENT CLINIC | Age: 62
End: 2022-12-08

## 2022-12-08 VITALS
OXYGEN SATURATION: 99 % | RESPIRATION RATE: 17 BRPM | DIASTOLIC BLOOD PRESSURE: 78 MMHG | HEART RATE: 75 BPM | HEIGHT: 72 IN | SYSTOLIC BLOOD PRESSURE: 118 MMHG | WEIGHT: 183 LBS | BODY MASS INDEX: 24.79 KG/M2

## 2022-12-08 DIAGNOSIS — H90.3 SENSORINEURAL HEARING LOSS (SNHL) OF BOTH EARS: Primary | ICD-10-CM

## 2022-12-08 DIAGNOSIS — R26.89 IMBALANCE: ICD-10-CM

## 2022-12-08 DIAGNOSIS — D86.9 SARCOIDOSIS: ICD-10-CM

## 2022-12-08 DIAGNOSIS — H93.13 BILATERAL TINNITUS: Primary | ICD-10-CM

## 2022-12-08 DIAGNOSIS — E83.01 WILSON'S DISEASE: ICD-10-CM

## 2022-12-08 DIAGNOSIS — H93.13 BILATERAL TINNITUS: ICD-10-CM

## 2022-12-08 DIAGNOSIS — H90.3 ASYMMETRIC SNHL (SENSORINEURAL HEARING LOSS): ICD-10-CM

## 2022-12-08 PROCEDURE — 99204 OFFICE O/P NEW MOD 45 MIN: CPT | Performed by: OTOLARYNGOLOGY

## 2022-12-08 PROCEDURE — 92567 TYMPANOMETRY: CPT | Performed by: AUDIOLOGIST

## 2022-12-08 PROCEDURE — 92557 COMPREHENSIVE HEARING TEST: CPT | Performed by: AUDIOLOGIST

## 2022-12-08 RX ORDER — MECLIZINE HYDROCHLORIDE 25 MG/1
TABLET ORAL
COMMUNITY

## 2022-12-08 NOTE — PROGRESS NOTES
Orelia Sicard, a 65y.o. year old male, was seen in ENT clinic today for a hearing evaluation on referral from Dr. Saintclair Sia. Patient complains of tinnitus for the last year and balance issues for the last 6 months. He states that he feels unsteady while moving/walking. There is a history of noise exposure with use of hearing protection. Patient also states that past tympanic membrane perforations are likely due to [employer] work. \" He denies subjective hearing loss or ear pain. No recent audiogram.     Otoscopy: normal external ear canals and visible tympanic membranes, bilaterally. Tympanometry: RE Type A, normal  LE Type Ad, hypermobile    SRT: RE Speech Reception Threshold (SRT) was obtained at 10 dBHL LE Speech Reception Threshold (SRT) was obtained at 25 dBHL    WRS: RE Excellent in quiet when words were presented at 50 dBHL. WRS: LE Excellent in quiet when words were presented at 65 dBHL. Pure tone audiometry:  RE: WNL sloping to mild high-frequency likely sensorineural hearing loss  LE: Mild sloping to moderate-severe primarily sensorineural hearing loss (slight conductive component 500Hz)    Sensorineural hearing loss, bilaterally. Asymmetry noted (L>R)    Impressions:  hearing loss requiring medical/otologic and audiologic follow-up  Discussed results of today's testing with patient. Explained relationship between hearing loss and tinnitus. Patient is a candidate for amplification. Discussed benefits of hearing aids for amplification as well as potential masking for tinnitus. Recommended hearing aid evaluation appointment to discuss options. Patient indicated understanding. Provided tinnitus handout today. Plan:  Follow-up with ENT. Repeat audiogram 1 year or sooner if change is noted.    Hearing aid/tinnitus masker evaluation upon request.    Richard Dumas   Doctor of Audiology

## 2022-12-08 NOTE — PROGRESS NOTES
Otolaryngology-Head and Neck Surgery  New Patient Visit     Patient: Stephanie Robertson  YOB: 1960  MRN: [id number]  Date of Service: 12/8/2022    Chief Complaint:   Chief Complaint   Patient presents with    New Patient    Dizziness    Ringing in Ear         History of Present Illness: Stephanie Robertson is a 65y.o. year old male who presents today for discussion of balance concerns and tinnitus    Describes in the last 3-4 months has developed imbalance. Feels like when walking, falls over to one side    Denies room spinning    No falls    Comfortable when seated    Has also noted bilateral tinnitus during this time    Hearing seems to be ok  No prior ear surgeries  Mom with hearing loss in older age     Hx of Wilsons disease - dx many years ago, stable  Hx of sarcoid - dx 2016 - also stable      Past Medical History:  Past Medical History:   Diagnosis Date    Acid reflux disease     Bell's palsy     Cholelithiasis 2/20/2018    Gastritis     GERD (gastroesophageal reflux disease)     H/O chest x-ray     H/O CT scan of abdomen 12/16/2016    H/O CT scan of chest     History of MRI of brain and brain stem 10/27/2016    Sarcoidosis     Rashawn disease     Rashawn's disease        Past Surgical History:   Past Surgical History:   Procedure Laterality Date    HX COLONOSCOPY      HX ENDOSCOPY  09/06/2016    HX LAP CHOLECYSTECTOMY  06/20/2019    Laparoscopic cholecystectomy and laparoscopic liver biopsy by Dr. Annie Heller. HX OTHER SURGICAL      LIVER BIOPSY    HX OTHER SURGICAL      PULMONARY LYMPH NODE BX    NH CHEST SURGERY PROCEDURE UNLISTED         Medications:   Current Outpatient Medications   Medication Instructions    Galzin 50 mg (zinc) cap 1 Capsule, Oral, 3 TIMES DAILY    inulin (FIBER GUMMIES PO) Oral, 7AM    meclizine (ANTIVERT) 25 mg tablet Oral, 3 TIMES DAILY AS NEEDED    MULTIVITAMIN PO Oral, Takes one po once daily.      OMEGA-3 FATTY ACIDS/FISH OIL (OMEGA 3 FISH OIL PO) 1 Capsule, Oral, DAILY    omeprazole (PRILOSEC) 40 mg capsule 1 (ONE) CAPSULE DR BY MOUTH DAILY    OTHER Vitamin K2 MK-9 200     OTHER PC Liver and Brain Benefit     OTHER Collagen Marine Powder     OTHER Collagen Biocell with hyalauronic acid     pantoprazole (PROTONIX) 40 mg, Oral    psyllium (METAMUCIL) powd Oral    saw palmetto 320 mg, Oral       Allergies:   No Known Allergies    Social History:   Social History     Tobacco Use    Smoking status: Never    Smokeless tobacco: Never   Vaping Use    Vaping Use: Never used   Substance Use Topics    Alcohol use: No    Drug use: No        Family History:  Family History   Problem Relation Age of Onset    Cancer Mother         brain    No Known Problems Father     Liver Disease Sister     Elevated Lipids Brother     Liver Disease Sister     Liver Disease Sister     Liver Disease Brother     Elevated Lipids Brother     Anesth Problems Neg Hx        Review of Systems:    Consitutional: denies fever, excessive weight gain or loss. Eyes: denies diplopia, eye pain. Integumentary: denies new concerning skin lesions. Ears, Nose, Mouth, Throat: denies except as per HPI.   Endocrine: denies hot or cold intolerance, increased thirst.  Respiratory: denies cough, hemoptysis, wheezing  Gastrointestinal: denies trouble swallowing, nausea, emesis, regurgitation  Musculoskeletal: denies muscle weakness or wasting  Cardiovascular: denies chest pain, shortness of breath  Neurologic: denies seizures, numbness or tingling, syncope  Hematologic: denies easy bleeding or bruising    Physical Examination:   Vitals:    12/08/22 0913   BP: 118/78   BP 1 Location: Left upper arm   BP Patient Position: Sitting   BP Cuff Size: Adult   Pulse: 75   Resp: 17   Height: 6' (1.829 m)   Weight: 183 lb (83 kg)   SpO2: 99%        General: Comfortable, pleasant, appears stated age  Voice: Strong, speaking in full sentences, no stridor    Face: No masses or lesions, facial strength symmetric   Ears: External ears unremarkable. Bilateral ear canal clear. Tympanic membrane clear and intact, with visible landmarks. Clear middle ear space  Nose: External nose unremarkable. Dorsum midline. Anterior rhinoscopy demonstrates no lesions. Septum midline. Turbinates without hypertrophy. Oral Cavity / Oropharynx: No trismus. Mucosa pink and moist. No lesions. Tongue is midline and mobile. Palate elevates symmetrically. Uvula midline. Tonsils unremarkable. Base of tongue soft. Floor of mouth soft. Neck: Supple. No adenopathy. Thyroid unremarkable. Palpable laryngeal landmarks. Full neck range of motion   Neurologic: CN II - XI intact. No spontaneous or head shake nystagmus. Possible horizontal nystagmus with rightward gaze, vs physiologic. Decreased sensation V1,2 distribution, left          Assessment and Plan:   Imbalance  Asymmetric SNHL  Sarcoidosis  Rashawn's disease  - Given asymmetry in hearing and comorbidities, will check MRI brain/IAC   - They live outside of [place], so I will plan to call them with MRI result, and accordingly, discuss next steps  - Consider vestibular therapy, VNG if MRI normal            The patient was instructed to return to clinic if no improvement or progression of symptoms. Signs to watch out for reviewed.       MD Divina Burtonova [facility]  [address] [place] [address]  Office Phone: [phone]

## 2022-12-15 NOTE — PROGRESS NOTES
Pt notified via [facility] letter of stable lab findings, continue with Galzin 50 mg tid and rtc in 3/2023 with ultrasound .

## 2022-12-23 ENCOUNTER — HOSPITAL ENCOUNTER (OUTPATIENT)
Dept: MRI IMAGING | Age: 62
End: 2022-12-23
Attending: OTOLARYNGOLOGY
Payer: COMMERCIAL

## 2022-12-23 VITALS — WEIGHT: 198 LBS | BODY MASS INDEX: 26.85 KG/M2

## 2022-12-23 DIAGNOSIS — H90.3 ASYMMETRIC SNHL (SENSORINEURAL HEARING LOSS): ICD-10-CM

## 2022-12-23 DIAGNOSIS — R26.89 IMBALANCE: ICD-10-CM

## 2022-12-23 PROCEDURE — 70553 MRI BRAIN STEM W/O & W/DYE: CPT

## 2022-12-23 PROCEDURE — A9576 INJ PROHANCE MULTIPACK: HCPCS

## 2022-12-23 PROCEDURE — 74011250636 HC RX REV CODE- 250/636

## 2022-12-23 RX ADMIN — GADOTERIDOL 17 ML: 279.3 INJECTION, SOLUTION INTRAVENOUS at 12:13

## 2022-12-29 ENCOUNTER — TELEPHONE (OUTPATIENT)
Dept: ENT CLINIC | Age: 62
End: 2022-12-29

## 2022-12-29 DIAGNOSIS — H90.3 ASYMMETRIC SNHL (SENSORINEURAL HEARING LOSS): Primary | ICD-10-CM

## 2022-12-29 DIAGNOSIS — R26.89 IMBALANCE: ICD-10-CM

## 2022-12-29 NOTE — TELEPHONE ENCOUNTER
Called and spoke with patient re:MRI result    Will arrange balance therapy and VNG      Mark MD [facility]  [address] [place] [address]  Office Phone: [phone]

## 2023-01-09 ENCOUNTER — TELEPHONE (OUTPATIENT)
Dept: ENT CLINIC | Age: 63
End: 2023-01-09

## 2023-02-27 ENCOUNTER — OFFICE VISIT (OUTPATIENT)
Dept: ENT CLINIC | Age: 63
End: 2023-02-27
Payer: COMMERCIAL

## 2023-02-27 DIAGNOSIS — R26.89 IMBALANCE: Primary | ICD-10-CM

## 2023-02-27 PROCEDURE — 92540 BASIC VESTIBULAR EVALUATION: CPT | Performed by: AUDIOLOGIST

## 2023-02-27 PROCEDURE — 92537 CALORIC VSTBLR TEST W/REC: CPT | Performed by: AUDIOLOGIST

## 2023-02-27 NOTE — PROGRESS NOTES
Patient name: Reg Barron   : 1960   MRN: [id number]   Appointment type: VNG    Patient is a very pleasant 65 y.o. male  referred by Dr. Iram Philip for videonystagmography (VNG) testing. Patient reports vertigo described as unsteadiness that occurs with walking; he finds himself leaning toward the left side when walking also. Patient's most recent hearing test was 2022. Results at the time of testing indicated bilateral sensorineural hearing loss (L>R). There is a history of occupational noise exposre per patient [employer]ie [employer] work\"). At that time, he was also complaining of tinnitus. Patient has no reported history of head trauma and denies any ear pain or ear fullness/pressure. Patient denies history of stroke, seizure, falls, ear surgery, ear infection, tympanic membrane perforation, head trauma, glaucoma, neuropathy, or diabetes. Patient denied taking any vertigo-suppressing medications or consuming alcohol for at least 48 hours prior to testing. Otoscopy: canals clear, tympanic membrane intact bilaterally    VNG RESULTS -   Default calibration was used as patient had difficulty with smooth pursuit task needed for calibration, even following reinstruction.      Horizontal saccades  Average rightward peak velocity 354 deg/sec. within normal limits  Average rightward accuracy 82% within normal limits  Average rightward latency 155 ms within normal limits    Average leftward peak velocity -352 deg/sec within normal limits  Average leftward accuracy 77% within normal limits  Average leftward latency 149 ms within normal limits    Horizontal Tracking  Average rightward gain 0.68 within normal limits  Average leftward gain 0.47 abnormal findings    OPK  40 deg/sec to the right - Peak SPV 28 deg/sec abnormal findings   20 deg/sec to the right - Peak SPV 27 deg/sec within normal limits    40 deg/sec to the left - Peak SPV -19 deg/sec abnormal findings   20 deg/sec to the left - Peak SPV -26 deg/sec within normal limits    Gaze Testing  Center - Peak SPV 0 deg/sec within normal limits  Right - Peak SPV -1 deg/sec within normal limits  Left - Peak SPV 3 deg/sec within normal limits  Up - Peak SPV -1 deg/sec within normal limits  Down - Peak SPV -1 deg/sec within normal limits    Positional Testing (Vision Denied)  Sitting - Peak SPV 1 deg/sec within normal limits  Supine - Peak SPV 3 deg/sec within normal limits  Head Right - Peak SPV 1 deg/sec within normal limits  Head Left - Peak SPV 1 deg/sec within normal limits    Bithermal Calorics  Right Cool - Peak SPV 4 deg/sec abnormal findings   Right Warm - Peak SPV -8 deg/sec abnormal findings     Left Cool - Peak SPV -12 deg/sec within normal limits  Left Warm - Peak SPV 17 deg/sec within normal limits    TotR - 12 deg/sec  TotL - 20 deg/sec    Unilateral weakness - 41% in the Right  Baseline shift - 0 deg/sec  Gain Asymmetry - 2% to the Left    Note: right side calorics (cool and warm) were repeated three times following system rejection of recorded results, but repeated results were not accepted. Impressions: The following tests had an ABNORMAL result:  Horizontal tracking (leftward)  Optokinetics (40 deg/sec to the right and to the left)  Right calorics    Plan:   Refer to ENT.     Richard Sparks   Doctor of Audiology

## 2023-03-07 ENCOUNTER — TELEPHONE (OUTPATIENT)
Dept: ENT CLINIC | Age: 63
End: 2023-03-07

## 2023-03-09 ENCOUNTER — TELEPHONE (OUTPATIENT)
Dept: ENT CLINIC | Age: 63
End: 2023-03-09

## 2023-03-09 DIAGNOSIS — R26.89 IMBALANCE: Primary | ICD-10-CM

## 2023-03-09 NOTE — TELEPHONE ENCOUNTER
Pt stated he was returning Dr. Keira Hsu call from her voicemail and would like to schedule a time so that he can be near the phone when she calls, what time would work best for Dr. Rexford Saint?

## 2023-03-09 NOTE — TELEPHONE ENCOUNTER
Called and spoke with patient  VNG overall not reliable     He never heard re: balance PT but looks like we faxed referral back a few months ago    I will see whats the update there and will arrange referral to Dr Luis Felipe Lennon office due to continued imbalance

## 2023-03-22 ENCOUNTER — HOSPITAL ENCOUNTER (OUTPATIENT)
Dept: ULTRASOUND IMAGING | Age: 63
Discharge: HOME | End: 2023-03-22
Attending: PHYSICIAN ASSISTANT
Payer: COMMERCIAL

## 2023-03-22 ENCOUNTER — OFFICE VISIT (OUTPATIENT)
Dept: HEMATOLOGY | Age: 63
End: 2023-03-22
Payer: COMMERCIAL

## 2023-03-22 VITALS
DIASTOLIC BLOOD PRESSURE: 65 MMHG | OXYGEN SATURATION: 99 % | HEIGHT: 72 IN | SYSTOLIC BLOOD PRESSURE: 104 MMHG | WEIGHT: 181 LBS | BODY MASS INDEX: 24.52 KG/M2 | TEMPERATURE: 97.6 F | HEART RATE: 75 BPM

## 2023-03-22 DIAGNOSIS — K74.60 CIRRHOSIS OF LIVER WITHOUT ASCITES, UNSPECIFIED HEPATIC CIRRHOSIS TYPE (HCC): ICD-10-CM

## 2023-03-22 DIAGNOSIS — E83.01 WILSON'S DISEASE: ICD-10-CM

## 2023-03-22 DIAGNOSIS — K74.60 CIRRHOSIS OF LIVER WITHOUT ASCITES, UNSPECIFIED HEPATIC CIRRHOSIS TYPE (HCC): Primary | ICD-10-CM

## 2023-03-22 PROCEDURE — 76700 US EXAM ABDOM COMPLETE: CPT

## 2023-03-22 PROCEDURE — 99214 OFFICE O/P EST MOD 30 MIN: CPT | Performed by: PHYSICIAN ASSISTANT

## 2023-03-22 NOTE — PROGRESS NOTES
Identified pt with two pt identifiers(name and ). Reviewed record in preparation for visit and have obtained necessary documentation. Chief Complaint   Patient presents with    Cirrhosis Of Liver     6month follow up      Vitals:    23 1039   BP: 104/65   Pulse: 75   Temp: 97.6 °F (36.4 °C)   TempSrc: Temporal   SpO2: 99%   Weight: 181 lb (82.1 kg)   Height: 6' (1.829 m)   PainSc:   0 - No pain       Health Maintenance Review: Patient reminded of \"due or due soon\" health maintenance. I have asked the patient to contact his/her primary care provider (PCP) for follow-up on his/her health maintenance. Coordination of Care Questionnaire:  :   1) Have you been to an emergency room, urgent care, or hospitalized since your last visit? If yes, where when, and reason for visit? no       2. Have seen or consulted any other health care provider since your last visit? If yes, where when, and reason for visit? YES. Rehab and ENT      Patient is accompanied by spouse I have received verbal consent from Ira Valerio to discuss any/all medical information while they are present in the room. Bexarotene Counseling:  I discussed with the patient the risks of bexarotene including but not limited to hair loss, dry lips/skin/eyes, liver abnormalities, hyperlipidemia, pancreatitis, depression/suicidal ideation, photosensitivity, drug rash/allergic reactions, hypothyroidism, anemia, leukopenia, infection, cataracts, and teratogenicity.  Patient understands that they will need regular blood tests to check lipid profile, liver function tests, white blood cell count, thyroid function tests and pregnancy test if applicable.

## 2023-03-22 NOTE — PROGRESS NOTES
[address], Oli AGUILERA, Brett Canelo [place], [place]      CORAL Florez, [facility]   Bowenneelam Kelly, [facility]   Chery Lenz, FNP-JONAS Escamilla, FNP-C   Chanel Yeager, [facility]      [address]   at [address], [facility] La[address]  22.   [phone]   FAX: [phone]  Liver [facility]   at [address], [address] [place] [address], [address]   [phone]   FAX: [phone]     Patient Care Team:  Enoch Villanueva MD as PCP - General (Family Medicine)  Ophelia Saez MD (Gastroenterology)  Minda Smallwood MD (Gastroenterology)      Problem List  Date Reviewed: 12/8/2022            Codes Class Noted    Cholelithiasis ICD-10-CM: K80.20  ICD-9-CM: 574.20  2/20/2018        Cirrhosis of liver without ascites ([facility] [address] ICD-10-CM: K74.60  ICD-9-CM: 571.5  5/11/2017        RIVERA (nonalcoholic steatohepatitis) ICD-10-CM: K75.81  ICD-9-CM: 571.8  5/11/2017        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 The active problem list, all pertinent past medical history, medications, radiologic findings and laboratory findings related to the liver disorder were reviewed with the patient. The patient is a 65 y.o.  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 He was monitored at [facility]. He was initially treated with Penicillamine. He has been treated on maintenance Zinc for the past ~11-12 years.   He has continued to tolerate this medication well and has no new complaints at this time. In 2016 he was found to have enlarged lymph nodes on chest XR and was found to have sarcoidosis. He has been largely without other symptoms of sarcoidosis but has noted some increase in exercise/stamina issues. He has continued to follow with pulmonary and has annual chest imaging and PFTs done generally in September. He had COVID infection in 1/2023 with a prolonged course and has noted some lingering shortness of breath. He is thinking that he may contact pulmonary for sooner appointment. Serologic evaluation for markers of chronic liver disease are positive for a low ceruloplasmin and low serum copper. He has continued with his participation in the [facility] long-term WD study with Dr Zhou Richmond and was seen for his annual appointment in 9/2022. No changes in stable labs or in dosing of zinc were recommended. He continues to tolerate Galzin well without GI side effects. The most recent imaging of the liver was ultrasound was performed this morning, this study is pending at the time of office visit. His last in 9/2022 shows coarse liver texture, without mass/lesion or ascites. Past Fibroscan was 21.8 kPa which correlates with cirrhosis. This was repeated in 2/2019 with a score consistent with F3 and again repeated in 9/2021 showing EkPa was 7.6. Suggested fibrosis level is F2. CAP score is 177. The patient underwent a liver biopsy in 4/2017. This demonstrates features of RIVERA, sarcoid granulomas and cirrhosis. This was recently repeated in 2019 at the time of CCY, showing bridging septa and granulomatous inflammation consistent with sarcoidosis. He has since had reduction in the frequency and intensity of right sided abdominal pain and has no surgical related complaints. The patient has no symptoms which could be attributed to the liver disorder. The patient completes all daily activities without any functional limitations.   The patient has not experienced fatigue, swelling of the LE or abdomen, or memory issues. Of note, the patient has lost ~55+# over the course of the past ~3 years, largely sustained. He would like to try to maintain weight at ~170-175# or so. Patient has had issues in the past several years with balance and loss of hearing in the right ear. Evaluation with neuro in 12/2022 brain MRI normal. Seeing balance specialist, inconclusive balance testing and is due to see Dr Abimael Wiggins in 4/2023. This presents with a sense of falling with movement. He is also due to see Dr Maggie Samuel in 4/2023 for hearing loss and/or balance and cognitive concerns, ? Meniere's. He has noted that he has had some cognitive decline, need for repetition. Memory/recall has been fair. He is fairly regular with regard to bowel output, daily. No h/o HE symptoms. He has had rare episodes of double vision and has had evaluation with ophthalmology. He is seen to have cataracts but no KF rings. ALLERGIES  No Known Allergies    MEDICATIONS  Current Outpatient Medications   Medication Sig    meclizine (ANTIVERT) 25 mg tablet Take  by mouth three (3) times daily as needed for Dizziness. pantoprazole (PROTONIX) 40 mg tablet Take 20 mg by mouth. Galzin 50 mg (zinc) cap Take 1 Capsule by mouth three (3) times daily. saw palmetto 160 mg cap Take 320 mg by mouth. OTHER Vitamin K2 MK-9 200    OTHER PC Liver and Brain Benefit    OTHER Collagen Marine Powder    OTHER Collagen Biocell with hyalauronic acid    inulin (FIBER GUMMIES PO) Take  by mouth every morning. psyllium (METAMUCIL) powd Take  by mouth. MULTIVITAMIN PO Take  by mouth. Takes one po once daily. OMEGA-3 FATTY ACIDS/FISH OIL (OMEGA 3 FISH OIL PO) Take 1 Cap by mouth daily. omeprazole (PRILOSEC) 40 mg capsule 1 (ONE) CAPSULE DR BY MOUTH DAILY (Patient not taking: Reported on 9/19/2022)     No current facility-administered medications for this visit.        SYSTEM REVIEW NOT RELATED TO LIVER DISEASE OR REVIEWED ABOVE:  Constitution systems: Negative for fever, chills. Weight still appropriate. Eyes: Negative for visual changes. ENT: Negative for sore throat, painful swallowing. Respiratory: Negative for cough, hemoptysis, SOB. Cardiology: Negative for chest pain, palpitations. GI:  Negative for constipation or diarrhea. Occasional RUQ pain. : Negative for urinary frequency, dysuria, hematuria, nocturia. Skin: Negative for rash. Hematology: Negative for easy bruising, blood clots. Musculo-skeletal: Negative for back pain, muscle pain, weakness. Neurologic: Negative for headaches, dizziness, vertigo, memory problems not related to HE. Psychology: Negative for anxiety, depression. FAMILY HISTORY:  The father  of accident. The mother  of glioblastoma. A brother  of Rashawn diease. Several siblings have Rashawn disease. SOCIAL HISTORY:  The patient is . The patient has no children. The patient has never used tobacco products. The patient consumes alcohol on rare social occasions never in excess. The patient currently works as a cow farmer. Retired  (2021). PHYSICAL EXAMINATION:  /65 (BP 1 Location: Left upper arm, BP Patient Position: Sitting, BP Cuff Size: Adult long)   Pulse 75   Temp 97.6 °F (36.4 °C) (Temporal)   Ht 6' (1.829 m)   Wt 181 lb (82.1 kg)   SpO2 99%   BMI 24.55 kg/m²   General: No acute distress. Eyes: Sclera anicteric. ENT: No oral lesions. Thyroid normal.  Nodes: No adenopathy. Skin: No spider angiomata. No jaundice. No palmar erythema. Respiratory: Lungs clear to auscultation. Cardiovascular: Regular heart rate. No murmurs. No JVD. Abdomen: Soft non-tender. Liver size normal to percussion/palpation. Spleen not palpable. No obvious ascites. Extremities: No edema. No muscle wasting. No gross arthritic changes. Neurologic: Alert and oriented. Cranial nerves grossly intact.   No asterixis. LABORATORY STUDIES:  Liver [place] of [address] Units 12/12/2022 6/8/2022   WBC 3.4 - 10.8 x10E3/uL 6.4 6.4   HGB 13.0 - 17.7 g/dL 13.7 14.2    - 450 x10E3/uL 234 237   INR 0.9 - 1.2  1.1   AST 0 - 40 IU/L 40 33   ALT 0 - 44 IU/L 41 33   Alk Phos 44 - 121 IU/L 140 (H) 143 (H)   Bili, Total 0.0 - 1.2 mg/dL 0.4 0.7   Bili, Direct 0.00 - 0.40 mg/dL 0.12 0.15   Albumin 3.8 - 4.8 g/dL 4.4 4.5   BUN 8 - 27 mg/dL 16 24   Creat 0.76 - 1.27 mg/dL 0.89 1.05   Na 134 - 144 mmol/L 139 140   K 3.5 - 5.2 mmol/L 4.5 4.6   Cl 96 - 106 mmol/L 102 103   CO2 20 - 29 mmol/L 23 23   Glucose 70 - 99 mg/dL 97 91     Liver [place] of [address] [place] [address] Ref Rng & Units 3/16/2022   WBC 3.4 - 10.8 x10E3/uL 6.7   HGB 13.0 - 17.7 g/dL 14.5    - 450 x10E3/uL 226   INR 0.9 - 1.2 1.1   AST 0 - 40 IU/L 35   ALT 0 - 44 IU/L 39   Alk Phos 44 - 121 IU/L 142 (H)   Bili, Total 0.0 - 1.2 mg/dL 0.6   Bili, Direct 0.00 - 0.40 mg/dL 0.18   Albumin 3.8 - 4.8 g/dL 4.9   BUN 8 - 27 mg/dL 23   Creat 0.76 - 1.27 mg/dL 0.90   Na 134 - 144 mmol/L 141   K 3.5 - 5.2 mmol/L 4.5   Cl 96 - 106 mmol/L 102   CO2 20 - 29 mmol/L 23   Glucose 70 - 99 mg/dL 94     From 9/2022  AST/ALT/ALP/T Bili/ALB:31/46/139/0.2/4.6  WBC/HB/PLT/INR: 6.9/14.1/241/1.05  NA/BUN/CREAT:143/22/0.86  Ceruloplasmin 4 mg/dL  Copper, serum 10 mcg/dL  GGT: 13 U/L         Ferritin 184  TS 26%  Iron 75    Cancer Screening Latest Ref Rng & Units 12/12/2022 6/8/2022 3/16/2022   AFP, Serum 0.0 - 8.4 ng/mL 1.3 1.4 1.4   AFP-L3% 0.0 - 9.9 % Comment Comment Comment   Additional lab values drawn at today's office visit are pending at the time of documentation.     SEROLOGIES:  Serologies Latest Ref Rng & Units 2/3/2017   Hep A Ab, Total Negative Positive (A)   Hep B Surface Ag Negative Negative   Hep B Core Ab, Total Negative Negative   Hep B Surface AB QL  Non Reactive   Hep C Ab 0.0 - 0.9 s/co ratio <0.1   MANJINDER, IFA  Negative   C-ANCA Neg:<1:20 titer <1:20   P-ANCA Neg:<1:20 titer <1:20   ANCA Neg:<1:20 titer <1:20   ASMCA 0 - 19 Units 19   M2 Ab 0.0 - 20.0 Units 14.7   Ceruloplasmin 16.0 - 31.0 mg/dL 4.9 (L)   2/2017. Rashawn disease genetic testing. Two variants in ATP7B gene are associated with Rashawn disease. This is consistent with Rashawn disease. 4/2022. [facility] for [employer] study. Heterozygous for two variants (autosomal recessive) for WD, this result is strongly suggestive of diagnosis WD.    LIVER HISTOLOGY:  2/2017. FibroScan performed at [facility]. EkPa was 21.8. IQR/med 28%. The results suggested a fibrosis level of F4.  4/2017. Slides reviewed by MLS. Sarcoid granulomas, RIVERA. 25-33% macro and micovesicular steatosis. Mild ballooning. Mild inflammation. Cirrhosis. 2/2019. FibroScan performed at [facility]. EkPa was 10.9. Suggested fibrosis level is F3. CAP score is 209, this is consistent with no increased steatosis. 6/2019. Liver biopsy done at the time of CCY. Lobular granulomatous inflammation. Fibrosis: Score 3, numerous septae with occasional bridges. No steatosis. Mild portal inflammation. Very minimal hepatocellular iron. 9/2021. FibroScan performed at [facility]. EkPa was 7.6. Suggested fibrosis level is F2. CAP score is 177. ENDOSCOPIC PROCEDURES:  9/2016. EGD by Dr Cuco Kaur. No esophageal varices. Gastritis. 6/2018. Colonoscopy by Dr Cuco Kaur. Internal hemorrhoids, repeat in 10 years. 5/2019. EGD by Dr Cuco Kaur. Gastritis and no varices. 8/2022. EGD performed by Dr Jammie Ramírez. Chronic GERD and gastritis, switched to pantoprazole. No ulcer and no H pylori. No varices. Esophageal stricture requiring dilation. RADIOLOGY:  2/2018. Ultrasound of liver. Normal appearing liver. No liver mass lesions. Immobile gallstone in the neck of the gallbladder. 8/2018. Ultrasound of liver. Normal appearing liver. No liver mass lesions. Immobile gallstone in the neck of the gallbladder, unchanged. 2/2019. Ultrasound of liver. Normal appearing liver. No liver mass lesions. Gallstones, no evidence of infection or inflammation. 8/2019. Ultrasound of liver. Heterogeneous liver without evidence for focal mass. No splenomegaly. No ascites. 2/2020. Ultrasound of liver. Coarse echogenicity of the liver with possible nodular contour consistent with underlying hepatic parenchymal disease, possibly cirrhosis. Patent hepatic vasculature. Heterogeneous liver without evidence for focal mass. No splenomegaly. No ascites. 8/2020. Ultrasound of liver. Stable diffusely coarsened liver echotexture suggesting hepatic parenchymal  disease. No focal liver mass. No ascites. Patent hepatic vasculature. 3/2021. Ultrasound of liver. Echogenic consistent with cirrhosis. No liver mass lesions. No dilated bile ducts. No ascites. 9/2021. Ultrasound of liver. Echogenic consistent with cirrhosis. No liver mass lesions. No dilated bile ducts. No ascites  3/2022. Ultrasound of liver. No evidence for abnormal liver mass or ascites. No splenomegaly. 9/2022. Ultrasound of liver. No evidence for abnormal liver mass or ascites. No splenomegaly. 3/2023. Ultrasound of abdomen. Pending at the time of office visit. OTHER TESTING:  Not available or performed    ASSESSMENT AND PLAN:  Cirrhosis secondary to several etiologies which include treated Rashawn disease, RIVERA and sarcoidosis. The ALP is elevated secondary to hepatic sarcoidosis. Labs have otherwise remained stable and function is well-supported     There is no treatment for hepatic sarcoidosis. Steroids are ineffective except in reducing fevers, anorexia and tender hepatomegaly in severe inflammatory sarcoidosis which is not the case here.   He has recently had increased shortness of breath with activity, this may be a function of recent COVID infection and he is maintaining follow-up with pulmonary. Serum copper and ceruloplasmin are low consistent with Rashawn disease. Firman Nicks disease genetic testing is consistent with 2 mutations, both of which are associated with Rashawn disease. The patient has been treated for Rashawn disease with Zinc for many years. Will continue this treatment as he is tolerating well and recent evaluation with urinary copper and serum copper levels have shown excellent response. Will follow-up on urinary copper lab testing. Recent progression in balance issues. Patient is pursuing with neurology as appropriate. Recent brain MRI was unremarkable. I have drawn serum ammonia in the office today, but his symptoms are not classic for HE. Encouraged him to continue with planned evaluations with neuro and ENT. The patient was directed to continue all current medications at the current dosages. There are no contraindications for the patient to take any medications that are necessary for treatment of other medical issues. The patient was counseled regarding diet and exercise to achieve weight loss. He has had great success with this course and is at target weight. Will plan on maintenance at weight of ~170#. Patient understands rationale for monitoring of cirrhosis complications. He will need to have US of the liver every 6 months to rule out [address] this is now up to date. Will plan repeat in 6 months in conjunction with next in-person evaluation. He has had recent repeat EGD evaluation with no evidence of varices and has had no bleeding signs/symptoms. Plan repeat 8/2025. The patient was counseled regarding alcohol consumption. He is a non-drinker. Vaccination for viral hepatitis A is not needed. The patient has serologic evidence of prior exposure or vaccination with immunity. All of the above issues were discussed with the patient. All questions were answered. The patient expressed a clear understanding of the above.     Patyt Prabhakar [facility] in 6 months with repeat US of the liver, alternating with local MD every other 6 months.      Estela Valenzuela PA-C  Liver [facility], [address]  [address].  [phone]  [address] [facility]

## 2023-03-23 DIAGNOSIS — E83.01 WILSON'S DISEASE: ICD-10-CM

## 2023-03-23 DIAGNOSIS — K74.60 CIRRHOSIS OF LIVER WITHOUT ASCITES, UNSPECIFIED HEPATIC CIRRHOSIS TYPE (HCC): Primary | ICD-10-CM

## 2023-03-23 LAB
ALBUMIN SERPL-MCNC: 4.2 G/DL (ref 3.5–5)
ALBUMIN/GLOB SERPL: 1 (ref 1.1–2.2)
ALP SERPL-CCNC: 136 U/L (ref 45–117)
ALT SERPL-CCNC: 57 U/L (ref 12–78)
ANION GAP SERPL CALC-SCNC: 1 MMOL/L (ref 5–15)
AST SERPL-CCNC: 40 U/L (ref 15–37)
BILIRUB DIRECT SERPL-MCNC: 0.2 MG/DL (ref 0–0.2)
BILIRUB SERPL-MCNC: 0.8 MG/DL (ref 0.2–1)
BUN SERPL-MCNC: 20 MG/DL (ref 6–20)
BUN/CREAT SERPL: 20 (ref 12–20)
CALCIUM SERPL-MCNC: 9.5 MG/DL (ref 8.5–10.1)
CHLORIDE SERPL-SCNC: 106 MMOL/L (ref 97–108)
CO2 SERPL-SCNC: 30 MMOL/L (ref 21–32)
CREAT SERPL-MCNC: 0.98 MG/DL (ref 0.7–1.3)
ERYTHROCYTE [DISTWIDTH] IN BLOOD BY AUTOMATED COUNT: 13.6 % (ref 11.5–14.5)
GLOBULIN SER CALC-MCNC: 4.1 G/DL (ref 2–4)
GLUCOSE SERPL-MCNC: 91 MG/DL (ref 65–100)
HCT VFR BLD AUTO: 41 % (ref 36.6–50.3)
HGB BLD-MCNC: 14.1 G/DL (ref 12.1–17)
INR PPP: 1.1 (ref 0.9–1.1)
MCH RBC QN AUTO: 31.3 PG (ref 26–34)
MCHC RBC AUTO-ENTMCNC: 34.4 G/DL (ref 30–36.5)
MCV RBC AUTO: 91.1 FL (ref 80–99)
NRBC # BLD: 0 K/UL (ref 0–0.01)
NRBC BLD-RTO: 0 PER 100 WBC
PLATELET # BLD AUTO: 214 K/UL (ref 150–400)
PMV BLD AUTO: 10.5 FL (ref 8.9–12.9)
POTASSIUM SERPL-SCNC: 4.5 MMOL/L (ref 3.5–5.1)
PROT SERPL-MCNC: 8.3 G/DL (ref 6.4–8.2)
PROTHROMBIN TIME: 11.4 SEC (ref 9–11.1)
RBC # BLD AUTO: 4.5 M/UL (ref 4.1–5.7)
SODIUM SERPL-SCNC: 137 MMOL/L (ref 136–145)
WBC # BLD AUTO: 6.7 K/UL (ref 4.1–11.1)

## 2023-03-23 NOTE — PROGRESS NOTES
Pt notified via [facility] letter of stable findings, will follow-up on Cu, ammonia, and AFP when available. Ultrasound from 3/22 also without new mass/lesion. Will repeat in 6 months.

## 2023-03-24 ENCOUNTER — DOCUMENTATION ONLY (OUTPATIENT)
Dept: HEMATOLOGY | Age: 63
End: 2023-03-24

## 2023-03-24 LAB
AFP L3 MFR SERPL: NORMAL % (ref 0–9.9)
AFP SERPL-MCNC: 1.3 NG/ML (ref 0–8.4)
AMMONIA PLAS-SCNC: 30 UMOL/L

## 2023-03-25 LAB — COPPER SERPL-MCNC: 19 UG/DL (ref 69–132)

## 2023-04-07 ENCOUNTER — OFFICE VISIT (OUTPATIENT)
Dept: NEUROLOGY | Age: 63
End: 2023-04-07

## 2023-04-23 DIAGNOSIS — K74.60 CIRRHOSIS OF LIVER WITHOUT ASCITES, UNSPECIFIED HEPATIC CIRRHOSIS TYPE (HCC): Primary | ICD-10-CM

## 2023-04-24 ENCOUNTER — TRANSCRIBE ORDER (OUTPATIENT)
Dept: SCHEDULING | Age: 63
End: 2023-04-24

## 2023-04-24 DIAGNOSIS — H93.13 TINNITUS, BILATERAL: Primary | ICD-10-CM

## 2023-04-24 DIAGNOSIS — H90.A32 MIXED CONDUCTIVE AND SENSORINEURAL HEARING LOSS OF LEFT EAR WITH RESTRICTED HEARING OF RIGHT EAR: ICD-10-CM

## 2023-04-24 DIAGNOSIS — H83.2X1: ICD-10-CM

## 2023-05-04 ENCOUNTER — HOSPITAL ENCOUNTER (OUTPATIENT)
Dept: CT IMAGING | Age: 63
Discharge: HOME | End: 2023-05-04
Attending: SPECIALIST
Payer: COMMERCIAL

## 2023-05-04 DIAGNOSIS — H93.13 TINNITUS, BILATERAL: ICD-10-CM

## 2023-05-04 DIAGNOSIS — H83.2X1: ICD-10-CM

## 2023-05-04 DIAGNOSIS — H90.A32 MIXED CONDUCTIVE AND SENSORINEURAL HEARING LOSS OF LEFT EAR WITH RESTRICTED HEARING OF RIGHT EAR: ICD-10-CM

## 2023-05-04 PROCEDURE — 70480 CT ORBIT/EAR/FOSSA W/O DYE: CPT

## 2023-05-17 RX ORDER — PANTOPRAZOLE SODIUM 40 MG/1
20 TABLET, DELAYED RELEASE ORAL
COMMUNITY
Start: 2022-04-11

## 2023-05-17 RX ORDER — ZINC ACETATE 50 MG/1
1 CAPSULE ORAL 3 TIMES DAILY
COMMUNITY
Start: 2022-09-19

## 2023-05-17 RX ORDER — MECLIZINE HYDROCHLORIDE 25 MG/1
TABLET ORAL 3 TIMES DAILY PRN
COMMUNITY

## 2023-06-28 ENCOUNTER — CLINICAL DOCUMENTATION (OUTPATIENT)
Age: 63
End: 2023-06-28

## 2023-07-03 ENCOUNTER — CLINICAL DOCUMENTATION (OUTPATIENT)
Age: 63
End: 2023-07-03

## 2023-07-03 NOTE — PROGRESS NOTES
Lab results received from   [facility] (encounter 6.21.23) and placed in provider's box to review.

## 2023-09-25 ENCOUNTER — OFFICE VISIT (OUTPATIENT)
Age: 63
End: 2023-09-25
Payer: COMMERCIAL

## 2023-09-25 ENCOUNTER — HOSPITAL ENCOUNTER (OUTPATIENT)
Facility: HOSPITAL | Age: 63
Discharge: HOME | End: 2023-09-28
Payer: COMMERCIAL

## 2023-09-25 VITALS
DIASTOLIC BLOOD PRESSURE: 62 MMHG | HEIGHT: 72 IN | WEIGHT: 189 LBS | HEART RATE: 78 BPM | BODY MASS INDEX: 25.6 KG/M2 | TEMPERATURE: 97 F | OXYGEN SATURATION: 100 % | SYSTOLIC BLOOD PRESSURE: 120 MMHG

## 2023-09-25 DIAGNOSIS — Z12.5 SCREENING FOR MALIGNANT NEOPLASM OF PROSTATE: ICD-10-CM

## 2023-09-25 DIAGNOSIS — Z13.1 SCREENING FOR DIABETES MELLITUS: ICD-10-CM

## 2023-09-25 DIAGNOSIS — K74.60 CIRRHOSIS OF LIVER WITHOUT ASCITES, UNSPECIFIED HEPATIC CIRRHOSIS TYPE (HCC): ICD-10-CM

## 2023-09-25 DIAGNOSIS — K74.60 CIRRHOSIS OF LIVER WITHOUT ASCITES, UNSPECIFIED HEPATIC CIRRHOSIS TYPE (HCC): Primary | ICD-10-CM

## 2023-09-25 DIAGNOSIS — Z13.220 SCREENING FOR HYPERLIPIDEMIA: ICD-10-CM

## 2023-09-25 DIAGNOSIS — E83.01 WILSON'S DISEASE: ICD-10-CM

## 2023-09-25 PROCEDURE — 99214 OFFICE O/P EST MOD 30 MIN: CPT | Performed by: PHYSICIAN ASSISTANT

## 2023-09-25 PROCEDURE — 76700 US EXAM ABDOM COMPLETE: CPT

## 2023-09-25 RX ORDER — ZINC ACETATE 50 MG/1
50 CAPSULE ORAL 3 TIMES DAILY
Qty: 270 CAPSULE | Refills: 3 | Status: SHIPPED | OUTPATIENT
Start: 2023-09-25

## 2023-09-25 NOTE — PROGRESS NOTES
MD Delaney, FACP, [place], [place]      ÁNGELA Talavera, [facility]-BC   Zhao Gonzalez, [facility]   MARILEE Devine FNP-C   Juanito Mcgregor, [facility]      [address] [address], [place]   at [facility]   [address] [facility], [address], [address]   [phone]   FAX: [phone]  Liver [place] of [facility]   at [facility], [address] [facility], [address]   [phone]   FAX: [phone]     Patient Care Team:  Trini Ross MD as PCP - General    Patient Active Problem List   Diagnosis    CARRION (nonalcoholic steatohepatitis)    Sarcoidosis    Bell's palsy    Cholelithiasis    Cirrhosis of liver without ascites ([address])    Billy's disease     Surya Marrero returns to the [address] [place] [address],[address] [facility] for management of Billy disease, hepatic steatosis and sarcoidosis. The active problem list, all pertinent past medical history, medications, radiologic findings and laboratory findings related to the liver disorder were reviewed with the patient. The patient is a 59 y.o.  male who was found to have Billy disease in the 1970s. He was monitored at [facility]. He was initially treated with Penicillamine. He has been treated on maintenance Zinc for the past ~12+ years. He has continued to tolerate this medication well and has had his annual evaluation with Dr Juancho Morton at [place] within the past month. No changes in course or medication recommended. He has continued with administration of zinc in the form of Mario Gan on a regular basis. In 2016 he was found to have enlarged lymph nodes on chest XR and was found to have sarcoidosis. He has been largely without other symptoms of sarcoidosis but has noted some increase in exercise/stamina issues.   He

## 2023-09-27 ENCOUNTER — CLINICAL DOCUMENTATION (OUTPATIENT)
Age: 63
End: 2023-09-27

## 2023-09-27 DIAGNOSIS — E83.01 WILSON'S DISEASE: Primary | ICD-10-CM

## 2023-09-27 DIAGNOSIS — K74.60 CIRRHOSIS OF LIVER WITHOUT ASCITES, UNSPECIFIED HEPATIC CIRRHOSIS TYPE (HCC): ICD-10-CM

## 2023-11-20 ENCOUNTER — CLINICAL DOCUMENTATION (OUTPATIENT)
Age: 63
End: 2023-11-20

## 2023-11-20 DIAGNOSIS — E83.01 WILSON'S DISEASE: Primary | ICD-10-CM

## 2023-11-27 LAB
ERYTHROCYTE [DISTWIDTH] IN BLOOD BY AUTOMATED COUNT: 13 % (ref 11.6–15.4)
HCT VFR BLD AUTO: 42.5 % (ref 37.5–51)
HGB BLD-MCNC: 14.6 G/DL (ref 13–17.7)
MCH RBC QN AUTO: 31.7 PG (ref 26.6–33)
MCHC RBC AUTO-ENTMCNC: 34.4 G/DL (ref 31.5–35.7)
MCV RBC AUTO: 92 FL (ref 79–97)
PLATELET # BLD AUTO: 217 X10E3/UL (ref 150–450)
RBC # BLD AUTO: 4.61 X10E6/UL (ref 4.14–5.8)
WBC # BLD AUTO: 5.4 X10E3/UL (ref 3.4–10.8)

## 2023-11-28 LAB
AFP L3 MFR SERPL: NORMAL % (ref 0–9.9)
AFP SERPL-MCNC: 1.2 NG/ML (ref 0–8.4)
ALBUMIN SERPL-MCNC: 4.5 G/DL (ref 3.9–4.9)
ALP SERPL-CCNC: 151 IU/L (ref 44–121)
ALT SERPL-CCNC: 56 IU/L (ref 0–44)
AMMONIA PLAS-MCNC: 45 UG/DL (ref 40–200)
AST SERPL-CCNC: 40 IU/L (ref 0–40)
BILIRUB DIRECT SERPL-MCNC: 0.15 MG/DL (ref 0–0.4)
BILIRUB SERPL-MCNC: 0.5 MG/DL (ref 0–1.2)
BUN SERPL-MCNC: 15 MG/DL (ref 8–27)
BUN/CREAT SERPL: 17 (ref 10–24)
CALCIUM SERPL-MCNC: 9.9 MG/DL (ref 8.6–10.2)
CHLORIDE SERPL-SCNC: 102 MMOL/L (ref 96–106)
CHOLEST SERPL-MCNC: 171 MG/DL (ref 100–199)
CO2 SERPL-SCNC: 24 MMOL/L (ref 20–29)
CREAT SERPL-MCNC: 0.89 MG/DL (ref 0.76–1.27)
EGFRCR SERPLBLD CKD-EPI 2021: 97 ML/MIN/1.73
GLUCOSE SERPL-MCNC: 96 MG/DL (ref 70–99)
HBA1C MFR BLD: 5.2 % (ref 4.8–5.6)
HDLC SERPL-MCNC: 44 MG/DL
INR PPP: 1.1 (ref 0.9–1.2)
LDLC SERPL CALC-MCNC: 111 MG/DL (ref 0–99)
POTASSIUM SERPL-SCNC: 4.6 MMOL/L (ref 3.5–5.2)
PROT SERPL-MCNC: 7.5 G/DL (ref 6–8.5)
PROTHROMBIN TIME: 11.9 SEC (ref 9.1–12)
PSA SERPL-MCNC: 0.3 NG/ML (ref 0–4)
SODIUM SERPL-SCNC: 139 MMOL/L (ref 134–144)
TRIGL SERPL-MCNC: 87 MG/DL (ref 0–149)
VLDLC SERPL CALC-MCNC: 16 MG/DL (ref 5–40)

## 2023-12-06 LAB — ZINC SERPL-MCNC: 174 UG/DL (ref 44–115)

## 2024-03-25 ENCOUNTER — OFFICE VISIT (OUTPATIENT)
Age: 64
End: 2024-03-25
Payer: COMMERCIAL

## 2024-03-25 ENCOUNTER — HOSPITAL ENCOUNTER (OUTPATIENT)
Facility: HOSPITAL | Age: 64
Discharge: HOME | End: 2024-03-28
Payer: COMMERCIAL

## 2024-03-25 VITALS
BODY MASS INDEX: 25.68 KG/M2 | RESPIRATION RATE: 14 BRPM | WEIGHT: 189.6 LBS | HEIGHT: 72 IN | HEART RATE: 74 BPM | DIASTOLIC BLOOD PRESSURE: 69 MMHG | SYSTOLIC BLOOD PRESSURE: 116 MMHG

## 2024-03-25 DIAGNOSIS — E83.01 WILSON'S DISEASE: Primary | ICD-10-CM

## 2024-03-25 DIAGNOSIS — E83.01 WILSON'S DISEASE: ICD-10-CM

## 2024-03-25 DIAGNOSIS — K74.60 CIRRHOSIS OF LIVER WITHOUT ASCITES, UNSPECIFIED HEPATIC CIRRHOSIS TYPE (HCC): ICD-10-CM

## 2024-03-25 PROCEDURE — 99214 OFFICE O/P EST MOD 30 MIN: CPT | Performed by: PHYSICIAN ASSISTANT

## 2024-03-25 PROCEDURE — 76700 US EXAM ABDOM COMPLETE: CPT

## 2024-03-25 RX ORDER — POLYETHYLENE GLYCOL 3350 17 G/17G
17 POWDER, FOR SOLUTION ORAL 2 TIMES DAILY
COMMUNITY

## 2024-03-25 RX ORDER — CHLORAL HYDRATE 500 MG
1000 CAPSULE ORAL DAILY
COMMUNITY

## 2024-03-25 NOTE — PROGRESS NOTES
continued to follow with pulmonary and has annual chest imaging and PFTs done generally in September.     He has had progressive issues with dizziness and balance and loss of hearing in the right ear. Evaluation with neuro in 12/2022 brain MRI normal as was repeat CT head in 5/2023.  This presents with a sense of falling with movement.  He has seen Dr Panda in 4/2023 for hearing loss and/or balance and cognitive concerns.  He has established with the sarcoid group at [facility] for additional testing and is now connected with the neurology specialist.  He has been having progressive neuropathy and \"twitching\" of the upper right thigh.  He is due for repeat MRI evaluation of head and spine.  The neurologist has indicated that she suspects this may be associated with zinc/treatment for WD as opposed to neurosarcoid.    Serologic evaluation for markers of chronic liver disease are positive for a low ceruloplasmin and low serum copper.    The most recent imaging of the liver was ultrasound was performed this morning, this study is pending at the time of office visit.  His last in 9/2023 shows coarse liver texture, without mass/lesion or ascites.    Past Fibroscan was 21.8 kPa which correlates with cirrhosis. This was repeated in 2/2019 with a score consistent with F3 and again repeated in 9/2021 showing EkPa was 7.6.  Suggested fibrosis level is F2. CAP score is 177.    The patient underwent a liver biopsy in 4/2017.  This demonstrates features of CARRION, sarcoid granulomas and cirrhosis. This was recently repeated in 2019 at the time of [facility], showing bridging septa and granulomatous inflammation consistent with sarcoidosis. He has since had reduction in the frequency and intensity of right sided abdominal pain and has no surgical related complaints.     The patient has no symptoms which could be attributed to the liver disorder.  The patient completes all daily activities without any functional limitations.  The patient has not

## 2024-03-26 LAB
ALBUMIN SERPL-MCNC: 3.7 G/DL (ref 3.5–5)
ALBUMIN/GLOB SERPL: 0.9 (ref 1.1–2.2)
ALP SERPL-CCNC: 164 U/L (ref 45–117)
ALT SERPL-CCNC: 58 U/L (ref 12–78)
AMMONIA PLAS-SCNC: 11 UMOL/L
ANION GAP SERPL CALC-SCNC: 4 MMOL/L (ref 5–15)
AST SERPL-CCNC: 37 U/L (ref 15–37)
BILIRUB DIRECT SERPL-MCNC: 0.2 MG/DL (ref 0–0.2)
BILIRUB SERPL-MCNC: 0.5 MG/DL (ref 0.2–1)
BUN SERPL-MCNC: 17 MG/DL (ref 6–20)
BUN/CREAT SERPL: 18 (ref 12–20)
CALCIUM SERPL-MCNC: 9.4 MG/DL (ref 8.5–10.1)
CHLORIDE SERPL-SCNC: 107 MMOL/L (ref 97–108)
CO2 SERPL-SCNC: 27 MMOL/L (ref 21–32)
CREAT SERPL-MCNC: 0.94 MG/DL (ref 0.7–1.3)
ERYTHROCYTE [DISTWIDTH] IN BLOOD BY AUTOMATED COUNT: 13.4 % (ref 11.5–14.5)
GLOBULIN SER CALC-MCNC: 4.1 G/DL (ref 2–4)
GLUCOSE SERPL-MCNC: 96 MG/DL (ref 65–100)
HCT VFR BLD AUTO: 41.5 % (ref 36.6–50.3)
HGB BLD-MCNC: 14.3 G/DL (ref 12.1–17)
INR PPP: 1.1 (ref 0.9–1.1)
MCH RBC QN AUTO: 31.5 PG (ref 26–34)
MCHC RBC AUTO-ENTMCNC: 34.5 G/DL (ref 30–36.5)
MCV RBC AUTO: 91.4 FL (ref 80–99)
NRBC # BLD: 0 K/UL (ref 0–0.01)
NRBC BLD-RTO: 0 PER 100 WBC
PLATELET # BLD AUTO: 248 K/UL (ref 150–400)
PMV BLD AUTO: 10 FL (ref 8.9–12.9)
POTASSIUM SERPL-SCNC: 4.1 MMOL/L (ref 3.5–5.1)
PROT SERPL-MCNC: 7.8 G/DL (ref 6.4–8.2)
PROTHROMBIN TIME: 11.5 SEC (ref 9–11.1)
RBC # BLD AUTO: 4.54 M/UL (ref 4.1–5.7)
SODIUM SERPL-SCNC: 138 MMOL/L (ref 136–145)
WBC # BLD AUTO: 7.5 K/UL (ref 4.1–11.1)

## 2024-03-27 LAB
AFP L3 MFR SERPL: NORMAL % (ref 0–9.9)
AFP SERPL-MCNC: 1.3 NG/ML (ref 0–8.4)

## 2024-03-28 NOTE — RESULT ENCOUNTER NOTE
Pt notified via "[employer] message of stable findings. Will follow-up on Cu and Zn when available.  Follow-up as scheduled.

## 2024-03-29 LAB — ZINC BLD-MCNC: 672 UG/DL (ref 440–860)

## 2024-04-05 LAB — COPPER: <0.5 UG/ML (ref 0.5–1.5)

## 2024-05-22 DIAGNOSIS — K74.60 CIRRHOSIS OF LIVER WITHOUT ASCITES, UNSPECIFIED HEPATIC CIRRHOSIS TYPE (HCC): Primary | ICD-10-CM

## 2024-05-22 DIAGNOSIS — E83.01 WILSON'S DISEASE: ICD-10-CM

## 2024-05-23 ENCOUNTER — TELEPHONE (OUTPATIENT)
Age: 64
End: 2024-05-23

## 2024-09-25 ENCOUNTER — HOSPITAL ENCOUNTER (OUTPATIENT)
Facility: HOSPITAL | Age: 64
Discharge: HOME | End: 2024-09-28
Payer: COMMERCIAL

## 2024-09-25 ENCOUNTER — OFFICE VISIT (OUTPATIENT)
Age: 64
End: 2024-09-25
Payer: COMMERCIAL

## 2024-09-25 VITALS
HEART RATE: 70 BPM | SYSTOLIC BLOOD PRESSURE: 124 MMHG | BODY MASS INDEX: 23.86 KG/M2 | HEIGHT: 72 IN | OXYGEN SATURATION: 100 % | TEMPERATURE: 98.1 F | WEIGHT: 176.2 LBS | DIASTOLIC BLOOD PRESSURE: 77 MMHG

## 2024-09-25 DIAGNOSIS — E83.01 WILSON'S DISEASE: ICD-10-CM

## 2024-09-25 DIAGNOSIS — K74.60 CIRRHOSIS OF LIVER WITHOUT ASCITES, UNSPECIFIED HEPATIC CIRRHOSIS TYPE (HCC): ICD-10-CM

## 2024-09-25 PROCEDURE — 91200 LIVER ELASTOGRAPHY: CPT | Performed by: PHYSICIAN ASSISTANT

## 2024-09-25 PROCEDURE — 99214 OFFICE O/P EST MOD 30 MIN: CPT | Performed by: PHYSICIAN ASSISTANT

## 2024-09-25 PROCEDURE — 76700 US EXAM ABDOM COMPLETE: CPT

## 2024-09-25 RX ORDER — ZINC ACETATE 50 MG/1
50 CAPSULE ORAL 3 TIMES DAILY
Qty: 270 CAPSULE | Refills: 3 | Status: SHIPPED | OUTPATIENT
Start: 2024-09-25

## 2024-09-25 ASSESSMENT — PATIENT HEALTH QUESTIONNAIRE - PHQ9
SUM OF ALL RESPONSES TO PHQ QUESTIONS 1-9: 0
1. LITTLE INTEREST OR PLEASURE IN DOING THINGS: NOT AT ALL
SUM OF ALL RESPONSES TO PHQ9 QUESTIONS 1 & 2: 0
2. FEELING DOWN, DEPRESSED OR HOPELESS: NOT AT ALL
SUM OF ALL RESPONSES TO PHQ QUESTIONS 1-9: 0
DEPRESSION UNABLE TO ASSESS: FUNCTIONAL CAPACITY MOTIVATION LIMITS ACCURACY

## 2024-09-25 ASSESSMENT — ANXIETY QUESTIONNAIRES
GAD7 TOTAL SCORE: 0
7. FEELING AFRAID AS IF SOMETHING AWFUL MIGHT HAPPEN: NOT AT ALL
1. FEELING NERVOUS, ANXIOUS, OR ON EDGE: NOT AT ALL
5. BEING SO RESTLESS THAT IT IS HARD TO SIT STILL: NOT AT ALL
2. NOT BEING ABLE TO STOP OR CONTROL WORRYING: NOT AT ALL
3. WORRYING TOO MUCH ABOUT DIFFERENT THINGS: NOT AT ALL
4. TROUBLE RELAXING: NOT AT ALL
IF YOU CHECKED OFF ANY PROBLEMS ON THIS QUESTIONNAIRE, HOW DIFFICULT HAVE THESE PROBLEMS MADE IT FOR YOU TO DO YOUR WORK, TAKE CARE OF THINGS AT HOME, OR GET ALONG WITH OTHER PEOPLE: NOT DIFFICULT AT ALL
6. BECOMING EASILY ANNOYED OR IRRITABLE: NOT AT ALL

## 2024-09-26 LAB — AMMONIA PLAS-SCNC: 21 UMOL/L

## 2024-09-27 LAB
AFP L3 MFR SERPL: NORMAL % (ref 0–9.9)
AFP SERPL-MCNC: 1.3 NG/ML (ref 0–8.4)

## 2024-09-28 LAB — ZINC SERPL-MCNC: 142 UG/DL (ref 44–115)

## 2024-10-01 ENCOUNTER — CLINICAL DOCUMENTATION (OUTPATIENT)
Age: 64
End: 2024-10-01

## 2024-10-01 DIAGNOSIS — K74.60 CIRRHOSIS OF LIVER WITHOUT ASCITES, UNSPECIFIED HEPATIC CIRRHOSIS TYPE (HCC): Primary | ICD-10-CM

## 2025-03-11 ENCOUNTER — HOSPITAL ENCOUNTER (OUTPATIENT)
Facility: HOSPITAL | Age: 65
Discharge: HOME | End: 2025-03-14
Payer: COMMERCIAL

## 2025-03-11 DIAGNOSIS — K74.60 CIRRHOSIS OF LIVER WITHOUT ASCITES, UNSPECIFIED HEPATIC CIRRHOSIS TYPE (HCC): ICD-10-CM

## 2025-03-11 PROCEDURE — 76700 US EXAM ABDOM COMPLETE: CPT

## 2025-03-14 ENCOUNTER — RESULTS FOLLOW-UP (OUTPATIENT)
Facility: HOSPITAL | Age: 65
End: 2025-03-14

## 2025-03-25 ENCOUNTER — OFFICE VISIT (OUTPATIENT)
Age: 65
End: 2025-03-25
Payer: COMMERCIAL

## 2025-03-25 VITALS
DIASTOLIC BLOOD PRESSURE: 75 MMHG | HEIGHT: 72 IN | TEMPERATURE: 98.4 F | OXYGEN SATURATION: 98 % | BODY MASS INDEX: 27.68 KG/M2 | WEIGHT: 204.4 LBS | SYSTOLIC BLOOD PRESSURE: 123 MMHG | HEART RATE: 72 BPM

## 2025-03-25 DIAGNOSIS — K74.60 CIRRHOSIS OF LIVER WITHOUT ASCITES, UNSPECIFIED HEPATIC CIRRHOSIS TYPE (HCC): ICD-10-CM

## 2025-03-25 DIAGNOSIS — E83.01 WILSON'S DISEASE: Primary | ICD-10-CM

## 2025-03-25 DIAGNOSIS — Z12.5 SCREENING FOR MALIGNANT NEOPLASM OF PROSTATE: ICD-10-CM

## 2025-03-25 PROCEDURE — 99214 OFFICE O/P EST MOD 30 MIN: CPT | Performed by: PHYSICIAN ASSISTANT

## 2025-03-25 ASSESSMENT — ANXIETY QUESTIONNAIRES
1. FEELING NERVOUS, ANXIOUS, OR ON EDGE: NOT AT ALL
GAD7 TOTAL SCORE: 0
6. BECOMING EASILY ANNOYED OR IRRITABLE: NOT AT ALL
2. NOT BEING ABLE TO STOP OR CONTROL WORRYING: NOT AT ALL
5. BEING SO RESTLESS THAT IT IS HARD TO SIT STILL: NOT AT ALL
IF YOU CHECKED OFF ANY PROBLEMS ON THIS QUESTIONNAIRE, HOW DIFFICULT HAVE THESE PROBLEMS MADE IT FOR YOU TO DO YOUR WORK, TAKE CARE OF THINGS AT HOME, OR GET ALONG WITH OTHER PEOPLE: NOT DIFFICULT AT ALL
7. FEELING AFRAID AS IF SOMETHING AWFUL MIGHT HAPPEN: NOT AT ALL
4. TROUBLE RELAXING: NOT AT ALL
3. WORRYING TOO MUCH ABOUT DIFFERENT THINGS: NOT AT ALL

## 2025-03-25 ASSESSMENT — PATIENT HEALTH QUESTIONNAIRE - PHQ9
SUM OF ALL RESPONSES TO PHQ QUESTIONS 1-9: 0
DEPRESSION UNABLE TO ASSESS: FUNCTIONAL CAPACITY MOTIVATION LIMITS ACCURACY
1. LITTLE INTEREST OR PLEASURE IN DOING THINGS: NOT AT ALL
SUM OF ALL RESPONSES TO PHQ QUESTIONS 1-9: 0
2. FEELING DOWN, DEPRESSED OR HOPELESS: NOT AT ALL
SUM OF ALL RESPONSES TO PHQ QUESTIONS 1-9: 0
SUM OF ALL RESPONSES TO PHQ QUESTIONS 1-9: 0

## 2025-03-25 NOTE — PROGRESS NOTES
Chief Complaint   Patient presents with    Follow-up     Vitals:    03/25/25 1320   BP: 123/75   Pulse: 72   Temp: 98.4 °F (36.9 °C)   SpO2: 98%     \"Have you been to the ER, urgent care clinic since your last visit?  Hospitalized since your last visit?\"    NO    “Have you seen or consulted any other health care providers outside our system since your last visit?”    NO    “Have you had a colorectal cancer screening such as a colonoscopy/FIT/Cologuard?    YES - Type: Colonoscopy - Where: [facility]. 8/2024 Nurse/CMA to request most recent records if not in the chart     No colonoscopy on file  No cologuard on file  No FIT/FOBT on file   No flexible sigmoidoscopy on file            
exacerbated by limitations in walking related to his ongoing dizzy episodes. He is continuing to work at this and make careful dietary choices.     Patient understands rationale for monitoring of cirrhosis complications.  He will need to have US of the liver every 6 months to rule out HCC, this is now up to date. Will plan repeat in 6 months in conjunction with next in-person evaluation.     He has had recent repeat EGD evaluation with no evidence of varices and has had no bleeding signs/symptoms. Plan repeat 8/2025, ordered.     The patient was counseled regarding alcohol consumption. He is a non-drinker.     Vaccination for viral hepatitis A is not needed.  The patient has serologic evidence of prior exposure or vaccination with immunity.    All of the above issues were discussed with the patient.  All questions were answered.  The patient expressed a clear understanding of the above.    Follow-up [facility] in 6 months with repeat EGD and US of the liver, alternating with local MD every other 6 months.     Marcy Méndez PA-C  Liver [address], [address]  [place]  [address] [phone]  [facility]

## 2025-03-26 LAB
ALBUMIN SERPL-MCNC: 4 G/DL (ref 3.5–5)
ALBUMIN/GLOB SERPL: 1 (ref 1.1–2.2)
ALP SERPL-CCNC: 162 U/L (ref 45–117)
ALT SERPL-CCNC: 64 U/L (ref 12–78)
AMMONIA PLAS-SCNC: 21 UMOL/L
ANION GAP SERPL CALC-SCNC: 4 MMOL/L (ref 2–12)
AST SERPL-CCNC: 40 U/L (ref 15–37)
BILIRUB DIRECT SERPL-MCNC: 0.2 MG/DL (ref 0–0.2)
BILIRUB SERPL-MCNC: 0.7 MG/DL (ref 0.2–1)
BUN SERPL-MCNC: 18 MG/DL (ref 6–20)
BUN/CREAT SERPL: 19 (ref 12–20)
CALCIUM SERPL-MCNC: 9.8 MG/DL (ref 8.5–10.1)
CHLORIDE SERPL-SCNC: 105 MMOL/L (ref 97–108)
CO2 SERPL-SCNC: 28 MMOL/L (ref 21–32)
CREAT SERPL-MCNC: 0.95 MG/DL (ref 0.7–1.3)
ERYTHROCYTE [DISTWIDTH] IN BLOOD BY AUTOMATED COUNT: 13.6 % (ref 11.5–14.5)
GLOBULIN SER CALC-MCNC: 4 G/DL (ref 2–4)
GLUCOSE SERPL-MCNC: 98 MG/DL (ref 65–100)
HCT VFR BLD AUTO: 42.4 % (ref 36.6–50.3)
HGB BLD-MCNC: 14.1 G/DL (ref 12.1–17)
INR PPP: 1.1 (ref 0.9–1.1)
MCH RBC QN AUTO: 30.9 PG (ref 26–34)
MCHC RBC AUTO-ENTMCNC: 33.3 G/DL (ref 30–36.5)
MCV RBC AUTO: 92.8 FL (ref 80–99)
NRBC # BLD: 0 K/UL (ref 0–0.01)
NRBC BLD-RTO: 0 PER 100 WBC
PLATELET # BLD AUTO: 242 K/UL (ref 150–400)
PMV BLD AUTO: 10.5 FL (ref 8.9–12.9)
POTASSIUM SERPL-SCNC: 4.6 MMOL/L (ref 3.5–5.1)
PROT SERPL-MCNC: 8 G/DL (ref 6.4–8.2)
PROTHROMBIN TIME: 11.4 SEC (ref 9.2–11.2)
RBC # BLD AUTO: 4.57 M/UL (ref 4.1–5.7)
SODIUM SERPL-SCNC: 137 MMOL/L (ref 136–145)
WBC # BLD AUTO: 7.5 K/UL (ref 4.1–11.1)

## 2025-03-29 LAB — ZINC SERPL-MCNC: 122 UG/DL (ref 44–115)

## 2025-03-31 ENCOUNTER — RESULTS FOLLOW-UP (OUTPATIENT)
Age: 65
End: 2025-03-31

## 2025-03-31 LAB — COPPER SERPL-MCNC: 6 UG/DL (ref 69–132)

## 2025-03-31 NOTE — RESULT ENCOUNTER NOTE
Pt notified via [employer] message of stable findings. Will follow-up on AFP when available.  Follow-up as scheduled.

## 2025-04-02 LAB
AFP L3 MFR SERPL: NORMAL % (ref 0–9.9)
AFP SERPL-MCNC: 1.4 NG/ML (ref 0–8.4)

## 2025-06-27 ENCOUNTER — CLINICAL DOCUMENTATION (OUTPATIENT)
Age: 65
End: 2025-06-27

## 2025-06-27 DIAGNOSIS — E83.01: ICD-10-CM

## 2025-06-27 DIAGNOSIS — K74.60 CIRRHOSIS OF LIVER WITHOUT ASCITES, UNSPECIFIED HEPATIC CIRRHOSIS TYPE (HCC): Primary | ICD-10-CM

## 2025-07-23 PROBLEM — K74.60 CIRRHOSIS (HCC): Status: ACTIVE | Noted: 2025-07-23

## 2025-08-05 LAB
BASOPHILS # BLD AUTO: 0.1 X10E3/UL (ref 0–0.2)
BASOPHILS NFR BLD AUTO: 1 %
EOSINOPHIL # BLD AUTO: 0.1 X10E3/UL (ref 0–0.4)
EOSINOPHIL NFR BLD AUTO: 1 %
ERYTHROCYTE [DISTWIDTH] IN BLOOD BY AUTOMATED COUNT: 14.5 % (ref 11.6–15.4)
HCT VFR BLD AUTO: 45.8 % (ref 37.5–51)
HGB BLD-MCNC: 15.1 G/DL (ref 13–17.7)
IMM GRANULOCYTES # BLD AUTO: 0 X10E3/UL (ref 0–0.1)
IMM GRANULOCYTES NFR BLD AUTO: 0 %
LYMPHOCYTES # BLD AUTO: 1.7 X10E3/UL (ref 0.7–3.1)
LYMPHOCYTES NFR BLD AUTO: 26 %
MCH RBC QN AUTO: 31.3 PG (ref 26.6–33)
MCHC RBC AUTO-ENTMCNC: 33 G/DL (ref 31.5–35.7)
MCV RBC AUTO: 95 FL (ref 79–97)
MONOCYTES # BLD AUTO: 0.6 X10E3/UL (ref 0.1–0.9)
MONOCYTES NFR BLD AUTO: 9 %
NEUTROPHILS # BLD AUTO: 4.3 X10E3/UL (ref 1.4–7)
NEUTROPHILS NFR BLD AUTO: 63 %
PLATELET # BLD AUTO: 253 X10E3/UL (ref 150–450)
RBC # BLD AUTO: 4.83 X10E6/UL (ref 4.14–5.8)
WBC # BLD AUTO: 6.8 X10E3/UL (ref 3.4–10.8)

## 2025-08-06 LAB
ALBUMIN SERPL-MCNC: 4.4 G/DL (ref 3.9–4.9)
ALP SERPL-CCNC: 143 IU/L (ref 44–121)
ALT SERPL-CCNC: 69 IU/L (ref 0–44)
AST SERPL-CCNC: 67 IU/L (ref 0–40)
BILIRUB DIRECT SERPL-MCNC: 0.16 MG/DL (ref 0–0.4)
BILIRUB SERPL-MCNC: 0.4 MG/DL (ref 0–1.2)
BUN SERPL-MCNC: 12 MG/DL (ref 8–27)
BUN/CREAT SERPL: 14 (ref 10–24)
CALCIUM SERPL-MCNC: 9.9 MG/DL (ref 8.6–10.2)
CHLORIDE SERPL-SCNC: 103 MMOL/L (ref 96–106)
CO2 SERPL-SCNC: 22 MMOL/L (ref 20–29)
CREAT SERPL-MCNC: 0.88 MG/DL (ref 0.76–1.27)
EGFRCR SERPLBLD CKD-EPI 2021: 97 ML/MIN/1.73
GLUCOSE SERPL-MCNC: 90 MG/DL (ref 70–99)
INR PPP: 1.1 (ref 0.9–1.2)
POTASSIUM SERPL-SCNC: 4.8 MMOL/L (ref 3.5–5.2)
PROT SERPL-MCNC: 7.7 G/DL (ref 6–8.5)
PROTHROMBIN TIME: 12.2 SEC (ref 9.1–12)
SODIUM SERPL-SCNC: 141 MMOL/L (ref 134–144)

## 2025-08-08 LAB
AFP L3 MFR SERPL: NORMAL % (ref 0–9.9)
AFP SERPL-MCNC: 1.5 NG/ML (ref 0–8.4)

## 2025-08-25 ENCOUNTER — TELEPHONE (OUTPATIENT)
Age: 65
End: 2025-08-25

## (undated) DEVICE — 3000CC GUARDIAN II: Brand: GUARDIAN

## (undated) DEVICE — CLICKLINE SCISSORS INSERT: Brand: CLICKLINE

## (undated) DEVICE — UNIVERSAL FIXATION CANNULA: Brand: VERSAONE

## (undated) DEVICE — STRAP,POSITIONING,KNEE/BODY,FOAM,4X60": Brand: MEDLINE

## (undated) DEVICE — SUTURE MCRYL SZ 4-0 L27IN ABSRB UD L19MM PS-2 1/2 CIR PRIM Y426H

## (undated) DEVICE — KIT IV STRT W CHLORAPREP PD 1ML

## (undated) DEVICE — SOLUTION IRRIGATION NACL 0.9% 1000 ML FLX CONTAINER

## (undated) DEVICE — APPLIER LIG CLP L13IN 10MM PSTL GRP CONTAIN 15 TI L CLP

## (undated) DEVICE — BAG BELONG PT PERS CLEAR HANDL

## (undated) DEVICE — DISSECTOR RMFG CURVED 5MM --

## (undated) DEVICE — DRAPE,UTILTY,TAPE,15X26, 4EA/PK: Brand: MEDLINE

## (undated) DEVICE — INSUFFLATION NEEDLE: Brand: SURGINEEDLE

## (undated) DEVICE — PAD,NON-ADHERENT,3X8,STERILE,LF,1/PK: Brand: MEDLINE

## (undated) DEVICE — MAX-CORE® DISPOSABLE CORE BIOPSY INSTRUMENT, 16G X 16CM: Brand: MAX-CORE

## (undated) DEVICE — SPECIMEN RETRIEVAL POUCH: Brand: ENDO CATCH GOLD

## (undated) DEVICE — KENDALL SCD EXPRESS SLEEVES, KNEE LENGTH, MEDIUM: Brand: KENDALL SCD

## (undated) DEVICE — CONTAINER SPEC 20 ML LID NEUT BUFF FORMALIN 10 % POLYPR STS

## (undated) DEVICE — NEONATAL-ADULT SPO2 SENSOR: Brand: NELLCOR

## (undated) DEVICE — SUTURE SZ 0 27IN 5/8 CIR UR-6  TAPER PT VIOLET ABSRB VICRYL J603H

## (undated) DEVICE — TRAY BX SFT TISS W/ RUL ALC PREP PD FEN DRP TWL LUERLOCK

## (undated) DEVICE — REM POLYHESIVE ADULT PATIENT RETURN ELECTRODE: Brand: VALLEYLAB

## (undated) DEVICE — STERILE POLYISOPRENE POWDER-FREE SURGICAL GLOVES WITH EMOLLIENT COATING: Brand: PROTEXIS

## (undated) DEVICE — CATH IV AUTOGRD BC BLU 22GA 25 -- INSYTE

## (undated) DEVICE — TROCAR SITE CLOSURE DEVICE: Brand: ENDO CLOSE

## (undated) DEVICE — APPLICATOR BNDG 1MM ADH PREMIERPRO EXOFIN

## (undated) DEVICE — TUBING INSUFLTN 10FT LUER -- CONVERT TO ITEM 368568

## (undated) DEVICE — (D)PREP SKN CHLRAPRP APPL 26ML -- CONVERT TO ITEM 371833

## (undated) DEVICE — Device

## (undated) DEVICE — SET ADMIN 16ML TBNG L100IN 2 Y INJ SITE IV PIGGY BK DISP

## (undated) DEVICE — KENDALL RADIOLUCENT FOAM MONITORING ELECTRODE -RECTANGULAR SHAPE: Brand: KENDALL

## (undated) DEVICE — BLADELESS OPTICAL TROCAR WITH FIXATION CANNULA: Brand: VERSAONE

## (undated) DEVICE — SET EXTN TBNG L BOR 4 W STPCOCK ST 32IN PRIMING VOL 6ML

## (undated) DEVICE — NEEDLE HYPO 25GA L1.5IN BVL ORIENTED ECLIPSE

## (undated) DEVICE — INFECTION CONTROL KIT SYS

## (undated) DEVICE — SURGICAL PROCEDURE KIT GEN LAPAROSCOPY LF

## (undated) DEVICE — MAX-CORE® DISPOSABLE CORE BIOPSY INSTRUMENT, 18G X 20CM: Brand: MAX-CORE

## (undated) DEVICE — FILTER SMK EVAC FLO CLR MEGADYNE

## (undated) DEVICE — SYRINGE MED 20ML STD CLR PLAS LUERLOCK TIP N CTRL DISP

## (undated) DEVICE — BLADELESS OPTICAL TROCAR WITH FIXATION CANNULA: Brand: VERSAPORT